# Patient Record
Sex: FEMALE | Race: WHITE | NOT HISPANIC OR LATINO | Employment: FULL TIME | URBAN - METROPOLITAN AREA
[De-identification: names, ages, dates, MRNs, and addresses within clinical notes are randomized per-mention and may not be internally consistent; named-entity substitution may affect disease eponyms.]

---

## 2017-01-04 ENCOUNTER — OFFICE VISIT (OUTPATIENT)
Dept: MEDICAL GROUP | Facility: PHYSICIAN GROUP | Age: 52
End: 2017-01-04
Payer: COMMERCIAL

## 2017-01-04 VITALS
SYSTOLIC BLOOD PRESSURE: 128 MMHG | HEART RATE: 76 BPM | BODY MASS INDEX: 38.27 KG/M2 | RESPIRATION RATE: 18 BRPM | WEIGHT: 216 LBS | TEMPERATURE: 98.1 F | DIASTOLIC BLOOD PRESSURE: 78 MMHG | OXYGEN SATURATION: 96 %

## 2017-01-04 DIAGNOSIS — E66.9 OBESITY (BMI 30-39.9): ICD-10-CM

## 2017-01-04 DIAGNOSIS — Z00.00 PREVENTATIVE HEALTH CARE: ICD-10-CM

## 2017-01-04 DIAGNOSIS — E89.40 PREMATURE SURGICAL MENOPAUSE: ICD-10-CM

## 2017-01-04 DIAGNOSIS — I10 ESSENTIAL HYPERTENSION: ICD-10-CM

## 2017-01-04 DIAGNOSIS — Z12.39 SCREENING FOR BREAST CANCER: ICD-10-CM

## 2017-01-04 PROCEDURE — 99214 OFFICE O/P EST MOD 30 MIN: CPT | Performed by: NURSE PRACTITIONER

## 2017-01-04 RX ORDER — AMLODIPINE BESYLATE 10 MG/1
10 TABLET ORAL DAILY
Qty: 30 TAB | Refills: 1 | Status: SHIPPED | OUTPATIENT
Start: 2017-01-04 | End: 2017-03-29 | Stop reason: SDUPTHER

## 2017-01-04 ASSESSMENT — PATIENT HEALTH QUESTIONNAIRE - PHQ9: CLINICAL INTERPRETATION OF PHQ2 SCORE: 0

## 2017-01-04 NOTE — MR AVS SNAPSHOT
Sharmin Sanchezby   2017 10:15 AM   Office Visit   MRN: [id number]    Department:  [facility]   Dept Phone:  [phone]    Description:  Female : 1965   Provider:  KE Negro           Reason for Visit     Hypertension amlodipine      Allergies as of 2017     Allergen Noted Reactions    Bactrim 2008   Rash    Iodine 10/17/2016   Nausea      You were diagnosed with     Essential hypertension   [6982042]       Premature surgical menopause   [431388]       Obesity (BMI 30-39.9)   [632334]       Screening for breast cancer   [722441]       Preventative health care   [849634]         Vital Signs     Blood Pressure Pulse Temperature Respirations Weight Oxygen Saturation    128/78 mmHg 76 36.7 °C (98.1 °F) 18 97.977 kg (216 lb) 96%    Last Menstrual Period Smoking Status                2008 Current Every Day Smoker          Basic Information     Date Of Birth Sex Race Ethnicity Preferred Language    1965 Female White Non- English      Your appointments     2017  8:30 AM   Adult Draw/Collection with LAB [facility] (--)    [address] [place] [address]. [address]  [place] [address]   [phone]            Mar 01, 2017  1:35 PM   New Patient with KE Negro   [facility]    [address] [place] [address], [address]  [place] [address]   [phone]           Please bring Photo ID, Insurance Cards, All Medication Bottles and copies of any legal documents (such as Living Will, Power of ) If speaking a language besides English please bring an adult . Please arrive 30 minutes prior for check in and registration. You will be receiving a confirmation call a few days before your appointment from our automated call confirmation system.              Problem List              ICD-10-CM Priority Class Noted - Resolved    COPD with exacerbation (HCC) J44.1   2016 - Present    Asthma  exacerbation J45.901 High  12/13/2016 - Present    Nicotine dependence F17.200 Medium  12/13/2016 - Present    Leukocytosis D72.829 Low  12/13/2016 - Present    Essential hypertension I10   1/4/2017 - Present    Premature surgical menopause E89.40   1/4/2017 - Present    Obesity (BMI 30-39.9) E66.9   1/4/2017 - Present      Health Maintenance        Date Due Completion Dates    IMM DTaP/Tdap/Td Vaccine (1 - Tdap) 8/5/1986 ---    IMM PNEUMOCOCCAL 19-64 (ADULT) MEDIUM RISK SERIES (1 of 1 - PPSV23) 8/5/1986 ---    PAP SMEAR 8/5/1988 ---    MAMMOGRAM 8/5/2007 ---    IMM INFLUENZA (1) 9/1/2016 ---            Current Immunizations     No immunizations on file.      Below and/or attached are the medications your provider expects you to take. Review all of your home medications and newly ordered medications with your provider and/or pharmacist. Follow medication instructions as directed by your provider and/or pharmacist. Please keep your medication list with you and share with your provider. Update the information when medications are discontinued, doses are changed, or new medications (including over-the-counter products) are added; and carry medication information at all times in the event of emergency situations     Allergies:  BACTRIM - Rash     IODINE - Nausea               Medications  Valid as of: January 04, 2017 - 11:15 AM    Generic Name Brand Name Tablet Size Instructions for use    Albuterol Sulfate (Aero Soln) albuterol 108 (90 BASE) MCG/ACT Inhale 2 Puffs by mouth every 6 hours as needed for Shortness of Breath.        AmLODIPine Besylate (Tab) NORVASC 10 MG Take 1 Tab by mouth every day.        Budesonide-Formoterol Fumarate (Aerosol) SYMBICORT 160-4.5 MCG/ACT Inhale 2 Puffs by mouth 2 Times a Day.        Ipratropium-Albuterol (Solution) DUONEB 0.5-2.5 (3) MG/3ML 3 mL by Nebulization route every four hours as needed for Shortness of Breath (bronchospasm).        Multiple Vitamin   Take 1 Tab by mouth every  day.        Nicotine (PATCH 24 HR) NICODERM 7 MG/24HR Apply 1 Patch to skin as directed every 24 hours.        .                 Medicines prescribed today were sent to:     [facility] [address] [place] [address] [place] - [address] [place] [address]    Phone: [phone] Fax: [phone]    Open 24 Hours?: No      Medication refill instructions:       If your prescription bottle indicates you have medication refills left, it is not necessary to call your provider’s office. Please contact your pharmacy and they will refill your medication.    If your prescription bottle indicates you do not have any refills left, you may request refills at any time through one of the following ways: The online [employer] system (except Urgent Care), by calling your provider’s office, or by asking your pharmacy to contact your provider’s office with a refill request. Medication refills are processed only during regular business hours and may not be available until the next business day. Your provider may request additional information or to have a follow-up visit with you prior to refilling your medication.   *Please Note: Medication refills are assigned a new Rx number when refilled electronically. Your pharmacy may indicate that no refills were authorized even though a new prescription for the same medication is available at the pharmacy. Please request the medicine by name with the pharmacy before contacting your provider for a refill.        Your To Do List     Future Labs/Procedures Complete By Expires    COMP METABOLIC PANEL  As directed 1/5/2018    Comments:    8 hour fast, plain water only    FREE THYROXINE  As directed 1/5/2018    LIPID PROFILE  As directed 1/5/2018    Comments:    10 hour fast, plain water only    TSH  As directed 1/5/2018    VITAMIN D,25 HYDROXY  As directed 1/5/2018         Xeebel Status: Patient Declined

## 2017-01-04 NOTE — ASSESSMENT & PLAN NOTE
Diagnosed when she was around 22. Recent hospital visit she was discharged on amlodipine 10 mg which she's been taking daily. Home readings done but she does not recall readings.   No adverse effects of medication.

## 2017-01-07 NOTE — PROGRESS NOTES
Subjective:     Chief Complaint   Patient presents with   • Hypertension     amlodipine        Sharmin Cardozo is a 51 y.o. Female New patient here today for evaluation and management of:    Essential hypertension  Diagnosed when she was around 22. Recent hospital visit she was discharged on amlodipine 10 mg which she's been taking daily. Home readings done but she does not recall readings.   No adverse effects of medication.    Premature surgical menopause  2009. No history of HRT use       ROS   Denies HA, chest pain, shortness of breath, abdominal pain, bladder or bowel changes, lower extremity edema.    Current medicines (including changes today)  Current Outpatient Prescriptions   Medication Sig Dispense Refill   • amlodipine (NORVASC) 10 MG Tab Take 1 Tab by mouth every day. 30 Tab 1   • ipratropium-albuterol (DUONEB) 0.5-2.5 (3) MG/3ML nebulizer solution 3 mL by Nebulization route every four hours as needed for Shortness of Breath (bronchospasm). 75 mL 0   • albuterol 108 (90 BASE) MCG/ACT Aero Soln inhalation aerosol Inhale 2 Puffs by mouth every 6 hours as needed for Shortness of Breath. 8.5 g 1   • Multiple Vitamin (DAILY VITAMIN PO) Take 1 Tab by mouth every day.     • budesonide-formoterol (SYMBICORT) 160-4.5 MCG/ACT Aerosol Inhale 2 Puffs by mouth 2 Times a Day. 2 Inhaler 0   • nicotine (NICODERM) 7 MG/24HR PATCH 24 HR Apply 1 Patch to skin as directed every 24 hours. 21 Patch 0     No current facility-administered medications for this visit.       She  has a past medical history of Physiological ovarian cysts; ASTHMA; and Hypertension.    Allergies Bactrim and Iodine    Current medications, problem list, allergies, past medical history, and tobacco use history reviewed in EPIC today.    Health maintenance reviewed and updated.    Objective:   Blood pressure 128/78, pulse 76, temperature 36.7 °C (98.1 °F), resp. rate 18, weight 97.977 kg (216 lb), last menstrual period 04/17/2008, SpO2 96 %. Body  mass index is 38.27 kg/(m^2).     Physical Exam   Constitutional: Alert, no acute distress. Pleasant and cooperative with the examination.  Skin:   Warm, dry, no rashes in visible areas.    Eyes:   Pupils equal, round. Conjunctiva and sclera clear,    Lids normal.  ENT:  Pinna normal.   Neck:   Supple, trachea midline.  Lungs:  Normal effort and respirations. Clear to auscultation bilaterally.  CV:  Regular rate and rhythm.  MS/Ext:  Steady gait, no edema.  Psych:  Eye contact is good, affect calm.    Assessment and Plan:   The following treatment plan was discussed    1. Essential hypertension  Stable. Continue current medicines. Monitor labs regularly.      - TSH; Future  - FREE THYROXINE; Future    2. Premature surgical menopause  Asymptomatic.    3. Obesity (BMI 30-39.9)    - Patient identified as having weight management issue.  Appropriate orders and counseling given.    4. Screening for breast cancer    - MA-SCREEN MAMMO W/CAD-BILAT    5. Preventative health care  I'm's order today and will be reviewed upcoming appointment.  - COMP METABOLIC PANEL; Future  - LIPID PROFILE; Future  - VITAMIN D,25 HYDROXY; Future  - TSH; Future  - FREE THYROXINE; Future    Followup: Return in about 8 weeks (around 3/1/2017), or as previously scheduled.  As scheduled, sooner if symptoms don't resolve or with any new problems.           Reviewed side effects, risks, and benefits of medications prescribed today.  Advised to take all medications as instructed and report any side effects.   The patient voices understanding and agrees.  Report any new or worsening symptoms.  Have labs or other diagnostic studies prior to follow up.  Keep all appointments for any referrals given.      Please note this dictation was created using voice recognition software. Every reasonable attempt has been made to correct obvious errors, however there may be errors of grammar and possibly content that were not discovered before finalizing the note.

## 2017-02-02 ENCOUNTER — RESOLUTE PROFESSIONAL BILLING HOSPITAL PROF FEE (OUTPATIENT)
Dept: HOSPITALIST | Facility: MEDICAL CENTER | Age: 52
End: 2017-02-02
Payer: COMMERCIAL

## 2017-02-02 ENCOUNTER — APPOINTMENT (OUTPATIENT)
Dept: RADIOLOGY | Facility: MEDICAL CENTER | Age: 52
End: 2017-02-02
Attending: EMERGENCY MEDICINE
Payer: COMMERCIAL

## 2017-02-02 ENCOUNTER — HOSPITAL ENCOUNTER (OUTPATIENT)
Facility: MEDICAL CENTER | Age: 52
End: 2017-02-03
Attending: EMERGENCY MEDICINE | Admitting: HOSPITALIST
Payer: COMMERCIAL

## 2017-02-02 DIAGNOSIS — R07.9 CHEST PAIN, UNSPECIFIED TYPE: ICD-10-CM

## 2017-02-02 LAB
ALBUMIN SERPL BCP-MCNC: 4.6 G/DL (ref 3.2–4.9)
ALBUMIN/GLOB SERPL: 1.8 G/DL
ALP SERPL-CCNC: 68 U/L (ref 30–99)
ALT SERPL-CCNC: 18 U/L (ref 2–50)
ANION GAP SERPL CALC-SCNC: 7 MMOL/L (ref 0–11.9)
APTT PPP: 28.3 SEC (ref 24.7–36)
AST SERPL-CCNC: 15 U/L (ref 12–45)
BASOPHILS # BLD AUTO: 0.2 % (ref 0–1.8)
BASOPHILS # BLD: 0.02 K/UL (ref 0–0.12)
BILIRUB SERPL-MCNC: 0.3 MG/DL (ref 0.1–1.5)
BNP SERPL-MCNC: 23 PG/ML (ref 0–100)
BUN SERPL-MCNC: 26 MG/DL (ref 8–22)
CALCIUM SERPL-MCNC: 9.9 MG/DL (ref 8.5–10.5)
CHLORIDE SERPL-SCNC: 105 MMOL/L (ref 96–112)
CO2 SERPL-SCNC: 26 MMOL/L (ref 20–33)
CREAT SERPL-MCNC: 0.59 MG/DL (ref 0.5–1.4)
EKG IMPRESSION: NORMAL
EOSINOPHIL # BLD AUTO: 0.05 K/UL (ref 0–0.51)
EOSINOPHIL NFR BLD: 0.5 % (ref 0–6.9)
ERYTHROCYTE [DISTWIDTH] IN BLOOD BY AUTOMATED COUNT: 42.6 FL (ref 35.9–50)
EST. AVERAGE GLUCOSE BLD GHB EST-MCNC: 117 MG/DL
GFR SERPL CREATININE-BSD FRML MDRD: >60 ML/MIN/1.73 M 2
GLOBULIN SER CALC-MCNC: 2.5 G/DL (ref 1.9–3.5)
GLUCOSE SERPL-MCNC: 110 MG/DL (ref 65–99)
HBA1C MFR BLD: 5.7 % (ref 0–5.6)
HCT VFR BLD AUTO: 43.7 % (ref 37–47)
HGB BLD-MCNC: 14.8 G/DL (ref 12–16)
IMM GRANULOCYTES # BLD AUTO: 0.03 K/UL (ref 0–0.11)
IMM GRANULOCYTES NFR BLD AUTO: 0.3 % (ref 0–0.9)
INR PPP: 0.86 (ref 0.87–1.13)
LIPASE SERPL-CCNC: 9 U/L (ref 11–82)
LYMPHOCYTES # BLD AUTO: 3.34 K/UL (ref 1–4.8)
LYMPHOCYTES NFR BLD: 33.6 % (ref 22–41)
MAGNESIUM SERPL-MCNC: 2.3 MG/DL (ref 1.5–2.5)
MCH RBC QN AUTO: 31.6 PG (ref 27–33)
MCHC RBC AUTO-ENTMCNC: 33.9 G/DL (ref 33.6–35)
MCV RBC AUTO: 93.4 FL (ref 81.4–97.8)
MONOCYTES # BLD AUTO: 0.46 K/UL (ref 0–0.85)
MONOCYTES NFR BLD AUTO: 4.6 % (ref 0–13.4)
NEUTROPHILS # BLD AUTO: 6.05 K/UL (ref 2–7.15)
NEUTROPHILS NFR BLD: 60.8 % (ref 44–72)
NRBC # BLD AUTO: 0 K/UL
NRBC BLD AUTO-RTO: 0 /100 WBC
PLATELET # BLD AUTO: 216 K/UL (ref 164–446)
PMV BLD AUTO: 10 FL (ref 9–12.9)
POTASSIUM SERPL-SCNC: 3.3 MMOL/L (ref 3.6–5.5)
PROT SERPL-MCNC: 7.1 G/DL (ref 6–8.2)
PROTHROMBIN TIME: 12 SEC (ref 12–14.6)
RBC # BLD AUTO: 4.68 M/UL (ref 4.2–5.4)
SODIUM SERPL-SCNC: 138 MMOL/L (ref 135–145)
TROPONIN I SERPL-MCNC: <0.01 NG/ML (ref 0–0.04)
TSH SERPL DL<=0.005 MIU/L-ACNC: 0.57 UIU/ML (ref 0.3–3.7)
WBC # BLD AUTO: 10 K/UL (ref 4.8–10.8)

## 2017-02-02 PROCEDURE — 84484 ASSAY OF TROPONIN QUANT: CPT

## 2017-02-02 PROCEDURE — 84443 ASSAY THYROID STIM HORMONE: CPT

## 2017-02-02 PROCEDURE — 93010 ELECTROCARDIOGRAM REPORT: CPT | Performed by: INTERNAL MEDICINE

## 2017-02-02 PROCEDURE — 99220 PR INITIAL OBSERVATION CARE,LEVL III: CPT | Performed by: HOSPITALIST

## 2017-02-02 PROCEDURE — 99285 EMERGENCY DEPT VISIT HI MDM: CPT

## 2017-02-02 PROCEDURE — 93005 ELECTROCARDIOGRAM TRACING: CPT

## 2017-02-02 PROCEDURE — 93005 ELECTROCARDIOGRAM TRACING: CPT | Performed by: HOSPITALIST

## 2017-02-02 PROCEDURE — G0378 HOSPITAL OBSERVATION PER HR: HCPCS

## 2017-02-02 PROCEDURE — 80053 COMPREHEN METABOLIC PANEL: CPT

## 2017-02-02 PROCEDURE — 83880 ASSAY OF NATRIURETIC PEPTIDE: CPT

## 2017-02-02 PROCEDURE — 83036 HEMOGLOBIN GLYCOSYLATED A1C: CPT

## 2017-02-02 PROCEDURE — 36415 COLL VENOUS BLD VENIPUNCTURE: CPT

## 2017-02-02 PROCEDURE — 96375 TX/PRO/DX INJ NEW DRUG ADDON: CPT

## 2017-02-02 PROCEDURE — 700102 HCHG RX REV CODE 250 W/ 637 OVERRIDE(OP): Performed by: HOSPITALIST

## 2017-02-02 PROCEDURE — 85610 PROTHROMBIN TIME: CPT

## 2017-02-02 PROCEDURE — 71010 DX-CHEST-LIMITED (1 VIEW): CPT

## 2017-02-02 PROCEDURE — 96374 THER/PROPH/DIAG INJ IV PUSH: CPT

## 2017-02-02 PROCEDURE — 85025 COMPLETE CBC W/AUTO DIFF WBC: CPT

## 2017-02-02 PROCEDURE — 93005 ELECTROCARDIOGRAM TRACING: CPT | Performed by: EMERGENCY MEDICINE

## 2017-02-02 PROCEDURE — A9270 NON-COVERED ITEM OR SERVICE: HCPCS | Performed by: HOSPITALIST

## 2017-02-02 PROCEDURE — 85730 THROMBOPLASTIN TIME PARTIAL: CPT

## 2017-02-02 PROCEDURE — 700111 HCHG RX REV CODE 636 W/ 250 OVERRIDE (IP): Performed by: HOSPITALIST

## 2017-02-02 PROCEDURE — 83690 ASSAY OF LIPASE: CPT

## 2017-02-02 PROCEDURE — 700105 HCHG RX REV CODE 258: Performed by: HOSPITALIST

## 2017-02-02 PROCEDURE — 83735 ASSAY OF MAGNESIUM: CPT

## 2017-02-02 RX ORDER — DOCUSATE SODIUM 100 MG/1
100 CAPSULE, LIQUID FILLED ORAL EVERY MORNING
Status: DISCONTINUED | OUTPATIENT
Start: 2017-02-02 | End: 2017-02-03 | Stop reason: HOSPADM

## 2017-02-02 RX ORDER — AMOXICILLIN 250 MG
1 CAPSULE ORAL NIGHTLY
Status: DISCONTINUED | OUTPATIENT
Start: 2017-02-02 | End: 2017-02-03 | Stop reason: HOSPADM

## 2017-02-02 RX ORDER — PROMETHAZINE HYDROCHLORIDE 25 MG/1
12.5-25 SUPPOSITORY RECTAL EVERY 4 HOURS PRN
Status: DISCONTINUED | OUTPATIENT
Start: 2017-02-02 | End: 2017-02-03 | Stop reason: HOSPADM

## 2017-02-02 RX ORDER — ATORVASTATIN CALCIUM 20 MG/1
20 TABLET, FILM COATED ORAL EVERY EVENING
Status: DISCONTINUED | OUTPATIENT
Start: 2017-02-02 | End: 2017-02-03 | Stop reason: HOSPADM

## 2017-02-02 RX ORDER — AMOXICILLIN 250 MG
1 CAPSULE ORAL
Status: DISCONTINUED | OUTPATIENT
Start: 2017-02-02 | End: 2017-02-03 | Stop reason: HOSPADM

## 2017-02-02 RX ORDER — NICOTINE 21 MG/24HR
14 PATCH, TRANSDERMAL 24 HOURS TRANSDERMAL
Status: DISCONTINUED | OUTPATIENT
Start: 2017-02-03 | End: 2017-02-03 | Stop reason: HOSPADM

## 2017-02-02 RX ORDER — NITROGLYCERIN 0.4 MG/1
0.4 TABLET SUBLINGUAL
Status: DISCONTINUED | OUTPATIENT
Start: 2017-02-02 | End: 2017-02-03 | Stop reason: HOSPADM

## 2017-02-02 RX ORDER — ONDANSETRON 2 MG/ML
4 INJECTION INTRAMUSCULAR; INTRAVENOUS EVERY 4 HOURS PRN
Status: DISCONTINUED | OUTPATIENT
Start: 2017-02-02 | End: 2017-02-03 | Stop reason: HOSPADM

## 2017-02-02 RX ORDER — LACTULOSE 20 G/30ML
30 SOLUTION ORAL
Status: DISCONTINUED | OUTPATIENT
Start: 2017-02-02 | End: 2017-02-03 | Stop reason: HOSPADM

## 2017-02-02 RX ORDER — ASPIRIN 81 MG/1
324 TABLET, CHEWABLE ORAL DAILY
Status: DISCONTINUED | OUTPATIENT
Start: 2017-02-02 | End: 2017-02-03 | Stop reason: HOSPADM

## 2017-02-02 RX ORDER — ASPIRIN 300 MG/1
300 SUPPOSITORY RECTAL DAILY
Status: DISCONTINUED | OUTPATIENT
Start: 2017-02-02 | End: 2017-02-03 | Stop reason: HOSPADM

## 2017-02-02 RX ORDER — ENEMA 19; 7 G/133ML; G/133ML
1 ENEMA RECTAL
Status: DISCONTINUED | OUTPATIENT
Start: 2017-02-02 | End: 2017-02-03 | Stop reason: HOSPADM

## 2017-02-02 RX ORDER — MORPHINE SULFATE 4 MG/ML
2-4 INJECTION, SOLUTION INTRAMUSCULAR; INTRAVENOUS
Status: DISCONTINUED | OUTPATIENT
Start: 2017-02-02 | End: 2017-02-03 | Stop reason: HOSPADM

## 2017-02-02 RX ORDER — ALPRAZOLAM 0.25 MG/1
0.25 TABLET ORAL 4 TIMES DAILY PRN
Status: DISCONTINUED | OUTPATIENT
Start: 2017-02-02 | End: 2017-02-03 | Stop reason: HOSPADM

## 2017-02-02 RX ORDER — ASPIRIN 325 MG
325 TABLET ORAL DAILY
Status: DISCONTINUED | OUTPATIENT
Start: 2017-02-02 | End: 2017-02-03 | Stop reason: HOSPADM

## 2017-02-02 RX ORDER — LABETALOL HYDROCHLORIDE 5 MG/ML
10 INJECTION, SOLUTION INTRAVENOUS EVERY 4 HOURS PRN
Status: DISCONTINUED | OUTPATIENT
Start: 2017-02-02 | End: 2017-02-03 | Stop reason: HOSPADM

## 2017-02-02 RX ORDER — BISACODYL 10 MG
10 SUPPOSITORY, RECTAL RECTAL
Status: DISCONTINUED | OUTPATIENT
Start: 2017-02-02 | End: 2017-02-03 | Stop reason: HOSPADM

## 2017-02-02 RX ORDER — ONDANSETRON 4 MG/1
4 TABLET, ORALLY DISINTEGRATING ORAL EVERY 4 HOURS PRN
Status: DISCONTINUED | OUTPATIENT
Start: 2017-02-02 | End: 2017-02-03 | Stop reason: HOSPADM

## 2017-02-02 RX ORDER — AMLODIPINE BESYLATE 10 MG/1
10 TABLET ORAL DAILY
Status: DISCONTINUED | OUTPATIENT
Start: 2017-02-03 | End: 2017-02-03 | Stop reason: HOSPADM

## 2017-02-02 RX ORDER — HEPARIN SODIUM 5000 [USP'U]/ML
5000 INJECTION, SOLUTION INTRAVENOUS; SUBCUTANEOUS EVERY 8 HOURS
Status: DISCONTINUED | OUTPATIENT
Start: 2017-02-02 | End: 2017-02-03 | Stop reason: HOSPADM

## 2017-02-02 RX ORDER — ACETAMINOPHEN 325 MG/1
650 TABLET ORAL EVERY 6 HOURS PRN
Status: DISCONTINUED | OUTPATIENT
Start: 2017-02-02 | End: 2017-02-03 | Stop reason: HOSPADM

## 2017-02-02 RX ORDER — SODIUM CHLORIDE 9 MG/ML
INJECTION, SOLUTION INTRAVENOUS CONTINUOUS
Status: DISPENSED | OUTPATIENT
Start: 2017-02-02 | End: 2017-02-03

## 2017-02-02 RX ORDER — BUDESONIDE AND FORMOTEROL FUMARATE DIHYDRATE 160; 4.5 UG/1; UG/1
2 AEROSOL RESPIRATORY (INHALATION) 2 TIMES DAILY
Status: DISCONTINUED | OUTPATIENT
Start: 2017-02-02 | End: 2017-02-03 | Stop reason: HOSPADM

## 2017-02-02 RX ORDER — PROMETHAZINE HYDROCHLORIDE 25 MG/1
12.5-25 TABLET ORAL EVERY 4 HOURS PRN
Status: DISCONTINUED | OUTPATIENT
Start: 2017-02-02 | End: 2017-02-03 | Stop reason: HOSPADM

## 2017-02-02 RX ADMIN — ASPIRIN 325 MG: 325 TABLET, COATED ORAL at 20:05

## 2017-02-02 RX ADMIN — SODIUM CHLORIDE: 9 INJECTION, SOLUTION INTRAVENOUS at 23:21

## 2017-02-02 RX ADMIN — MORPHINE SULFATE 2 MG: 4 INJECTION INTRAVENOUS at 22:47

## 2017-02-02 RX ADMIN — ATORVASTATIN CALCIUM 20 MG: 20 TABLET, FILM COATED ORAL at 22:47

## 2017-02-02 RX ADMIN — HEPARIN SODIUM 5000 UNITS: 5000 INJECTION, SOLUTION INTRAVENOUS; SUBCUTANEOUS at 22:47

## 2017-02-02 RX ADMIN — ONDANSETRON 4 MG: 2 INJECTION, SOLUTION INTRAMUSCULAR; INTRAVENOUS at 22:47

## 2017-02-02 ASSESSMENT — ENCOUNTER SYMPTOMS
COUGH: 0
VOMITING: 0
DIAPHORESIS: 0
ABDOMINAL PAIN: 0
TINGLING: 1
BACK PAIN: 1
NAUSEA: 1
SHORTNESS OF BREATH: 1

## 2017-02-02 ASSESSMENT — LIFESTYLE VARIABLES
DO YOU DRINK ALCOHOL: NO
ALCOHOL_USE: NO
EVER_SMOKED: YES

## 2017-02-02 ASSESSMENT — PAIN SCALES - GENERAL
PAINLEVEL_OUTOF10: 5
PAINLEVEL_OUTOF10: 0

## 2017-02-02 NOTE — ED NOTES
Pt ambulatory to rm 22 from triage, assessment completed, placed on cardiac monitor, iv established, awaiting md assessment

## 2017-02-02 NOTE — IP AVS SNAPSHOT
2/3/2017          Sharmin Sanchezby  [address]  [place] [address]    Dear Sharmin:    [facility] wants to ensure your discharge home is safe and you or your loved ones have had all your questions answered regarding your care after you leave the hospital.    You may receive a telephone call within two days of your discharge.  This call is to make certain you understand your discharge instructions as well as ensure we provided you with the best care possible during your stay with us.     The call will only last approximately 3-5 minutes and will be done by a nurse.    Once again, we want to ensure your discharge home is safe and that you have a clear understanding of any next steps in your care.  If you have any questions or concerns, please do not hesitate to contact us, we are here for you.  Thank you for choosing [facility] for your healthcare needs.    Sincerely,    Gildardo Pillai    [facility]

## 2017-02-02 NOTE — ED PROVIDER NOTES
ED Provider Note    Scribed for Kurt Pinto M.D. by Bambi Ye. 2/2/2017, 3:43 PM.    Primary care provider: Pcp Pt States None  Means of arrival: Walk-In  History obtained from: Patient  History limited by: None    CHIEF COMPLAINT  Chief Complaint   Patient presents with   • Chest Pain       HPI  Sharmin Cardozo is a 51 y.o. female who presents to the Emergency Department sent by Urgent Care for chest pain with associated shortness of breath and nausea, onset 1:00PM at work after she ate lunch. She states that her left arm and face went numb and were tingling shortly after the chest pain started. She has chronic back pain. She denies any abdominal pain, diaphoresis, vomiting, leg swelling, or coughs. She has a history of asthma and hypertension. She denies any cardiac issues. She admits to smoking on a daily basis but denies any alcohol or drug use. Her father had an MI at the age of 65.     REVIEW OF SYSTEMS  Review of Systems   Constitutional: Negative for diaphoresis.   Respiratory: Positive for shortness of breath. Negative for cough.    Cardiovascular: Positive for chest pain. Negative for leg swelling.   Gastrointestinal: Positive for nausea. Negative for vomiting and abdominal pain.   Musculoskeletal: Positive for back pain (chronic).   Neurological: Positive for tingling.        + numbness   All other systems reviewed and are negative.      PAST MEDICAL HISTORY   has a past medical history of Physiological ovarian cysts; ASTHMA; and Hypertension.    SURGICAL HISTORY   has past surgical history that includes vag deliv only,prev c-sectn; tubal ligation; cystoscopy (3/24/2009); cholecystectomy; vaginal hysterectomy scope total (3/24/2009); and other.    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     Current Outpatient Prescriptions on File Prior to Encounter   Medication Sig Dispense Refill   • amlodipine (NORVASC) 10 MG Tab Take 1 Tab by mouth every day. 30 Tab 1   • ipratropium-albuterol (DUONEB) 0.5-2.5 (3) MG/3ML nebulizer solution 3 mL by Nebulization route every four hours as needed for Shortness of Breath (bronchospasm). 75 mL 0   • budesonide-formoterol (SYMBICORT) 160-4.5 MCG/ACT Aerosol Inhale 2 Puffs by mouth 2 Times a Day. 2 Inhaler 0   • albuterol 108 (90 BASE) MCG/ACT Aero Soln inhalation aerosol Inhale 2 Puffs by mouth every 6 hours as needed for Shortness of Breath. 8.5 g 1   • Multiple Vitamin (DAILY VITAMIN PO) Take 1 Tab by mouth every day.         ALLERGIES  Allergies   Allergen Reactions   • Bactrim Rash   • Iodine Nausea       PHYSICAL EXAM  VITAL SIGNS: /55 mmHg  Pulse 74  Temp(Src) 37.3 °C (99.1 °F)  Resp 16  Ht 1.575 m (5' 2\")  SpO2 98%  LMP 04/17/2008    Constitutional:   No acute distress  HENT:  Moist mucous membranes  Eyes:  No conjunctivitis or icterus  Neck:  trachea is midline, no palpable thyroid  Lymphatic:  No cervical lymphadenopathy  Cardiovascular:  Regular rate and rhythm, no murmurs  Thorax & Lungs:  Normal breath sounds, no rhonchi  Abdomen: bowel sounds present, Soft, Non-tender  Skin:.  no rash  Back:  Non-tender, no CVA tenderness  Extremities:   trace edema  Vascular:  symmetric radial pulse  Neurologic:  Normal gross motor    LABS  Labs Reviewed   COMP METABOLIC PANEL - Abnormal; Notable for the following:     Potassium 3.3 (*)     Glucose 110 (*)     Bun 26 (*)     All other components within normal limits    Narrative:     Indicate which anticoagulants the patient is " on:->UNKNOWN   PROTHROMBIN TIME - Abnormal; Notable for the following:     INR 0.86 (*)     All other components within normal limits    Narrative:     Indicate which anticoagulants the patient is on:->UNKNOWN   LIPASE - Abnormal; Notable for the following:     Lipase 9 (*)     All other components within normal limits    Narrative:     Indicate which anticoagulants the patient is on:->UNKNOWN   TROPONIN    Narrative:     Indicate which anticoagulants the patient is on:->UNKNOWN   BTYPE NATRIURETIC PEPTIDE    Narrative:     Indicate which anticoagulants the patient is on:->UNKNOWN   CBC WITH DIFFERENTIAL    Narrative:     Indicate which anticoagulants the patient is on:->UNKNOWN   APTT    Narrative:     Indicate which anticoagulants the patient is on:->UNKNOWN   ESTIMATED GFR    Narrative:     Indicate which anticoagulants the patient is on:->UNKNOWN   All labs reviewed by me.    EKG Interpretation  Interpreted by me  Normal Sinus Rhythm. Rate 79  Normal corrected QTc  Normal corrected QRS  left Axis  Right atrial abnormality and unchanged from EKG performed yesterday    RADIOLOGY  DX-CHEST-LIMITED (1 VIEW)   Final Result      No acute cardiopulmonary process is identified.      The radiologist's interpretation of all radiological studies have been reviewed by me.    COURSE & MEDICAL DECISION MAKING  Pertinent Labs & Imaging studies reviewed. (See chart for details)    3:43 PM - Patient seen and examined at bedside. Ordered DX chest, troponin, btype natriuretic peptide, CBC with differential, CMP, prothrombin time, APTT, lipase, and EKG to evaluate her symptoms. The differential diagnoses include but are not limited to: Rule out MI. The patient was informed that general lab work and EKG will be ordered.  It was discussed with the patient that she will need to be admitted to the hospital for further evaluation. She understood and verbalized agreement.     5:09 PM I spoke with Dr. Nieto, hospitalist and informed him  about the patient. He will admit.     Medical Decision Making:  The patient's pain presentation and risk factor she'll be admitted for further cardiac chest pain evaluation    DISPOSITION:  Admitted to medicine in guarded condition    FINAL IMPRESSION  1. Chest pain, unspecified type          Bambi DUPREE ([place]), am scribing for, and in the presence of, Kurt Pinto M.D..    Electronically signed by: Bambi Ye ([place]), 2/2/2017    IKurt M.D. personally performed the services described in this documentation, as scribed by Bambi Ye in my presence, and it is both accurate and complete.    The note accurately reflects work and decisions made by me.  Kurt Pinto  2/2/2017  9:58 PM

## 2017-02-02 NOTE — IP AVS SNAPSHOT
" <p align=\"LEFT\"><IMG SRC=\"//EMRWB/blob$/Images/[email or web address]\" alt=\"Image\" WIDTH=\"50%\" HEIGHT=\"200\" BORDER=\"\"></p>                   Name:Sharmin Cardozo  Medical Record Number:[id number]  CSN: [id number]    YOB: 1965   Age: 51 y.o.  Sex: female  HT:1.575 m (5' 2\") WT: 98.6 kg (217 lb 6 oz)          Admit Date: 2/2/2017     Discharge Date:   Today's Date: 2/3/2017  Attending Doctor:  Joel Wright M.D.                  Allergies:  Bactrim and Iodine          Your appointments     Feb 08, 2017  2:45 PM   HOSPITAL FOLLOW UP with Karl Ho M.D.   [facility] (--)    [address]  [place] [id number] [phone]            Feb 22, 2017  8:30 AM   Adult Draw/Collection with LAB [place]   LAB [facility] (--)    [address]  [place] [address]   [phone]            Mar 01, 2017  1:35 PM   New Patient with KE Negro   [facility])    [address], [address]  [place] [address] [phone]           Please bring Photo ID, Insurance Cards, All Medication Bottles and copies of any legal documents (such as Living Will, Power of ) If speaking a language besides English please bring an adult . Please arrive 30 minutes prior for check in and registration. You will be receiving a confirmation call a few days before your appointment from our automated call confirmation system.              Follow-up Information     1. Follow up with KE Negro In 1 week.    Specialty:  Family Medicine    Contact information    [facility]  [place] [address] [phone]           Medication List      Take these Medications        Instructions    albuterol 108 (90 BASE) MCG/ACT Aers inhalation aerosol    Inhale 2 Puffs by mouth every 6 hours as needed for Shortness of Breath.   Dose:  2 Puff       amlodipine 10 MG Tabs   Commonly known " as:  NORVASC    Take 1 Tab by mouth every day.   Dose:  10 mg       budesonide-formoterol 160-4.5 MCG/ACT Aero   Commonly known as:  SYMBICORT    Inhale 2 Puffs by mouth 2 Times a Day.   Dose:  2 Puff       cyclobenzaprine 5 MG tablet   Commonly known as:  FLEXERIL    Take 1-2 Tabs by mouth 3 times a day as needed for Moderate Pain or Muscle Spasms.   Dose:  5-10 mg       DAILY VITAMIN PO    Take 1 Tab by mouth every day.   Dose:  1 Tab       ibuprofen 400 MG Tabs   Commonly known as:  MOTRIN    Take 1 Tab by mouth every 6 hours as needed for Moderate Pain.   Dose:  400 mg       ipratropium-albuterol 0.5-2.5 (3) MG/3ML nebulizer solution   Commonly known as:  DUONEB    3 mL by Nebulization route every four hours as needed for Shortness of Breath (bronchospasm).   Dose:  3 mL

## 2017-02-02 NOTE — IP AVS SNAPSHOT
" After Visit Summary                                                                                                                  Name:Sharmin Cardozo  Medical Record Number:[id number]  CSN: [id number]    YOB: 1965   Age: 51 y.o.  Sex: female  HT:1.575 m (5' 2\") WT: 98.6 kg (217 lb 6 oz)          Admit Date: 2/2/2017     Discharge Date:   Today's Date: 2/3/2017  Attending Doctor:  Joel Wright M.D.                  Allergies:  Bactrim and Iodine            Discharge Instructions       Discharge Instructions    Discharged to home by car with relative. Discharged via wheelchair, hospital escort: Yes.  Special equipment needed: Not Applicable    Be sure to schedule a follow-up appointment with your primary care doctor or any specialists as instructed.     Discharge Plan:   Diet Plan: Discussed  Activity Level: Discussed  Smoking Cessation Offered: Patient Refused  Confirmed Follow up Appointment: Appointment Scheduled  Confirmed Symptoms Management: Discussed  Medication Reconciliation Updated: Yes  Influenza Vaccine Indication: Patient Refuses    I understand that a diet low in cholesterol, fat, and sodium is recommended for good health. Unless I have been given specific instructions below for another diet, I accept this instruction as my diet prescription.   Other diet: Regular    Special Instructions: None    · Is patient discharged on Warfarin / Coumadin?   No     · Is patient Post Blood Transfusion?  No    Depression / Suicide Risk    As you are discharged from this [facility] facility, it is important to learn how to keep safe from harming yourself.    Recognize the warning signs:  · Abrupt changes in personality, positive or negative- including increase in energy   · Giving away possessions  · Change in eating patterns- significant weight changes-  positive or negative  · Change in sleeping patterns- unable to sleep or sleeping all the time   · Unwillingness or inability to " communicate  · Depression  · Unusual sadness, discouragement and loneliness  · Talk of wanting to die  · Neglect of personal appearance   · Rebelliousness- reckless behavior  · Withdrawal from people/activities they love  · Confusion- inability to concentrate     If you or a loved one observes any of these behaviors or has concerns about self-harm, here's what you can do:  · Talk about it- your feelings and reasons for harming yourself  · Remove any means that you might use to hurt yourself (examples: pills, rope, extension cords, firearm)  · Get professional help from the community (Mental Health, Substance Abuse, psychological counseling)  · Do not be alone:Call your Safe Contact- someone whom you trust who will be there for you.  · Call your local CRISIS HOTLINE 727-8928 or 279-553-1167  · Call your local Children's Mobile Crisis Response Team [place] (315) 980-9428 or www.mPortal  · Call the toll free National Suicide Prevention Hotlines   · National Suicide Prevention Lifeline 906-389-SFIZ (4266)  · National Hope Line Network 800-SUICIDE (051-2524)        Your appointments     Feb 08, 2017  2:45 PM   HOSPITAL FOLLOW UP with Karl Ho M.D.   [facility] (--)    [address]  [place] [id number] [phone]            Feb 22, 2017  8:30 AM   Adult Draw/Collection with [facility] [place]   [facility] [place] (--)    [facility]  [place] [address]   [phone]            Mar 01, 2017  1:35 PM   New Patient with KE Negro   [facility] [place]    [address], [address]  [place] [id number] [phone]           Please bring Photo ID, Insurance Cards, All Medication Bottles and copies of any legal documents (such as Living Will, Power of ) If speaking a language besides English please bring an adult . Please arrive 30 minutes prior for check in and  registration. You will be receiving a confirmation call a few days before your appointment from our automated call confirmation system.              Follow-up Information     1. Follow up with KE Negro In 1 week.    Specialty:  Family Medicine    Contact information    [address]  [place] [address] [phone]           Discharge Medication Instructions:    Below are the medications your physician expects you to take upon discharge:    Review all your home medications and newly ordered medications with your doctor and/or pharmacist. Follow medication instructions as directed by your doctor and/or pharmacist.    Please keep your medication list with you and share with your physician.               Medication List      START taking these medications        Instructions    cyclobenzaprine 5 MG tablet   Commonly known as:  FLEXERIL   Next Dose Due:  As needed and prescribed.      Take 1-2 Tabs by mouth 3 times a day as needed for Moderate Pain or Muscle Spasms.   Dose:  5-10 mg       ibuprofen 400 MG Tabs   Commonly known as:  MOTRIN   Next Dose Due:  As needed and prescribed.    Take 1 Tab by mouth every 6 hours as needed for Moderate Pain.   Dose:  400 mg         CONTINUE taking these medications        Instructions    albuterol 108 (90 BASE) MCG/ACT Aers inhalation aerosol   Next Dose Due:  As needed and prescribed.    Inhale 2 Puffs by mouth every 6 hours as needed for Shortness of Breath.   Dose:  2 Puff       amlodipine 10 MG Tabs   Last time this was given:  10 mg on 2/3/2017  8:01 AM   Commonly known as:  NORVASC   Next Dose Due:  Tomorrow    Take 1 Tab by mouth every day.   Dose:  10 mg       budesonide-formoterol 160-4.5 MCG/ACT Aero   Last time this was given:  2 Puffs on 2/3/2017  8:01 AM   Commonly known as:  SYMBICORT   Next Dose Due:  Tomorrow    Inhale 2 Puffs by mouth 2 Times a Day.   Dose:  2 Puff       DAILY VITAMIN PO   Next Dose Due:  Tomorrow    Take 1  Tab by mouth every day.   Dose:  1 Tab       ipratropium-albuterol 0.5-2.5 (3) MG/3ML nebulizer solution   Commonly known as:  DUONEB   Next Dose Due:  As needed and prescribed.    3 mL by Nebulization route every four hours as needed for Shortness of Breath (bronchospasm).   Dose:  3 mL               Instructions           Diet / Nutrition:    Follow any diet instructions given to you by your doctor or the dietician, including how much salt (sodium) you are allowed each day.    If you are overweight, talk to your doctor about a weight reduction plan.    Activity:    Remain physically active following your doctor's instructions about exercise and activity.    Rest often.     Any time you become even a little tired or short of breath, SIT DOWN and rest.    Worsening Symptoms:    Report any of the following signs and symptoms to the doctor's office immediately:    *Pain of jaw, arm, or neck  *Chest pain not relieved by medication                               *Dizziness or loss of consciousness  *Difficulty breathing even when at rest   *More tired than usual                                       *Bleeding drainage or swelling of surgical site  *Swelling of feet, ankles, legs or stomach                 *Fever (>100ºF)  *Pink or blood tinged sputum  *Weight gain (3lbs/day or 5lbs /week)           *Shock from internal defibrillator (if applicable)  *Palpitations or irregular heartbeats                *Cool and/or numb extremities    Stroke Awareness    Common Risk Factors for Stroke include:    Age  Atrial Fibrillation  Carotid Artery Stenosis  Diabetes Mellitus  Excessive alcohol consumption  High blood pressure  Overweight   Physical inactivity  Smoking    Warning signs and symptoms of a stroke include:    *Sudden numbness or weakness of the face, arm or leg (especially on one side of the body).  *Sudden confusion, trouble speaking or understanding.  *Sudden trouble seeing in one or both eyes.  *Sudden trouble  walking, dizziness, loss of balance or coordination.Sudden severe headache with no known cause.    It is very important to get treatment quickly when a stroke occurs. If you experience any of the above warning signs, call 911 immediately.                   Disclaimer         Quit Smoking / Tobacco Use:    I understand the use of any tobacco products increases my chance of suffering from future heart disease or stroke and could cause other illnesses which may shorten my life. Quitting the use of tobacco products is the single most important thing I can do to improve my health. For further information on smoking / tobacco cessation call a Toll Free Quit Line at 1-518.862.3800 (*National Cancer [place]) or 1-841.146.1937 (American Lung Association) or you can access the web based program at www.lungEtaoshi.org.    [place] Tobacco Users Help Line:  (632) 713-9603       Toll Free: 1-502.476.9082  Quit Tobacco Program Critical access hospital Management Services (874)244-5731    Crisis Hotline:    [place] Crisis Hotline:  8-327-NEYXDRJ or 1-375.739.5586    [place] Crisis Hotline:    1-972.928.5387 or 440-110-3954    Discharge Survey:   Thank you for choosing [facility]. We hope we did everything we could to make your hospital stay a pleasant one. You may be receiving a phone survey and we would appreciate your time and participation in answering the questions. Your input is very valuable to us in our efforts to improve our service to our patients and their families.        My signature on this form indicates that:    1. I have reviewed and understand the above information.  2. My questions regarding this information have been answered to my satisfaction.  3. I have formulated a plan with my discharge nurse to obtain my prescribed medications for home.                  Disclaimer         __________________________________                     __________       ________                       Patient Signature                                                  Date                    Time

## 2017-02-02 NOTE — IP AVS SNAPSHOT
Assemblage Access Code: [email or web address]  Expires: 3/4/2017  5:19 PM    Assemblage  A secure, online tool to manage your health information     [employer]’s Assemblage® is a secure, online tool that connects you to your personalized health information from the privacy of your home -- day or night - making it very easy for you to manage your healthcare. Once the activation process is completed, you can even access your medical information using the Assemblage nisa, which is available for free in the Apple Nisa store or Google Play store.     Assemblage provides the following levels of access (as shown below):   My Chart Features   [facility] Primary Care Doctor [facility]  Specialists [facility]  Urgent  Care Non-[facility]  Primary Care  Doctor   Email your healthcare team securely and privately 24/7 X X X X   Manage appointments: schedule your next appointment; view details of past/upcoming appointments X      Request prescription refills. X      View recent personal medical records, including lab and immunizations X X X X   View health record, including health history, allergies, medications X X X X   Read reports about your outpatient visits, procedures, consult and ER notes X X X X   See your discharge summary, which is a recap of your hospital and/or ER visit that includes your diagnosis, lab results, and care plan. X X       How to register for Assemblage:  1. Go to  [email or web address].  2. Click on the Sign Up Now box, which takes you to the New Member Sign Up page. You will need to provide the following information:  a. Enter your Assemblage Access Code exactly as it appears at the top of this page. (You will not need to use this code after you’ve completed the sign-up process. If you do not sign up before the expiration date, you must request a new code.)   b. Enter your date of birth.   c. Enter your home email address.   d. Click Submit, and follow the next screen’s instructions.  3. Create a Assemblage ID. This will be your Project Greent  login ID and cannot be changed, so think of one that is secure and easy to remember.  4. Create a iZoca password. You can change your password at any time.  5. Enter your Password Reset Question and Answer. This can be used at a later time if you forget your password.   6. Enter your e-mail address. This allows you to receive e-mail notifications when new information is available in iZoca.  7. Click Sign Up. You can now view your health information.    For assistance activating your iZoca account, call [phone]

## 2017-02-02 NOTE — ED NOTES
Pt sent by urgent care for eval chest pain radiating down left arm with numbness to arm and face. Pain started at working after picking up a tire. (+) SOB. Pt advised to return to triage nurse for any changes or concerns.

## 2017-02-03 ENCOUNTER — APPOINTMENT (OUTPATIENT)
Dept: RADIOLOGY | Facility: MEDICAL CENTER | Age: 52
End: 2017-02-03
Attending: HOSPITALIST
Payer: COMMERCIAL

## 2017-02-03 VITALS
OXYGEN SATURATION: 96 % | WEIGHT: 217.37 LBS | DIASTOLIC BLOOD PRESSURE: 82 MMHG | HEIGHT: 62 IN | BODY MASS INDEX: 40 KG/M2 | TEMPERATURE: 97 F | RESPIRATION RATE: 68 BRPM | HEART RATE: 88 BPM | SYSTOLIC BLOOD PRESSURE: 130 MMHG

## 2017-02-03 PROBLEM — F17.200 TOBACCO DEPENDENCE: Status: ACTIVE | Noted: 2017-02-03

## 2017-02-03 PROBLEM — J45.909 ASTHMA: Status: ACTIVE | Noted: 2017-02-03

## 2017-02-03 LAB
ANION GAP SERPL CALC-SCNC: 5 MMOL/L (ref 0–11.9)
BUN SERPL-MCNC: 21 MG/DL (ref 8–22)
CALCIUM SERPL-MCNC: 8.9 MG/DL (ref 8.5–10.5)
CHLORIDE SERPL-SCNC: 108 MMOL/L (ref 96–112)
CHOLEST SERPL-MCNC: 148 MG/DL (ref 100–199)
CO2 SERPL-SCNC: 24 MMOL/L (ref 20–33)
CREAT SERPL-MCNC: 0.58 MG/DL (ref 0.5–1.4)
EKG IMPRESSION: NORMAL
EKG IMPRESSION: NORMAL
ERYTHROCYTE [DISTWIDTH] IN BLOOD BY AUTOMATED COUNT: 45.4 FL (ref 35.9–50)
GFR SERPL CREATININE-BSD FRML MDRD: >60 ML/MIN/1.73 M 2
GLUCOSE SERPL-MCNC: 97 MG/DL (ref 65–99)
HCT VFR BLD AUTO: 40.2 % (ref 37–47)
HDLC SERPL-MCNC: 46 MG/DL
HGB BLD-MCNC: 13.6 G/DL (ref 12–16)
LDLC SERPL CALC-MCNC: 80 MG/DL
MCH RBC QN AUTO: 32.2 PG (ref 27–33)
MCHC RBC AUTO-ENTMCNC: 33.8 G/DL (ref 33.6–35)
MCV RBC AUTO: 95.3 FL (ref 81.4–97.8)
PLATELET # BLD AUTO: 194 K/UL (ref 164–446)
PMV BLD AUTO: 9.6 FL (ref 9–12.9)
POTASSIUM SERPL-SCNC: 3.7 MMOL/L (ref 3.6–5.5)
RBC # BLD AUTO: 4.22 M/UL (ref 4.2–5.4)
SODIUM SERPL-SCNC: 137 MMOL/L (ref 135–145)
TRIGL SERPL-MCNC: 108 MG/DL (ref 0–149)
TROPONIN I SERPL-MCNC: <0.01 NG/ML (ref 0–0.04)
TROPONIN I SERPL-MCNC: <0.01 NG/ML (ref 0–0.04)
WBC # BLD AUTO: 8.8 K/UL (ref 4.8–10.8)

## 2017-02-03 PROCEDURE — A9270 NON-COVERED ITEM OR SERVICE: HCPCS | Performed by: HOSPITALIST

## 2017-02-03 PROCEDURE — 80061 LIPID PANEL: CPT

## 2017-02-03 PROCEDURE — 96376 TX/PRO/DX INJ SAME DRUG ADON: CPT

## 2017-02-03 PROCEDURE — A9502 TC99M TETROFOSMIN: HCPCS

## 2017-02-03 PROCEDURE — G0378 HOSPITAL OBSERVATION PER HR: HCPCS

## 2017-02-03 PROCEDURE — 36415 COLL VENOUS BLD VENIPUNCTURE: CPT

## 2017-02-03 PROCEDURE — 80048 BASIC METABOLIC PNL TOTAL CA: CPT

## 2017-02-03 PROCEDURE — 700102 HCHG RX REV CODE 250 W/ 637 OVERRIDE(OP): Performed by: HOSPITALIST

## 2017-02-03 PROCEDURE — 94760 N-INVAS EAR/PLS OXIMETRY 1: CPT

## 2017-02-03 PROCEDURE — 700111 HCHG RX REV CODE 636 W/ 250 OVERRIDE (IP)

## 2017-02-03 PROCEDURE — 93010 ELECTROCARDIOGRAM REPORT: CPT | Performed by: INTERNAL MEDICINE

## 2017-02-03 PROCEDURE — 85027 COMPLETE CBC AUTOMATED: CPT

## 2017-02-03 PROCEDURE — 84484 ASSAY OF TROPONIN QUANT: CPT

## 2017-02-03 PROCEDURE — 99217 PR OBSERVATION CARE DISCHARGE: CPT | Performed by: INTERNAL MEDICINE

## 2017-02-03 PROCEDURE — 93005 ELECTROCARDIOGRAM TRACING: CPT | Performed by: HOSPITALIST

## 2017-02-03 PROCEDURE — 700111 HCHG RX REV CODE 636 W/ 250 OVERRIDE (IP): Performed by: HOSPITALIST

## 2017-02-03 RX ORDER — CYCLOBENZAPRINE HCL 5 MG
5-10 TABLET ORAL 3 TIMES DAILY PRN
Qty: 30 TAB | Refills: 0 | Status: SHIPPED | OUTPATIENT
Start: 2017-02-03 | End: 2017-12-19

## 2017-02-03 RX ORDER — REGADENOSON 0.08 MG/ML
INJECTION, SOLUTION INTRAVENOUS
Status: COMPLETED
Start: 2017-02-03 | End: 2017-02-03

## 2017-02-03 RX ORDER — IBUPROFEN 400 MG/1
400 TABLET ORAL EVERY 6 HOURS PRN
Qty: 30 TAB | Refills: 1 | Status: SHIPPED | OUTPATIENT
Start: 2017-02-03 | End: 2017-03-01

## 2017-02-03 RX ADMIN — AMLODIPINE BESYLATE 10 MG: 10 TABLET ORAL at 08:01

## 2017-02-03 RX ADMIN — MORPHINE SULFATE 2 MG: 4 INJECTION INTRAVENOUS at 07:31

## 2017-02-03 RX ADMIN — HEPARIN SODIUM 5000 UNITS: 5000 INJECTION, SOLUTION INTRAVENOUS; SUBCUTANEOUS at 05:15

## 2017-02-03 RX ADMIN — REGADENOSON 0.4 MG: 0.08 INJECTION, SOLUTION INTRAVENOUS at 15:03

## 2017-02-03 RX ADMIN — BUDESONIDE AND FORMOTEROL FUMARATE DIHYDRATE 2 PUFF: 160; 4.5 AEROSOL RESPIRATORY (INHALATION) at 08:01

## 2017-02-03 RX ADMIN — ASPIRIN 325 MG: 325 TABLET, COATED ORAL at 07:31

## 2017-02-03 ASSESSMENT — LIFESTYLE VARIABLES: EVER_SMOKED: YES

## 2017-02-03 ASSESSMENT — COPD QUESTIONNAIRES
DURING THE PAST 4 WEEKS HOW MUCH DID YOU FEEL SHORT OF BREATH: NONE/LITTLE OF THE TIME
COPD SCREENING SCORE: 4
HAVE YOU SMOKED AT LEAST 100 CIGARETTES IN YOUR ENTIRE LIFE: YES
DO YOU EVER COUGH UP ANY MUCUS OR PHLEGM?: YES, EVERY DAY

## 2017-02-03 ASSESSMENT — PAIN SCALES - GENERAL
PAINLEVEL_OUTOF10: 0
PAINLEVEL_OUTOF10: 5
PAINLEVEL_OUTOF10: 0

## 2017-02-03 NOTE — RESPIRATORY CARE
Respiratory Therapy Update    Attempted to meet with patient several times throughout shift but she was unavailable. Team will attempt again next shift.

## 2017-02-03 NOTE — ED NOTES
Pt given medication as ordered, and a box dinner pt informed that she is to have no caffeine until told otherwise, verbalized understanding, updated on plan of care and on room status

## 2017-02-03 NOTE — PROGRESS NOTES
2 RN skin check complete with Katelynn BAEZ, redness on bilateral ears but blanching from glasses. No other signs of skin breakdown at this time.

## 2017-02-03 NOTE — PROGRESS NOTES
Pt is AOx4, reports pain 5/10, in chest will medicate per MAR, denies SOB. Pt has been updated on POC, all questioned answered. Pt denies further needs at this time. Encouraged pt to call with questions or concerns, call light within reach.

## 2017-02-03 NOTE — H&P
CHIEF COMPLAINT:  Chest pain.    PRIMARY CARE PHYSICIAN:  Dr. Samuels.    ADMITTED TO:  [facility] hospitalist, Dr. Nieto.    DIAGNOSIS:  Chest pain, rule out acute coronary syndrome.    HISTORY OF CURRENT ILLNESS:  The patient is a 51-year-old female started to   have sharp chest pain under her left breast around 1:15 today.  She says her   arm went numb, started having tingling and the pain was 6/10 in intensity,   accompanied by nausea, shortness of breath and she appeared pale in the face.    There was no diaphoresis, no dizziness.  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  Her   initial cardiac enzymes were within normal limits.  Chest x-ray was normal.    Her troponin is less than 0.01.  BNP is 23 and her EKG at this point is normal   with a sinus rhythm of 79 with a TN of 152, QT of 388 with left axis   deviation, without any T-wave abnormalities.  The patient's history; however,   is concerning because of hypertension, as well as tobacco abuse disorder and   thus the patient will be admitted to the clinical decision unit for evaluation   of her chest pain.    PAST MEDICAL HISTORY:  Includes hypertension, tobacco abuse disorder, asthma   and ovarian cyst.    PAST SURGICAL HISTORY:  Includes  x4, tubal ligation, cystoscopy,   cholecystectomy, right knee replacement, vaginal hysterectomy, and left elbow   repair.    ALLERGIES:  SHE IS ALLERGIC TO BACTRIM AND IODINE.    MEDICATIONS:  At the time of admission include albuterol 2 puffs inhaled every   6 hours p.r.n. for shortness of breath, Norvasc 10 mg p.o. daily, Symbicort   160/4.5 two puffs inhaled twice daily, DuoNeb 0.5/2.5 3 mL nebulized every 4   hours p.r.n. for shortness of breath, multivitamin 1 tablet p.o. daily.    SOCIAL  HISTORY:  She is a current everyday smoker.  She has been smoking for   33 years.  She would not delineate exactly how much she has been smoking in at   that time.  Apparently, she is down to 2-3 cigarettes per day.  She does not   drink any alcohol.  She has been completely alcohol free since  when her    went to nursing home for a DUI.  She does not use any recreational drugs.    She has no advanced directive.  She wishes to be full code status.    FAMILY HISTORY:  Mother's history is unknown.  She is not sure if her mother   is alive or .  Father passed away when he was 66.  He had an acute MI   with blood clots in his heart.    REVIEW OF SYSTEMS:  Patient complains of sharp chest pain under her left   breast radiating to the left arm and shoulder, 6/10 intensity, sharp in nature   causing numbness and tingling in the arms, accompanied by nausea, shortness   of breath as well as a pale face.  All other review of systems were reviewed   and are negative.    PHYSICAL EXAMINATION:  VITAL SIGNS:  Temperature 37.3 degrees Celsius, pulse 74, respirations 16,   blood pressure 139/55.  Patient is 95.25 kilograms in weight 157.5 cm tall.  GENERAL:  She is currently alert, awake, oriented x3, pleasant, cooperative   female appears her stated age.  HEENT:  Head is atraumatic.  Eyes follow in normal range of gaze.  Pupils are   equal, round, reactive bilaterally.  Nose midline without discharge.  Ears   bilaterally intact without discharge.  Oral cavity, moist mucous membranes.    No apparent focus infection in the oral cavity.  NECK:  Soft, supple, no JVD, carotid bruit, thyromegaly, or lymphadenopathy   appreciated.  CHEST:  Chest wall moves equal with inspiration and expiration.  No   paradoxical motion.  No reproducible chest wall tenderness.  HEART:  S1, S2.  No murmurs or gallops detected.  LUNGS:  At this point, clear to auscultation.  No rales or rhonchi detected.  ABDOMEN:  Soft.  Bowel sounds present  in all 4 quadrants.  No hepatomegaly, no   splenomegaly, no rebound, no tenderness elicited.  NEUROLOGIC:  Cranial nerves II-XII grossly within normal limits without any   focal deficits appreciated.  SKIN:  Normal turgor.  No rashes or abrasions identified.    LABORATORY EVALUATION:  WBC count is 10, hemoglobin is 14.8, hematocrit 43.7,   platelets 216.  Sodium 138, potassium 3.3, chloride 105, CO2 26, BUN is 26,   creatinine is 0.59, calcium 9.1 with a glucose of 110.  Liver function tests   are within normal limits.  INR 0.8.  Troponin less than 0.01.  Magnesium is   2.3.    Chest x-ray, which I have reviewed myself shows no acute cardiopulmonary   process identified.  A 12-lead EKG, which I have reviewed myself at this point   shows sinus rhythm with a normal SC, QT interval and resolved T-wave changes   which were there in the past and there was a left axis deviation.    IMPRESSION AND PLAN:  At this point:  1.  Most likely atypical chest pain with muscle strain and most likely cause   by musculoskeletal disorder.  The patient, however, does have tobacco abuse   disorder, hypertension and thus, at this point, will be evaluated in the   clinical decision unit.  We will do at this point, serial cardiac markers on   her.  She will have a stress test in the morning.  Her medications will be   optimized.  We will include beta blocker, aspirin, ACE inhibitor and statin   and the patient's stress test results will be evaluated and then a decision   will be made whether to have further intervention on her versus being able to   be discharged home with just musculoskeletal pain.  2.  For her hypertension, continue with strict blood pressure management   including p.r.n. antihypertensives.  3.  For her tobacco abuse disorder, I have counseled her in excess of 10   minutes for smoking cessation.  She will be resumed on her nebulizer   treatments and oxygen and RT protocol.  She will not be allowed to smoke in   the  [facility] and she will get a nicotine patch.  The patient at this point   will be observation admission to the clinical decision unit.       ____________________________________     MD SARAH QUINONEZ / MANOJ    DD:  02/02/2017 21:16:47  DT:  02/03/2017 02:24:21    D#:  [id number]  Job#:  [id number]    cc: Elisabet GALINDO

## 2017-02-04 ENCOUNTER — OFFICE VISIT (OUTPATIENT)
Dept: URGENT CARE | Facility: PHYSICIAN GROUP | Age: 52
End: 2017-02-04
Payer: COMMERCIAL

## 2017-02-04 VITALS
BODY MASS INDEX: 38.27 KG/M2 | TEMPERATURE: 98.8 F | RESPIRATION RATE: 18 BRPM | HEIGHT: 63 IN | HEART RATE: 72 BPM | WEIGHT: 216 LBS | SYSTOLIC BLOOD PRESSURE: 144 MMHG | OXYGEN SATURATION: 96 % | DIASTOLIC BLOOD PRESSURE: 90 MMHG

## 2017-02-04 DIAGNOSIS — R07.89 OTHER CHEST PAIN: ICD-10-CM

## 2017-02-04 PROCEDURE — 99213 OFFICE O/P EST LOW 20 MIN: CPT | Performed by: PHYSICIAN ASSISTANT

## 2017-02-04 NOTE — CARE PLAN
Problem: Discharge Barriers/Planning  Goal: Patient’s continuum of care needs will be met  Intervention: Explain discharge instructions and medication reconcilliation to patient and significant other/support system  Educated patient and family on discharge, discharge instructions, discharge prescription, and discharge follow up appointment. Patient and family members verbalized an understanding of all education.

## 2017-02-04 NOTE — PROGRESS NOTES
[facility]  Address: [address], [place] [address]  Phone: [phone]    Called into pharmacy:  Coreg 6.25mg bid #60  Lipitor 20mg daily #30  Aspirin 325mg daily #30  Motrin 400mg (1tab) p2cvubi prn for moderate pain #30  Flexeril 5mg 1-2 tabs TID prn for moderate pain or muscle spasms #30

## 2017-02-04 NOTE — Clinical Note
February 4, 2017         Patient: Sharmin Cardozo   YOB: 1965   Date of Visit: 2/4/2017           To Whom it May Concern:    Sharmin Cardozo was seen in my clinic on 2/4/2017.  Patient may return to work full duty at this time.     If you have any questions or concerns, please don't hesitate to call.        Sincerely,           Vinita Connors PA-C  Electronically Signed

## 2017-02-04 NOTE — DISCHARGE SUMMARY
HOSPITAL MEDICINE DISCHARGE SUMMARY    Name: Sharmin Cardozo  MRN: [id number]  : 1965  Admit Date: 2017  Discharge Date: 2/3/2017  Attending Provider: Joel Wright M.D.  Primary Care Physician: KE Negro    DISCHARGE DIAGNOSES:     Chest pain, likely musculoskeletal POA: Yes    Asthma POA: Yes    Tobacco dependence POA: Yes    Essential hypertension POA: Yes            SUMMARY OF EVENTS LEADING TO ADMISSION:   51/F with HTN, asthma, tobacco dependence, admitted with CP, after rolling a tire at [facility].     For further details of history of present illness, past medical/social/family histories, allergies and medication, please refer to copy of admission note by Dr. Nieto on 17.     HOSPITAL COURSE:   The patient was admitted to the hospitalist service after being initially evaluated in the ED where troponin was negative. CXR did not show any infiltrates, consolidation, effusion or pneumothorax. EKG did not show any ischemic changes. She was admitted for cardiac rule-out. She was continued on ASA. Serial troponins were done, and it was negative x 3. It was felt that her chest pain was musculoskeletal as she was tender on the left chest and upper arm, but a nuclear myocardial perfusion imaging was done for further cardiac risk stratification, and it showed possible tiny area of reversible ischemia at the inferior apex vs. Artifact. This was discussed with Dr. Ho of cardiology, who recommended that she can be sent home with follow-up with the cardiology clinic, and starting on ASA, beta blocker, and statin. She was started on PRN NSAID and flexeril for her musculoskeletal pain component.    With her clinical improvement, she was deemed ready to be discharged from the hospital as she did not have any further inpatient needs. Patient felt comfortable going home. The discharge plan was discussed with the patient, and she was agreeable to it.     The patient  was subsequently sent home in improved and stable condition.     FOLLOW-UP ISSUES:   1. Musculoskeletal chest pain - continue PRN ibuprofen, and flexeril. Follow-up with PCP in 1-2 weeks.      2. Possible tiny area of reversible ischemia at the inferior apex vs. Artifact she will follow-up with Dr. Ho/cardiology in 1 week, may need a calcium coronary score to clarify the MPI findings. Continue ASA, coreg, and lipitor.     CHANGES IN MANAGEMENT OF CHRONIC CONDITION: As above.     PENDING TESTS: none    FOLLOW-UP TESTS ORDERED POST DISCHARGE: none    DISCHARGE MEDICATIONS:   Medication Sig   ibuprofen (MOTRIN) 400 MG Tab Take 1 Tab by mouth every 6 hours as needed for Moderate Pain.   cyclobenzaprine (FLEXERIL) 5 MG tablet Take 1-2 Tabs by mouth 3 times a day as needed for Moderate Pain or Muscle Spasms.   amlodipine (NORVASC) 10 MG Tab Take 1 Tab by mouth every day.   ipratropium-albuterol (DUONEB) 0.5-2.5 (3) MG/3ML nebulizer solution 3 mL by Nebulization route every four hours as needed for Shortness of Breath (bronchospasm).   budesonide-formoterol (SYMBICORT) 160-4.5 MCG/ACT Aerosol Inhale 2 Puffs by mouth 2 Times a Day.   albuterol 108 (90 BASE) MCG/ACT Aero Soln inhalation aerosol Inhale 2 Puffs by mouth every 6 hours as needed for Shortness of Breath.   Multiple Vitamin (DAILY VITAMIN PO) Take 1 Tab by mouth every day.   Coreg 6.25 mg BID  Lipitor 20mg daily  ASA 325mg daily       FOLLOW-UP APPOINTMENTS:   1. PCP in 1-2 weeks.      For further details on discharge medications, patient education, diet, and activity, please refer to electronic copy of discharge instructions.       TIME SPENT: 45 minutes, with greater than 50% of the time spent on face-to-face encounter, addressing medical issues, coordination of care, counseling, discharge planning, medication reconciliation, and documentation.

## 2017-02-04 NOTE — DISCHARGE INSTRUCTIONS
Discharge Instructions    Discharged to home by car with relative. Discharged via wheelchair, hospital escort: Yes.  Special equipment needed: Not Applicable    Be sure to schedule a follow-up appointment with your primary care doctor or any specialists as instructed.     Discharge Plan:   Diet Plan: Discussed  Activity Level: Discussed  Smoking Cessation Offered: Patient Refused  Confirmed Follow up Appointment: Appointment Scheduled  Confirmed Symptoms Management: Discussed  Medication Reconciliation Updated: Yes  Influenza Vaccine Indication: Patient Refuses    I understand that a diet low in cholesterol, fat, and sodium is recommended for good health. Unless I have been given specific instructions below for another diet, I accept this instruction as my diet prescription.   Other diet: Regular    Special Instructions: None    · Is patient discharged on Warfarin / Coumadin?   No     · Is patient Post Blood Transfusion?  No    Depression / Suicide Risk    As you are discharged from this [facility] facility, it is important to learn how to keep safe from harming yourself.    Recognize the warning signs:  · Abrupt changes in personality, positive or negative- including increase in energy   · Giving away possessions  · Change in eating patterns- significant weight changes-  positive or negative  · Change in sleeping patterns- unable to sleep or sleeping all the time   · Unwillingness or inability to communicate  · Depression  · Unusual sadness, discouragement and loneliness  · Talk of wanting to die  · Neglect of personal appearance   · Rebelliousness- reckless behavior  · Withdrawal from people/activities they love  · Confusion- inability to concentrate     If you or a loved one observes any of these behaviors or has concerns about self-harm, here's what you can do:  · Talk about it- your feelings and reasons for harming yourself  · Remove any means that you might use to hurt yourself (examples: pills, rope,  extension cords, firearm)  · Get professional help from the community (Mental Health, Substance Abuse, psychological counseling)  · Do not be alone:Call your Safe Contact- someone whom you trust who will be there for you.  · Call your local CRISIS HOTLINE 146-7900 or 747-314-7773  · Call your local Children's Mobile Crisis Response Team [place] (791) 892-0139 or www.[employer] English  · Call the toll free National Suicide Prevention Hotlines   · National Suicide Prevention Lifeline 089-215-WVZJ (2861)  · National Hope Line Network 800-SUICIDE (432-8918)

## 2017-02-04 NOTE — PROGRESS NOTES
Chief Complaint   Patient presents with   • Letter for School/Work     was in er on 2/2/2017, needs note that says she is okay to go back to work       HISTORY OF PRESENT ILLNESS: Patient is a 51 y.o. female who presents today for return to work note.  Patient was seen in ED on 02/02/17, she had episode of chest pain, left sided/substernal that lasted about 3-4 mins.  She was at work when it happened. She had hx of GERD but this felt dissimilar.  Reviewing hospital notes she did have a stress test that showed a small abnormality however work up was otherwise negative.  They felt that ultimately the cause of the chest pain episode was musculoskeletal however was instructed to follow up with Cardio which she has not yet done.  No current or further episodes of chest pain.  No SOB.  Her job has never caused or exacerbated her chest pain as far as she knows.     Patient Active Problem List    Diagnosis Date Noted   • Chest pain, likely musculoskeletal 02/02/2017     Priority: High   • Asthma exacerbation 12/13/2016     Priority: High   • Nicotine dependence, cigarettes, uncomplicated 12/13/2016     Priority: Medium   • Asthma 02/03/2017     Priority: Low   • Tobacco dependence 02/03/2017     Priority: Low   • Leukocytosis 12/13/2016     Priority: Low   • Essential hypertension 01/04/2017   • Premature surgical menopause 01/04/2017   • Obesity (BMI 30-39.9) 01/04/2017   • COPD with exacerbation (CMS-[facility]) 12/12/2016       Allergies:Bactrim and Iodine    Current Outpatient Prescriptions Ordered in [facility]   Medication Sig Dispense Refill   • ibuprofen (MOTRIN) 400 MG Tab Take 1 Tab by mouth every 6 hours as needed for Moderate Pain. 30 Tab 1   • cyclobenzaprine (FLEXERIL) 5 MG tablet Take 1-2 Tabs by mouth 3 times a day as needed for Moderate Pain or Muscle Spasms. 30 Tab 0   • amlodipine (NORVASC) 10 MG Tab Take 1 Tab by mouth every day. 30 Tab 1   • ipratropium-albuterol (DUONEB) 0.5-2.5 (3) MG/3ML nebulizer solution 3 mL  "by Nebulization route every four hours as needed for Shortness of Breath (bronchospasm). 75 mL 0   • budesonide-formoterol (SYMBICORT) 160-4.5 MCG/ACT Aerosol Inhale 2 Puffs by mouth 2 Times a Day. 2 Inhaler 0   • albuterol 108 (90 BASE) MCG/ACT Aero Soln inhalation aerosol Inhale 2 Puffs by mouth every 6 hours as needed for Shortness of Breath. 8.5 g 1   • Multiple Vitamin (DAILY VITAMIN PO) Take 1 Tab by mouth every day.       No current [facility]-ordered facility-administered medications on file.       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   HENT: Negative for ear pain, nosebleeds, congestion, sore throat and neck pain.    Eyes: Negative for blurred vision.   Respiratory: Negative for cough, sputum production, shortness of breath and wheezing.    Cardiovascular: SEE HPI  Gastrointestinal: Negative for heartburn, nausea, vomiting and abdominal pain.   All other systems reviewed and are negative.      Exam:  Blood pressure 144/90, pulse 72, temperature 37.1 °C (98.8 °F), resp. rate 18, height 1.6 m (5' 2.99\"), weight 97.977 kg (216 lb), last menstrual period 2008, SpO2 96 %.  General:  Well nourished, " well developed female in NAD  Eyes: PERRLA, EOM within normal limits, no conjunctival injection, no scleral icterus, visual fields and acuity grossly intact.  Ears: Normal shape and symmetry, no tenderness, no discharge. External canals are without any significant edema or erythema. Tympanic membranes are without any inflammation, no effusion. Gross auditory acuity is intact  Nose: Symmetrical, sinuses without tenderness, no discharge.   Mouth: reasonable hygiene, no erythema exudates or tonsillar enlargement.  Neck: no masses, range of motion within normal limits, no tracheal deviation. No lymphadenopathy  Pulmonary: Normal respiratory effort, no wheezes, crackles, or rhonchi.  Cardiovascular: regular rate and rhythm without murmurs, rubs, or gallops.  Abdomen: Soft, nontender, nondistended. Normal bowel sounds. No hepatosplenomegaly or masses, or hernias. No rebound or guarding.  Skin: No visible rashes or lesion. Warm, pink, dry.   Extremities: no clubbing, cyanosis, or edema.  Neuro: A&O x 3. Speech normal/clear.  Normal gait.         Assessment/Plan:  1. Other chest pain         -reviewed hospital notes in detail.    -instructed she must follow up with Cardiology.  I placed her RTW with discussion that she be on lighter duty which she states she can work out with her job, and usually has others do the heavy lifting I would like her to avoid.   -PCP follow up also recommended  -note for work provided.        Supportive care, differential diagnoses, and indications for immediate follow-up discussed with patient.   Pathogenesis of diagnosis discussed including typical length and natural progression.   Instructed to return to clinic or nearest emergency department for any change in condition, further concerns, or worsening of symptoms.  Patient states understanding of the plan of care and discharge instructions.  Instructed to make an appointment, for follow up, with their primary care provider.      Vinita HENDERSON  DAVE Connors

## 2017-02-04 NOTE — MR AVS SNAPSHOT
"        Sharmin Mckay Juliane   2017 11:25 AM   Office Visit   MRN: [id number]    Department:  [facility]   Dept Phone:  [phone]    Description:  Female : 1965   Provider:  Vinita Connors PA-C           Reason for Visit     Letter for School/Work was in er on 2017, needs note that says she is okay to go back to work      Allergies as of 2017     Allergen Noted Reactions    Bactrim 2008   Rash    Iodine 10/17/2016   Nausea      You were diagnosed with     Other chest pain   [786.59.ICD-9-CM]         Vital Signs     Blood Pressure Pulse Temperature Respirations Height Weight    144/90 mmHg 72 37.1 °C (98.8 °F) 18 1.6 m (5' 2.99\") 97.977 kg (216 lb)    Body Mass Index Oxygen Saturation Last Menstrual Period Smoking Status          38.27 kg/m2 96% 2008 Current Every Day Smoker        Basic Information     Date Of Birth Sex Race Ethnicity Preferred Language    1965 Female White Non- English      Your appointments     2017  2:45 PM   HOSPITAL FOLLOW UP with Karl Ho M.D.   [facility] (--)    [address]  [place] [id number] [phone]            2017  8:30 AM   Adult Draw/Collection with [facility]   [facility] [place] (--)    [facility] [facility]  [place] [address]   [phone]            Mar 01, 2017  1:35 PM   New Patient with KE Negro   [facility] [place] [facility]    [address] [place] [address], [address]  [place] [id number] [phone]           Please bring Photo ID, Insurance Cards, All Medication Bottles and copies of any legal documents (such as Living Will, Power of ) If speaking a language besides English please bring an adult . Please arrive 30 minutes prior for check in and registration. You will be receiving a confirmation call a few days before your appointment from our automated call confirmation system.   "           Problem List              ICD-10-CM Priority Class Noted - Resolved    COPD with exacerbation (CMS-[facility]) J44.1   12/12/2016 - Present    Asthma exacerbation J45.901 High  12/13/2016 - Present    Nicotine dependence, cigarettes, uncomplicated F17.210 Medium  12/13/2016 - Present    Leukocytosis D72.829 Low  12/13/2016 - Present    Essential hypertension I10   1/4/2017 - Present    Premature surgical menopause E89.40   1/4/2017 - Present    Obesity (BMI 30-39.9) E66.9   1/4/2017 - Present    Chest pain, likely musculoskeletal R07.9 High  2/2/2017 - Present    Asthma J45.909 Low  2/3/2017 - Present    Tobacco dependence F17.200 Low  2/3/2017 - Present      Health Maintenance        Date Due Completion Dates    IMM DTaP/Tdap/Td Vaccine (1 - Tdap) 8/5/1986 ---    IMM PNEUMOCOCCAL 19-64 (ADULT) MEDIUM RISK SERIES (1 of 1 - PPSV23) 8/5/1986 ---    PAP SMEAR 8/5/1988 ---    MAMMOGRAM 8/5/2007 ---    IMM INFLUENZA (1) 9/1/2016 ---            Current Immunizations     No immunizations on file.      Below and/or attached are the medications your provider expects you to take. Review all of your home medications and newly ordered medications with your provider and/or pharmacist. Follow medication instructions as directed by your provider and/or pharmacist. Please keep your medication list with you and share with your provider. Update the information when medications are discontinued, doses are changed, or new medications (including over-the-counter products) are added; and carry medication information at all times in the event of emergency situations     Allergies:  BACTRIM - Rash     IODINE - Nausea               Medications  Valid as of: February 04, 2017 -  1:34 PM    Generic Name Brand Name Tablet Size Instructions for use    Albuterol Sulfate (Aero Soln) albuterol 108 (90 BASE) MCG/ACT Inhale 2 Puffs by mouth every 6 hours as needed for Shortness of Breath.        AmLODIPine Besylate (Tab) NORVASC 10 MG Take 1 Tab  by mouth every day.        Budesonide-Formoterol Fumarate (Aerosol) SYMBICORT 160-4.5 MCG/ACT Inhale 2 Puffs by mouth 2 Times a Day.        Cyclobenzaprine HCl (Tab) FLEXERIL 5 MG Take 1-2 Tabs by mouth 3 times a day as needed for Moderate Pain or Muscle Spasms.        Ibuprofen (Tab) MOTRIN 400 MG Take 1 Tab by mouth every 6 hours as needed for Moderate Pain.        Ipratropium-Albuterol (Solution) DUONEB 0.5-2.5 (3) MG/3ML 3 mL by Nebulization route every four hours as needed for Shortness of Breath (bronchospasm).        Multiple Vitamin   Take 1 Tab by mouth every day.        .                 Medicines prescribed today were sent to:     [facility] [address] [place] [address] - [address] [place] [address]    Phone: [phone] Fax: [phone]    Open 24 Hours?: No      Medication refill instructions:       If your prescription bottle indicates you have medication refills left, it is not necessary to call your provider’s office. Please contact your pharmacy and they will refill your medication.    If your prescription bottle indicates you do not have any refills left, you may request refills at any time through one of the following ways: The online OneCard system (except Urgent Care), by calling your provider’s office, or by asking your pharmacy to contact your provider’s office with a refill request. Medication refills are processed only during regular business hours and may not be available until the next business day. Your provider may request additional information or to have a follow-up visit with you prior to refilling your medication.   *Please Note: Medication refills are assigned a new Rx number when refilled electronically. Your pharmacy may indicate that no refills were authorized even though a new prescription for the same medication is available at the pharmacy. Please request the medicine by name with the pharmacy before contacting your provider for a refill.             RallyPoint Access Code: [email or web address]  Expires: 3/4/2017  5:19 PM    RallyPoint  A secure, online tool to manage your health information     [employer]’s RallyPoint® is a secure, online tool that connects you to your personalized health information from the privacy of your home -- day or night - making it very easy for you to manage your healthcare. Once the activation process is completed, you can even access your medical information using the RallyPoint nisa, which is available for free in the Apple Nisa store or Google Play store.     RallyPoint provides the following levels of access (as shown below):   My Chart Features   [facility] Primary Care Doctor [facility]  Specialists [facility]  Urgent  Care Non-[facility]  Primary Care  Doctor   Email your healthcare team securely and privately 24/7 X X X    Manage appointments: schedule your next appointment; view details of past/upcoming appointments X      Request prescription refills. X      View recent personal medical records, including lab and immunizations X X X X   View health record, including health history, allergies, medications X X X X   Read reports about your outpatient visits, procedures, consult and ER notes X X X X   See your discharge summary, which is a recap of your hospital and/or ER visit that includes your diagnosis, lab results, and care plan. X X       How to register for RallyPoint:  1. Go to  [email or web address].  2. Click on the Sign Up Now box, which takes you to the New Member Sign Up page. You will need to provide the following information:  a. Enter your RallyPoint Access Code exactly as it appears at the top of this page. (You will not need to use this code after you’ve completed the sign-up process. If you do not sign up before the expiration date, you must request a new code.)   b. Enter your date of birth.   c. Enter your home email address.   d. Click Submit, and follow the next screen’s instructions.  3. Create a RallyPoint ID. This will be your  CloudDock login ID and cannot be changed, so think of one that is secure and easy to remember.  4. Create a CloudDock password. You can change your password at any time.  5. Enter your Password Reset Question and Answer. This can be used at a later time if you forget your password.   6. Enter your e-mail address. This allows you to receive e-mail notifications when new information is available in CloudDock.  7. Click Sign Up. You can now view your health information.    For assistance activating your CloudDock account, call [phone]

## 2017-02-04 NOTE — PROGRESS NOTES
Pt being discharged home, PIV removed, monitor removed, monitor room notified, monitor checked in, vaccines refused, discharge instructions given, no new questions at this time, prescriptions given, all personal belongings are packed and at the bedside, pt awaiting ride home, all safety measures are in place, will continue to monitor pt's status till off the unit.

## 2017-02-08 ENCOUNTER — OFFICE VISIT (OUTPATIENT)
Dept: CARDIOLOGY | Facility: MEDICAL CENTER | Age: 52
End: 2017-02-08
Payer: COMMERCIAL

## 2017-02-08 VITALS
OXYGEN SATURATION: 95 % | HEIGHT: 61 IN | DIASTOLIC BLOOD PRESSURE: 70 MMHG | HEART RATE: 80 BPM | BODY MASS INDEX: 40.4 KG/M2 | WEIGHT: 214 LBS | SYSTOLIC BLOOD PRESSURE: 130 MMHG

## 2017-02-08 DIAGNOSIS — Z82.49 FAMILY HISTORY OF CORONARY ARTERY DISEASE: ICD-10-CM

## 2017-02-08 DIAGNOSIS — E78.5 DYSLIPIDEMIA: ICD-10-CM

## 2017-02-08 DIAGNOSIS — E66.01 OBESITY, MORBID, BMI 40.0-49.9 (HCC): ICD-10-CM

## 2017-02-08 DIAGNOSIS — F17.200 SMOKER: ICD-10-CM

## 2017-02-08 DIAGNOSIS — R07.89 OTHER CHEST PAIN: Primary | ICD-10-CM

## 2017-02-08 PROCEDURE — 99214 OFFICE O/P EST MOD 30 MIN: CPT | Performed by: INTERNAL MEDICINE

## 2017-02-08 RX ORDER — ATORVASTATIN CALCIUM 20 MG/1
20 TABLET, FILM COATED ORAL NIGHTLY
COMMUNITY
End: 2017-02-08

## 2017-02-08 RX ORDER — CARVEDILOL 6.25 MG/1
6.25 TABLET ORAL 2 TIMES DAILY WITH MEALS
COMMUNITY
End: 2018-02-23

## 2017-02-08 RX ORDER — ASPIRIN 325 MG
325 TABLET ORAL DAILY
COMMUNITY
End: 2017-02-08

## 2017-02-08 ASSESSMENT — ENCOUNTER SYMPTOMS
HEARTBURN: 1
SHORTNESS OF BREATH: 1
CARDIOVASCULAR NEGATIVE: 1
WHEEZING: 1

## 2017-02-08 NOTE — PROGRESS NOTES
Name:          Sharmin Cardozo   YOB: 1965  Date:     2/8/2017      Elisabet Samuels, LUCAS.P.R.N.  [address]  [facility] [address]     Karl Ho MD  [address]  [place] [address]  Phone: [phone]  Back Line: [phone]  Fax: [phone]  E-mail: [email or web address]   Dear JOSIE Samuels,    We had the pleasure of seeing your patient, Sharmin Cardozo, in Cardiology Clinic at [facility].    As you know, she is a 51-year-old woman with a history of nicotine dependence, obesity and family history of coronary artery disease who recently presented to the emergency room and was evaluated for an episode of chest pain with an essentially normal nuclear stress test at that time.    I reviewed the images of her recent exercise stress test and discussed her episode of chest discomfort that I think is likely noncardiac in etiology. I discussed with her her blood pressure, lipids, and smoking. I think that she should stay on for now Coreg 6.25 mg by mouth twice a day as she is not having any side effects with that in addition to amlodipine. I did take her off of atorvastatin in light of her lipid panel, which is not that abnormal. I strongly recommended smoking cessation.    We will have the patient follow-up in 6 months.    Thank you for the referral and please do not hesitate to contact me at any time. My contact information is listed above.    This note was dictated using Dragon speech recognition software.     A full note including my physical examination and a full list of rectified medications is available in our medical record, and can be faxed as well.    Karl Ho MD  Cardiologist  [facility]

## 2017-02-08 NOTE — Clinical Note
Name:          Sharmin Cardozo   YOB: 1965  Date:     2/8/2017      Elisabet Samuels, LUCAS.P.R.N.  [address] [facility] [address]  [facility] [address]     Karl Ho MD  [address]  [place] [address]  Phone: [phone]  Back Line: [phone]  Fax: [phone]  E-mail: [email or web address]   Dear JOSIE Samuels,    We had the pleasure of seeing your patient, Sharmin Cardozo, in Cardiology Clinic at [facility] and [facility].    As you know, she is a 51-year-old woman with a history of nicotine dependence, obesity and family history of coronary artery disease who recently presented to the emergency room and was evaluated for an episode of chest pain with an essentially normal nuclear stress test at that time.    I reviewed the images of her recent exercise stress test and discussed her episode of chest discomfort that I think is likely noncardiac in etiology. I discussed with her her blood pressure, lipids, and smoking. I think that she should stay on for now Coreg 6.25 mg by mouth twice a day as she is not having any side effects with that in addition to amlodipine. I did take her off of atorvastatin in light of her lipid panel, which is not that abnormal. I strongly recommended smoking cessation.    We will have the patient follow-up in 6 months.    Thank you for the referral and please do not hesitate to contact me at any time. My contact information is listed above.    This note was dictated using Dragon speech recognition software.     A full note including my physical examination and a full list of rectified medications is available in our medical record, and can be faxed as well.    Karl Ho MD  Cardiologist  [facility]

## 2017-02-08 NOTE — MR AVS SNAPSHOT
"        Sharmin Cardozo   2017 2:45 PM   Office Visit   MRN: [id number]    Department:  [facility]   Dept Phone:  [phone]    Description:  Female : 1965   Provider:  Karl Ho M.D.           Reason for Visit     Hospital Follow-up           Allergies as of 2017     Allergen Noted Reactions    Bactrim 2008   Rash    Iodine 10/17/2016   Nausea      You were diagnosed with     Other chest pain   [786.59.ICD-9-CM]       Smoker   [068513]         Vital Signs     Blood Pressure Pulse Height Weight Body Mass Index Oxygen Saturation    130/70 mmHg 80 1.549 m (5' 1\") 97.07 kg (214 lb) 40.46 kg/m2 95%    Last Menstrual Period Smoking Status                2008 Current Every Day Smoker          Basic Information     Date Of Birth Sex Race Ethnicity Preferred Language    1965 Female White Non- English      Your appointments     2017  8:30 AM   Adult Draw/Collection with LAB [facility] (--)    [address] [place] [address]. [address]  [facility] [address]   [phone]            Mar 01, 2017  1:35 PM   New Patient with KE Negro   [facility])    [address] [place] [address], [address]  [facility] [address] [phone]           Please bring Photo ID, Insurance Cards, All Medication Bottles and copies of any legal documents (such as Living Will, Power of ) If speaking a language besides English please bring an adult . Please arrive 30 minutes prior for check in and registration. You will be receiving a confirmation call a few days before your appointment from our automated call confirmation system.              Problem List              ICD-10-CM Priority Class Noted - Resolved    COPD with exacerbation (CMS-HCC) J44.1   2016 - Present    Asthma exacerbation J45.901 High  2016 - Present    Nicotine dependence, cigarettes, uncomplicated F17.210 Medium  2016 - Present   " Leukocytosis D72.829 Low  12/13/2016 - Present    Essential hypertension I10   1/4/2017 - Present    Premature surgical menopause E89.40   1/4/2017 - Present    Obesity (BMI 30-39.9) E66.9   1/4/2017 - Present    Chest pain, likely musculoskeletal R07.9 High  2/2/2017 - Present    Asthma J45.909 Low  2/3/2017 - Present    Tobacco dependence F17.200 Low  2/3/2017 - Present      Health Maintenance        Date Due Completion Dates    IMM DTaP/Tdap/Td Vaccine (1 - Tdap) 8/5/1986 ---    IMM PNEUMOCOCCAL 19-64 (ADULT) MEDIUM RISK SERIES (1 of 1 - PPSV23) 8/5/1986 ---    PAP SMEAR 8/5/1988 ---    MAMMOGRAM 8/5/2007 ---    IMM INFLUENZA (1) 9/1/2016 ---            Current Immunizations     No immunizations on file.      Below and/or attached are the medications your provider expects you to take. Review all of your home medications and newly ordered medications with your provider and/or pharmacist. Follow medication instructions as directed by your provider and/or pharmacist. Please keep your medication list with you and share with your provider. Update the information when medications are discontinued, doses are changed, or new medications (including over-the-counter products) are added; and carry medication information at all times in the event of emergency situations     Allergies:  BACTRIM - Rash     IODINE - Nausea               Medications  Valid as of: February 08, 2017 -  3:12 PM    Generic Name Brand Name Tablet Size Instructions for use    Albuterol Sulfate (Aero Soln) albuterol 108 (90 BASE) MCG/ACT Inhale 2 Puffs by mouth every 6 hours as needed for Shortness of Breath.        AmLODIPine Besylate (Tab) NORVASC 10 MG Take 1 Tab by mouth every day.        Aspirin (Tablet Delayed Response) ECOTRIN 81 MG Take 81 mg by mouth every day.        Budesonide-Formoterol Fumarate (Aerosol) SYMBICORT 160-4.5 MCG/ACT Inhale 2 Puffs by mouth 2 Times a Day.        Carvedilol (Tab) COREG 6.25 MG Take 6.25 mg by mouth 2 times a  day, with meals.        Cyclobenzaprine HCl (Tab) FLEXERIL 5 MG Take 1-2 Tabs by mouth 3 times a day as needed for Moderate Pain or Muscle Spasms.        Ibuprofen (Tab) MOTRIN 400 MG Take 1 Tab by mouth every 6 hours as needed for Moderate Pain.        Ipratropium-Albuterol (Solution) DUONEB 0.5-2.5 (3) MG/3ML 3 mL by Nebulization route every four hours as needed for Shortness of Breath (bronchospasm).        Multiple Vitamin   Take 1 Tab by mouth every day.        Varenicline Tartrate (Misc) CHANTIX NATHALIE 0.5 MG X 11 & 1 MG X 42 Take as directed        .                 Medicines prescribed today were sent to:     [facility] [address] [place] [address] - [address] [place] [address]    Phone: [phone] Fax: [phone]    Open 24 Hours?: No      Medication refill instructions:       If your prescription bottle indicates you have medication refills left, it is not necessary to call your provider’s office. Please contact your pharmacy and they will refill your medication.    If your prescription bottle indicates you do not have any refills left, you may request refills at any time through one of the following ways: The online Opinion[employer] system (except Urgent Care), by calling your provider’s office, or by asking your pharmacy to contact your provider’s office with a refill request. Medication refills are processed only during regular business hours and may not be available until the next business day. Your provider may request additional information or to have a follow-up visit with you prior to refilling your medication.   *Please Note: Medication refills are assigned a new Rx number when refilled electronically. Your pharmacy may indicate that no refills were authorized even though a new prescription for the same medication is available at the pharmacy. Please request the medicine by name with the pharmacy before contacting your provider for a refill.        Referral     A referral  request has been sent to our patient care coordination department. Please allow 3-5 business days for us to process this request and contact you either by phone or mail. If you do not hear from us by the 5th business day, please call us at [phone].           Jobdoh Access Code: [email or web address]  Expires: 3/4/2017  5:19 PM    Jobdoh  A secure, online tool to manage your health information     [employer]’s Jobdoh® is a secure, online tool that connects you to your personalized health information from the privacy of your home -- day or night - making it very easy for you to manage your healthcare. Once the activation process is completed, you can even access your medical information using the Jobdoh nisa, which is available for free in the Apple Nisa store or Google Play store.     Jobdoh provides the following levels of access (as shown below):   My Chart Features   [facility] Primary Care Doctor [facility]  Specialists [facility]  Urgent  Care Non-[facility]  Primary Care  Doctor   Email your healthcare team securely and privately 24/7 X X X    Manage appointments: schedule your next appointment; view details of past/upcoming appointments X      Request prescription refills. X      View recent personal medical records, including lab and immunizations X X X X   View health record, including health history, allergies, medications X X X X   Read reports about your outpatient visits, procedures, consult and ER notes X X X X   See your discharge summary, which is a recap of your hospital and/or ER visit that includes your diagnosis, lab results, and care plan. X X       How to register for Jobdoh:  1. Go to  [email or web address].  2. Click on the Sign Up Now box, which takes you to the New Member Sign Up page. You will need to provide the following information:  a. Enter your Jobdoh Access Code exactly as it appears at the top of this page. (You will not need to use this code after you’ve completed the sign-up  process. If you do not sign up before the expiration date, you must request a new code.)   b. Enter your date of birth.   c. Enter your home email address.   d. Click Submit, and follow the next screen’s instructions.  3. Create a [email or web address] ID. This will be your [email or web address] login ID and cannot be changed, so think of one that is secure and easy to remember.  4. Create a [email or web address] password. You can change your password at any time.  5. Enter your Password Reset Question and Answer. This can be used at a later time if you forget your password.   6. Enter your e-mail address. This allows you to receive e-mail notifications when new information is available in [employer].  7. Click Sign Up. You can now view your health information.    For assistance activating your [employer] account, call [phone]

## 2017-02-08 NOTE — PROGRESS NOTES
Subjective:   Sharmin Cardozo is a 51  -year-old woman with a history of nicotine dependence, obesity and family history of coronary artery disease who recently presented to the emergency room and was evaluated for an episode of chest pain with an essentially normal nuclear stress test at that time.    She tells me today about the episode of chest pain that brought her into the emergency room recently. It was sharp, left sided, under her left breast and radiating to her left arm. It lasted for 3-4 minutes and occurred while she was at rest. She was told that she appeared pale when it happened. It is different in character than former episodes of reflux which are more midsternal and burning in sensation. She has not had a similar episode before or since, and apart from that cardiovascular review of systems is negative.    She comes in with her , they joke with each other quite a lot throughout the interview.     Past Medical History   Diagnosis Date   • Physiological ovarian cysts    • ASTHMA    • Hypertension      Past Surgical History   Procedure Laterality Date   • Pr vag deliv only,prev c-sectn       x 4   • Tubal ligation     • Cystoscopy  3/24/2009     Performed by ZORAN BRIGGS at SURGERY SAME DAY [facility] ORS   • Cholecystectomy     • Vaginal hysterectomy scope total  3/24/2009     Performed by ZORAN BRIGGS at SURGERY SAME DAY [facility] ORS   • Other       left elbow     Family History   Problem Relation Age of Onset   • Heart Disease Father      History   Smoking status   • Current Every Day Smoker   • Last Attempt to Quit: 12/29/2016   Smokeless tobacco   • Never Used     Comment: Vape with no nicotine      Allergies   Allergen Reactions   • Bactrim Rash   • Iodine Nausea     Outpatient Encounter Prescriptions as of 2/8/2017   Medication Sig Dispense Refill   • carvedilol (COREG) 6.25 MG Tab Take 6.25 mg by mouth 2 times a day, with meals.     • varenicline (CHANTIX STARTING MONTH PAK) 0.5  "MG X 11 & 1 MG X 42 tablet Take as directed 56 Tab 3   • ibuprofen (MOTRIN) 400 MG Tab Take 1 Tab by mouth every 6 hours as needed for Moderate Pain. 30 Tab 1   • cyclobenzaprine (FLEXERIL) 5 MG tablet Take 1-2 Tabs by mouth 3 times a day as needed for Moderate Pain or Muscle Spasms. 30 Tab 0   • amlodipine (NORVASC) 10 MG Tab Take 1 Tab by mouth every day. 30 Tab 1   • ipratropium-albuterol (DUONEB) 0.5-2.5 (3) MG/3ML nebulizer solution 3 mL by Nebulization route every four hours as needed for Shortness of Breath (bronchospasm). 75 mL 0   • budesonide-formoterol (SYMBICORT) 160-4.5 MCG/ACT Aerosol Inhale 2 Puffs by mouth 2 Times a Day. 2 Inhaler 0   • albuterol 108 (90 BASE) MCG/ACT Aero Soln inhalation aerosol Inhale 2 Puffs by mouth every 6 hours as needed for Shortness of Breath. 8.5 g 1   • Multiple Vitamin (DAILY VITAMIN PO) Take 1 Tab by mouth every day.     • aspirin EC (ECOTRIN) 81 MG Tablet Delayed Response Take 81 mg by mouth every day.     • [DISCONTINUED] aspirin (ASA) 325 MG Tab Take 325 mg by mouth every day.     • [DISCONTINUED] atorvastatin (LIPITOR) 20 MG Tab Take 20 mg by mouth every evening.       No facility-administered encounter medications on file as of 2/8/2017.     Review of Systems   Respiratory: Positive for shortness of breath and wheezing.    Cardiovascular: Negative.    Gastrointestinal: Positive for heartburn.   All other systems reviewed and are negative.       Objective:   /70 mmHg  Pulse 80  Ht 1.549 m (5' 1\")  Wt 97.07 kg (214 lb)  BMI 40.46 kg/m2  SpO2 95%  LMP 04/17/2008    Physical Exam   Constitutional: She is oriented to person, place, and time. She appears well-developed and well-nourished. No distress.   Pleasant, obese, middle-aged woman accompanied by her  in no distress   HENT:   Head: Normocephalic and atraumatic.   Eyes: Conjunctivae and EOM are normal. Pupils are equal, round, and reactive to light. No scleral icterus.   Neck: Neck supple. No JVD " "present. No tracheal deviation present.   Cardiovascular: Normal rate, regular rhythm, normal heart sounds and intact distal pulses.  Exam reveals no gallop and no friction rub.    No murmur heard.  Pulses:       Dorsalis pedis pulses are 2+ on the right side, and 2+ on the left side.   No carotid bruits   Pulmonary/Chest: Effort normal. No stridor. No respiratory distress. She has wheezes. She has no rales.   Mild, and expiratory wheezes, no accessory muscle use or respiratory distress. Smells of smoke.   Abdominal: Soft. Bowel sounds are normal. She exhibits no distension.   Musculoskeletal: She exhibits no edema.   Neurological: She is alert and oriented to person, place, and time.   Skin: Skin is warm and dry. No rash noted. She is not diaphoretic. No erythema. No pallor.   Psychiatric: She has a normal mood and affect. Judgment and thought content normal.   Vitals reviewed.       2/3/2017    Cholesterol,Tot 148   Triglycerides 108   HDL 46   LDL 80       Myocardial perfusion imaging, 2/3/2017, images reviewed:  \" Myocardial Perfusion   Report   NUCLEAR IMAGING INTERPRETATION   Normal left ventricular size, ejection fraction, and wall motion.   Possible tiny area of reversible ischemia at the inferior apex vs. artifact\"    Assessment:     1. Other chest pain     2. Smoker  REFERRAL TO TOBACCO CESSATION PROGRAM    varenicline (CHANTIX STARTING MONTH PAK) 0.5 MG X 11 & 1 MG X 42 tablet   3. Dyslipidemia     4. Obesity, morbid, BMI 40.0-49.9 (CMS-HCC)     5. Family history of coronary artery disease         Medical Decision Making:  Today's Assessment / Status / Plan:     Medical Decision Making:    I reviewed the images of her recent exercise stress test and discussed her episode of chest discomfort that I think is likely noncardiac in etiology. I discussed with her her blood pressure, lipids, and smoking. I think that she should stay on for now Coreg 6.25 mg by mouth twice a day as she is not having any side " effects with that in addition to amlodipine. I did take her off of atorvastatin in light of her lipid panel, which is not that abnormal. I strongly recommended smoking cessation.    Karl Ho MD  Cardiologist, [facility] [place]

## 2017-02-10 ENCOUNTER — TELEPHONE (OUTPATIENT)
Dept: CARDIOLOGY | Facility: MEDICAL CENTER | Age: 52
End: 2017-02-10

## 2017-02-10 NOTE — TELEPHONE ENCOUNTER
REFERRAL TO TOBACCO CESSATION PROGRAM         Brenda Sahu       Sent: Thu February 09, 2017  9:01 AM       Message        Patient has been sent to Central Scheduling for Tobacco Cessation Program        If patient is not contacted in a timely manner, please contact [phone]              Thank You      Called patient. She would like the tobacco cessation program phone number mailed to her.    Referral information mailed to patient.    SABA BAEZ

## 2017-02-10 NOTE — Clinical Note
February 10, 2017        Sharmin M[place]ay [place]  [address] [facility] [address] [facility]  [place] [address]        Dear Sharmin:    Your referral for tobacco cessation has been sent to the [facility] Tobacco Cessation Program. The phone number is [phone].     If you are not contacted in a timely manner, please call [phone] to be scheduled. Thank you and have a good day.          Sincerely,              [facility]

## 2017-03-01 ENCOUNTER — OFFICE VISIT (OUTPATIENT)
Dept: MEDICAL GROUP | Facility: PHYSICIAN GROUP | Age: 52
End: 2017-03-01
Payer: COMMERCIAL

## 2017-03-01 VITALS
SYSTOLIC BLOOD PRESSURE: 132 MMHG | BODY MASS INDEX: 38.09 KG/M2 | OXYGEN SATURATION: 95 % | DIASTOLIC BLOOD PRESSURE: 76 MMHG | HEIGHT: 63 IN | TEMPERATURE: 98.8 F | WEIGHT: 215 LBS | HEART RATE: 70 BPM | RESPIRATION RATE: 16 BRPM

## 2017-03-01 DIAGNOSIS — R73.09 ELEVATED HEMOGLOBIN A1C: ICD-10-CM

## 2017-03-01 DIAGNOSIS — G89.29 BILATERAL CHRONIC KNEE PAIN: ICD-10-CM

## 2017-03-01 DIAGNOSIS — F17.210 NICOTINE DEPENDENCE, CIGARETTES, UNCOMPLICATED: ICD-10-CM

## 2017-03-01 DIAGNOSIS — D72.829 LEUKOCYTOSIS, UNSPECIFIED TYPE: ICD-10-CM

## 2017-03-01 DIAGNOSIS — J44.89 COPD WITH ASTHMA: ICD-10-CM

## 2017-03-01 DIAGNOSIS — R07.89 OTHER CHEST PAIN: ICD-10-CM

## 2017-03-01 DIAGNOSIS — M25.561 BILATERAL CHRONIC KNEE PAIN: ICD-10-CM

## 2017-03-01 DIAGNOSIS — I10 ESSENTIAL HYPERTENSION: ICD-10-CM

## 2017-03-01 DIAGNOSIS — Z09 HOSPITAL DISCHARGE FOLLOW-UP: ICD-10-CM

## 2017-03-01 DIAGNOSIS — E89.40 PREMATURE SURGICAL MENOPAUSE: ICD-10-CM

## 2017-03-01 DIAGNOSIS — E78.5 DYSLIPIDEMIA: ICD-10-CM

## 2017-03-01 DIAGNOSIS — M25.562 BILATERAL CHRONIC KNEE PAIN: ICD-10-CM

## 2017-03-01 DIAGNOSIS — Z76.89 ENCOUNTER TO ESTABLISH CARE: ICD-10-CM

## 2017-03-01 PROCEDURE — 99215 OFFICE O/P EST HI 40 MIN: CPT | Performed by: NURSE PRACTITIONER

## 2017-03-01 RX ORDER — ATORVASTATIN CALCIUM 20 MG/1
TABLET, FILM COATED ORAL
COMMUNITY
Start: 2017-02-03 | End: 2018-02-23

## 2017-03-01 NOTE — ASSESSMENT & PLAN NOTE
She started atorvastatin on hospital discharge but this was discontinued at cardiology follow up visit per review of note from visit 2.8.17.

## 2017-03-01 NOTE — ASSESSMENT & PLAN NOTE
Symbicort 1 puff bid, will check label at home. Duoneb twice last month. No recent albuterol use.

## 2017-03-01 NOTE — ASSESSMENT & PLAN NOTE
Intermittent pain and swelling, no redness. One year. Generalized over entire knee. No popping or clicking. Sometimes left gives out. Works on her feet all day 8 hours x 5 days per week at [employer]

## 2017-03-01 NOTE — ASSESSMENT & PLAN NOTE
Diagnosed when she was around 22. Previous hospital visit she was discharged on amlodipine 10 mg which she's been taking daily, recent hospital visit she was started on carvedilol 6.25 mg bid and daily aspirin.  Home readings done but she does not recall specific readings. There has been fluctuation in the readings.

## 2017-03-01 NOTE — MR AVS SNAPSHOT
"Sharmin Cardozo   3/1/2017 1:35 PM   Office Visit   MRN: [id number]    Department:  [facility]   Dept Phone:  [phone]    Description:  Female : 1965   Provider:  KE Negro           Reason for Visit     Establish Care multi chronic problems     Hospital Follow-up           Allergies as of 3/1/2017     Allergen Noted Reactions    Bactrim 2008   Rash    Iodine 10/17/2016   Nausea      You were diagnosed with     Essential hypertension   [9279167]       Dyslipidemia   [483716]       Other chest pain   [786.59.ICD-9-CM]       COPD with asthma (CMS-HCC)   [959022]       Nicotine dependence, cigarettes, uncomplicated   [7016930]       Elevated hemoglobin A1c   [439084]       Bilateral chronic knee pain   [711906]       Leukocytosis, unspecified type   [3087064]   Resolved.    Premature surgical menopause   [486879]       Hospital discharge follow-up   [723669]       Encounter to establish care   [725958]         Vital Signs     Blood Pressure Pulse Temperature Respirations Height Weight    132/76 mmHg 70 37.1 °C (98.8 °F) 16 1.6 m (5' 3\") 97.523 kg (215 lb)    Body Mass Index Oxygen Saturation Last Menstrual Period Smoking Status          38.09 kg/m2 95% 2008 Former Smoker        Basic Information     Date Of Birth Sex Race Ethnicity Preferred Language    1965 Female White Non- English      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 Low  2/3/2017 - Present   " Obesity, morbid, BMI 40.0-49.9 (CMS-[facility]) E66.01   2/8/2017 - Present    Family history of coronary artery disease Z82.49   2/8/2017 - Present    Dyslipidemia E78.5   3/1/2017 - Present    Elevated hemoglobin A1c R73.09   3/1/2017 - Present    Bilateral chronic knee pain M25.561, M25.562, G89.29   3/1/2017 - Present      Health Maintenance        Date Due Completion Dates    IMM DTaP/Tdap/Td Vaccine (1 - Tdap) 8/5/1986 ---    IMM PNEUMOCOCCAL 19-64 (ADULT) MEDIUM RISK SERIES (1 of 1 - PPSV23) 8/5/1986 ---    MAMMOGRAM 8/5/2007 ---    IMM INFLUENZA (1) 9/1/2016 ---            Current Immunizations     No immunizations on file.      Below and/or attached are the medications your provider expects you to take. Review all of your home medications and newly ordered medications with your provider and/or pharmacist. Follow medication instructions as directed by your provider and/or pharmacist. Please keep your medication list with you and share with your provider. Update the information when medications are discontinued, doses are changed, or new medications (including over-the-counter products) are added; and carry medication information at all times in the event of emergency situations     Allergies:  BACTRIM - Rash     IODINE - Nausea               Medications  Valid as of: March 01, 2017 -  2:17 PM    Generic Name Brand Name Tablet Size Instructions for use    Albuterol Sulfate (Aero Soln) albuterol 108 (90 BASE) MCG/ACT Inhale 2 Puffs by mouth every 6 hours as needed for Shortness of Breath.        AmLODIPine Besylate (Tab) NORVASC 10 MG Take 1 Tab by mouth every day.        Aspirin (Tablet Delayed Response) ECOTRIN 81 MG Take 81 mg by mouth every day.        Atorvastatin Calcium (Tab) LIPITOR 20 MG         Budesonide-Formoterol Fumarate (Aerosol) SYMBICORT 160-4.5 MCG/ACT Inhale 2 Puffs by mouth 2 Times a Day.        Carvedilol (Tab) COREG 6.25 MG Take 6.25 mg by mouth 2 times a day, with meals.        Cyclobenzaprine  HCl (Tab) FLEXERIL 5 MG Take 1-2 Tabs by mouth 3 times a day as needed for Moderate Pain or Muscle Spasms.        Ipratropium-Albuterol (Solution) DUONEB 0.5-2.5 (3) MG/3ML 3 mL by Nebulization route every four hours as needed for Shortness of Breath (bronchospasm).        Multiple Vitamin   Take 1 Tab by mouth every day.        Varenicline Tartrate (Misc) CHANTIX ANTHALIE 0.5 MG X 11 & 1 MG X 42 Take as directed        .                 Medicines prescribed today were sent to:     [facility] [address] [place] [address], [place] - [address] [place] [address]    Phone: [phone] Fax: [phone]    Open 24 Hours?: No      Medication refill instructions:       If your prescription bottle indicates you have medication refills left, it is not necessary to call your provider’s office. Please contact your pharmacy and they will refill your medication.    If your prescription bottle indicates you do not have any refills left, you may request refills at any time through one of the following ways: The online [employer] system (except Urgent Care), by calling your provider’s office, or by asking your pharmacy to contact your provider’s office with a refill request. Medication refills are processed only during regular business hours and may not be available until the next business day. Your provider may request additional information or to have a follow-up visit with you prior to refilling your medication.   *Please Note: Medication refills are assigned a new Rx number when refilled electronically. Your pharmacy may indicate that no refills were authorized even though a new prescription for the same medication is available at the pharmacy. Please request the medicine by name with the pharmacy before contacting your provider for a refill.           [employer] Access Code: [email or web address]  Expires: 3/27/2017 10:12 AM    [employer]  A secure, online tool to manage your health information     [employer]’s  [employer]® is a secure, online tool that connects you to your personalized health information from the privacy of your home -- day or night - making it very easy for you to manage your healthcare. Once the activation process is completed, you can even access your medical information using the [employer] nisa, which is available for free in the Apple Nisa store or Google Play store.     [employer] provides the following levels of access (as shown below):   My Chart Features   [facility] Doctor [facility]  Specialists [facility]  Urgent  Care Non-[facility]  Primary Care  Doctor   Email your healthcare team securely and privately 24/7 X X X    Manage appointments: schedule your next appointment; view details of past/upcoming appointments X      Request prescription refills. X      View recent personal medical records, including lab and immunizations X X X X   View health record, including health history, allergies, medications X X X X   Read reports about your outpatient visits, procedures, consult and ER notes X X X X   See your discharge summary, which is a recap of your hospital and/or ER visit that includes your diagnosis, lab results, and care plan. X X       How to register for [employer]:  1. Go to  [email or web address].  2. Click on the Sign Up Now box, which takes you to the New Member Sign Up page. You will need to provide the following information:  a. Enter your [employer] Access Code exactly as it appears at the top of this page. (You will not need to use this code after you’ve completed the sign-up process. If you do not sign up before the expiration date, you must request a new code.)   b. Enter your date of birth.   c. Enter your home email address.   d. Click Submit, and follow the next screen’s instructions.  3. Create a [employer] First ID. This will be your [employer] First login ID and cannot be changed, so think of one that is secure and easy to remember.  4. Create a [employer] password. You can change your password at any  time.  5. Enter your Password Reset Question and Answer. This can be used at a later time if you forget your password.   6. Enter your e-mail address. This allows you to receive e-mail notifications when new information is available in [employer].  7. Click Sign Up. You can now view your health information.    For assistance activating your [employer] account, call [phone]

## 2017-03-01 NOTE — PROGRESS NOTES
Chief Complaint   Patient presents with   • Establish Care     multi chronic problems    • Hospital Follow-up     Sharmin Cardozo is a 51 y.o. female here to establish care and for evaluation and management of the following:  HPI:    Previous PCP Dr. Blair   Essential hypertension  Diagnosed when she was around 22. Previous hospital visit she was discharged on amlodipine 10 mg which she's been taking daily, recent hospital visit she was started on carvedilol 6.25 mg bid and daily aspirin.  Home readings done but she does not recall specific readings. There has been fluctuation in the readings.       Dyslipidemia  She started atorvastatin on hospital discharge but this was discontinued at cardiology follow up visit per review of note from visit 2.8.17.      Chest pain, likely musculoskeletal  Resolved approximately one week after hospital discharge.     COPD with exacerbation (CMS-[facility])  Symbicort 1 puff bid, will check label at home. Duoneb twice last month. No recent albuterol use.      COPD with asthma (CMS-[facility])  Symbicort 1 puff bid, will check label at home. Duoneb twice last month. No recent albuterol use.      Nicotine dependence, cigarettes, uncomplicated  Stopped smoking recently, occasionally uses e-cigarette without nicotine,  still smoking at home.    Premature surgical menopause  2009. No history of HRT use  Ovarian cysts    Bilateral chronic knee pain  Intermittent pain and swelling, no redness. One year. Generalized over entire knee. No popping or clicking. Sometimes left gives out. Works on her feet all day 8 hours x 5 days per week at [employer] department        Current medicines (including changes today)  Current Outpatient Prescriptions   Medication Sig Dispense Refill   • atorvastatin (LIPITOR) 20 MG Tab      • aspirin EC (ECOTRIN) 81 MG Tablet Delayed Response Take 81 mg by mouth every day.     • carvedilol (COREG) 6.25 MG Tab Take 6.25 mg by mouth 2 times a day, with  meals.     • cyclobenzaprine (FLEXERIL) 5 MG tablet Take 1-2 Tabs by mouth 3 times a day as needed for Moderate Pain or Muscle Spasms. 30 Tab 0   • amlodipine (NORVASC) 10 MG Tab Take 1 Tab by mouth every day. 30 Tab 1   • ipratropium-albuterol (DUONEB) 0.5-2.5 (3) MG/3ML nebulizer solution 3 mL by Nebulization route every four hours as needed for Shortness of Breath (bronchospasm). 75 mL 0   • budesonide-formoterol (SYMBICORT) 160-4.5 MCG/ACT Aerosol Inhale 2 Puffs by mouth 2 Times a Day. 2 Inhaler 0   • albuterol 108 (90 BASE) MCG/ACT Aero Soln inhalation aerosol Inhale 2 Puffs by mouth every 6 hours as needed for Shortness of Breath. 8.5 g 1   • varenicline (CHANTIX STARTING MONTH ) 0.5 MG X 11 & 1 MG X 42 tablet Take as directed 56 Tab 3   • Multiple Vitamin (DAILY VITAMIN PO) Take 1 Tab by mouth every day.       No current facility-administered medications for this visit.     She  has a past medical history of Physiological ovarian cysts; ASTHMA; and Hypertension.  She  has past surgical history that includes vag deliv only,prev c-sectn; tubal ligation; cystoscopy (3/24/2009); cholecystectomy; vaginal hysterectomy scope total (3/24/2009); and other.  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 Educated on the importance of health maintenance including cancer screenings and immunizations. Patient declines all immunizations at this time.    ROS  Constitutional: Negative for fever, chills, and unexplained weight loss.   HENT: Negative for ear pain, nosebleeds, and sore throat.  Eyes: Negative for blurred vision.   Respiratory: Negative for cough, sputum production, and wheezing.   Cardiovascular: Negative for chest pain, palpitations.   Gastrointestinal: Negative for nausea, vomiting, abdominal pain, constipation, diarrhea.   Genitourinary: Negative for dysuria, urgency, and frequency.   Musculoskeletal: positive low back and knee pain   Skin: Negative for rash and itching.   Neurological: Negative for dizziness, tremors, focal weakness.   Endo/Heme/Allergies: Does not bruise/bleed easily.   All other systems reviewed and are negative except as in HPI.     Objective:   Blood pressure 132/76, pulse 70, temperature 37.1 °C (98.8 °F), resp. rate 16, height 1.6 m (5' 3\"), weight 97.523 kg (215 lb), last menstrual period 04/17/2008, SpO2 95 %. Body mass index is 38.09 kg/(m^2).  Physical Exam:  Constitutional: Alert, oriented, in no acute distress.  Pleasant and cooperative with the examination.  Psych: Eye contact is good, speech goal directed, affect calm, normal judgement and insight.  Skin: Warm, dry, no rashes in visible areas.  HEENT: PERRL, conjunctiva and sclera clear.  Nares patent with no significant congestion or drainage.  Normal pinnae and external auditory canals. TM intact.  Oral mucous membranes pink and moist. Oropharynx clear.  Neck: Supple, trachea midline.   Lungs: Normal effort and respirations. Clear to auscultation bilaterally.  CV: Regular rate and rhythm.  Abdomen: Soft, non-tender, " no palpable masses. No CVAT  Lower extremities: no erythema, appreciable warmth or swelling on exam.no edema.    Assessment and Plan:   The following treatment plan was discussed    1. Essential hypertension  Stable. Continue current medicines. Monitor labs regularly. Follow-up with specialist as directed.    2. Dyslipidemia  Stop lipitor per cardiology and continue follow up with caridology.     3. Other chest pain  Resolved.    4. COPD with asthma (CMS-HCC)  Stable. She'll reviewed dosing instructions and use inhalers as advised. Recommended she avoid exposure to secondhand smoke.    5. Nicotine dependence, cigarettes, uncomplicated  Counseled regarding ongoing smoking cessation and recommended she avoid e-cigarettes. Also recommended avoidance of secondhand smoke    6. Elevated hemoglobin A1c  Counseled regarding lifestyle modifications including dietary changes and increased regular exercise.    7. Bilateral chronic knee pain  Reviewed imaging today which indicated some mild osteoarthritis of the left knee. Recommended weight loss and additional imaging as indicated. We'll continue to monitor.    8. Leukocytosis, unspecified type  Resolved.    9. Premature surgical menopause  Asymptomatic.    10. Hospital discharge follow-up  Reviewed discharge recommendations and recent cardiology note. She is advised to make significant lifestyle modifications and continue follow-up with cardiology.    11. Encounter to establish care          Records requested. Will be reviewed upon receipt.   Followup: Return in about 3 months (around 6/1/2017) for htn, asthma.  Sooner if needed or with any new problems.       Patient was seen for 40 minutes, more than 50% of time spent in face to face review, consultation, counseling, and arranging future evaluation and follow up of medical conditions and care.     Take all medications as directed.  Report any side effects of medications.   Report any new symptoms.  Follow up as  advised.  Have labs or other studies as ordered done as directed prior to follow up.  Please keep all appointments for any referrals given.    Please note this dictation was created using voice recognition software. Every reasonable attempt has been made to correct obvious errors, however there may be errors of grammar and possibly content that were not discovered before finalizing the note.

## 2017-03-29 RX ORDER — AMLODIPINE BESYLATE 10 MG/1
TABLET ORAL
Qty: 90 TAB | Refills: 1 | Status: SHIPPED | OUTPATIENT
Start: 2017-03-29 | End: 2017-11-28 | Stop reason: SDUPTHER

## 2017-03-29 NOTE — TELEPHONE ENCOUNTER
Was the patient seen in the last year in this department? Yes     Does patient have an active prescription for medications requested? No     Received Request Via: Pharmacy      Pt met protocol?: Yes    LAST OV 03/01/17

## 2017-03-29 NOTE — TELEPHONE ENCOUNTER
Refill X 6 months, sent to pharmacy.Pt. Seen in the last 6 months per protocol.   Lab Results   Component Value Date/Time    SODIUM 137 02/03/2017 03:47 AM    POTASSIUM 3.7 02/03/2017 03:47 AM    CHLORIDE 108 02/03/2017 03:47 AM    CO2 24 02/03/2017 03:47 AM    GLUCOSE 97 02/03/2017 03:47 AM    BUN 21 02/03/2017 03:47 AM    CREATININE 0.58 02/03/2017 03:47 AM

## 2017-04-17 ENCOUNTER — OFFICE VISIT (OUTPATIENT)
Dept: MEDICAL GROUP | Facility: PHYSICIAN GROUP | Age: 52
End: 2017-04-17
Payer: COMMERCIAL

## 2017-04-17 VITALS
HEART RATE: 83 BPM | TEMPERATURE: 98.8 F | WEIGHT: 215 LBS | SYSTOLIC BLOOD PRESSURE: 130 MMHG | BODY MASS INDEX: 40.59 KG/M2 | OXYGEN SATURATION: 95 % | HEIGHT: 61 IN | RESPIRATION RATE: 16 BRPM | DIASTOLIC BLOOD PRESSURE: 82 MMHG

## 2017-04-17 DIAGNOSIS — J02.9 SORE THROAT: ICD-10-CM

## 2017-04-17 DIAGNOSIS — J02.9 PHARYNGITIS, UNSPECIFIED ETIOLOGY: ICD-10-CM

## 2017-04-17 LAB
INT CON NEG: NORMAL
INT CON POS: NORMAL
S PYO AG THROAT QL: NORMAL

## 2017-04-17 PROCEDURE — 87880 STREP A ASSAY W/OPTIC: CPT | Performed by: INTERNAL MEDICINE

## 2017-04-17 PROCEDURE — 99213 OFFICE O/P EST LOW 20 MIN: CPT | Performed by: INTERNAL MEDICINE

## 2017-04-17 RX ORDER — AMOXICILLIN 500 MG/1
875 TABLET, FILM COATED ORAL 2 TIMES DAILY
Qty: 10 TAB | Refills: 0 | Status: CANCELLED | OUTPATIENT
Start: 2017-04-17

## 2017-04-17 NOTE — PATIENT INSTRUCTIONS
pPharyngitis  Pharyngitis is redness, pain, and swelling (inflammation) of your pharynx.   CAUSES   Pharyngitis is usually caused by infection. Most of the time, these infections are from viruses (viral) and are part of a cold. However, sometimes pharyngitis is caused by bacteria (bacterial). Pharyngitis can also be caused by allergies. Viral pharyngitis may be spread from person to person by coughing, sneezing, and personal items or utensils (cups, forks, spoons, toothbrushes). Bacterial pharyngitis may be spread from person to person by more intimate contact, such as kissing.   SIGNS AND SYMPTOMS   Symptoms of pharyngitis include:    · Sore throat.    · Tiredness (fatigue).    · Low-grade fever.    · Headache.  · Joint pain and muscle aches.  · Skin rashes.  · Swollen lymph nodes.  · Plaque-like film on throat or tonsils (often seen with bacterial pharyngitis).  DIAGNOSIS   Your health care provider will ask you questions about your illness and your symptoms. Your medical history, along with a physical exam, is often all that is needed to diagnose pharyngitis. Sometimes, a rapid strep test is done. Other lab tests may also be done, depending on the suspected cause.   TREATMENT   Viral pharyngitis will usually get better in 3-4 days without the use of medicine. Bacterial pharyngitis is treated with medicines that kill germs (antibiotics).   HOME CARE INSTRUCTIONS   · Drink enough water and fluids to keep your urine clear or pale yellow.    · Only take over-the-counter or prescription medicines as directed by your health care provider:    ¨ If you are prescribed antibiotics, make sure you finish them even if you start to feel better.    ¨ Do not take aspirin.    · Get lots of rest.    · Gargle with 8 oz of salt water (½ tsp of salt per 1 qt of water) as often as every 1-2 hours to soothe your throat.    · Throat lozenges (if you are not at risk for choking) or sprays may be used to soothe your throat.  SEEK MEDICAL  CARE IF:   · You have large, tender lumps in your neck.  · You have a rash.  · You cough up green, yellow-brown, or bloody spit.  SEEK IMMEDIATE MEDICAL CARE IF:   · Your neck becomes stiff.  · You drool or are unable to swallow liquids.  · You vomit or are unable to keep medicines or liquids down.  · You have severe pain that does not go away with the use of recommended medicines.  · You have trouble breathing (not caused by a stuffy nose).  MAKE SURE YOU:   · Understand these instructions.  · Will watch your condition.  · Will get help right away if you are not doing well or get worse.     This information is not intended to replace advice given to you by your health care provider. Make sure you discuss any questions you have with your health care provider.     Document Released: 12/18/2006 Document Revised: 10/08/2014 Document Reviewed: 08/25/2014  [employer] Interactive Patient Education ©2016 [employer]

## 2017-04-17 NOTE — Clinical Note
April 17, 2017       Patient: Sharmin Cardozo   YOB: 1965   Date of Visit: 4/17/2017         To Whom It May Concern:    Sharmin Cardozo was seen in our office today for medical illness. She may return to work tomorrow 4/18/2017.    If you have any questions or concerns, please don't hesitate to call [phone]          Sincerely,          Jen Cueva D.O.  Electronically Signed

## 2017-04-17 NOTE — Clinical Note
April 17, 2017       Patient: Sharmin Cardozo   YOB: 1965   Date of Visit: 4/17/2017         To Whom It May Concern:    Sharmin Cardozo was seen in our office today for medical illness.    If you have any questions or concerns, please don't hesitate to call [phone]          Sincerely,          Jen Cueva D.O.  Electronically Signed

## 2017-04-17 NOTE — MR AVS SNAPSHOT
"        Sharmin Mckay Juliane   2017 11:05 AM   Office Visit   MRN: [id number]    Department:  [facility]   Dept Phone:  [phone]    Description:  Female : 1965   Provider:  Jen Cueva D.O.           Reason for Visit     Pharyngitis sore throat & swollen tonsils x1 week      Allergies as of 2017     Allergen Noted Reactions    Bactrim 2008   Rash    Iodine 10/17/2016   Nausea      You were diagnosed with     Sore throat   [367741]       Pharyngitis, unspecified etiology   [2398007]         Vital Signs     Blood Pressure Pulse Temperature Respirations Height Weight    130/82 mmHg 83 37.1 °C (98.8 °F) 16 1.549 m (5' 1\") 97.523 kg (215 lb)    Body Mass Index Oxygen Saturation Last Menstrual Period Breastfeeding? Smoking Status       40.64 kg/m2 95% 2008 No Former Smoker       Basic Information     Date Of Birth Sex Race Ethnicity Preferred Language    1965 Female White Non- English      Your appointments     2017  8:35 AM   Established Patient with KE Negro   [facility]    [address], [address]  [facility] [address] [phone]           You will be receiving a confirmation call a few days before your appointment from our automated call confirmation system.              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 Low  2/3/2017 - Present    Obesity, morbid, BMI 40.0-49.9 (CMS-HCC) E66.01   2017 - " Present    Family history of coronary artery disease Z82.49   2/8/2017 - Present    Dyslipidemia E78.5   3/1/2017 - Present    Elevated hemoglobin A1c R73.09   3/1/2017 - Present    Bilateral chronic knee pain M25.561, M25.562, G89.29   3/1/2017 - Present      Health Maintenance        Date Due Completion Dates    IMM DTaP/Tdap/Td Vaccine (1 - Tdap) 8/5/1986 ---    IMM PNEUMOCOCCAL 19-64 (ADULT) MEDIUM RISK SERIES (1 of 1 - PPSV23) 8/5/1986 ---    MAMMOGRAM 8/5/2007 ---            Current Immunizations     No immunizations on file.      Below and/or attached are the medications your provider expects you to take. Review all of your home medications and newly ordered medications with your provider and/or pharmacist. Follow medication instructions as directed by your provider and/or pharmacist. Please keep your medication list with you and share with your provider. Update the information when medications are discontinued, doses are changed, or new medications (including over-the-counter products) are added; and carry medication information at all times in the event of emergency situations     Allergies:  BACTRIM - Rash     IODINE - Nausea               Medications  Valid as of: April 17, 2017 - 12:09 PM    Generic Name Brand Name Tablet Size Instructions for use    Albuterol Sulfate (Aero Soln) albuterol 108 (90 BASE) MCG/ACT Inhale 2 Puffs by mouth every 6 hours as needed for Shortness of Breath.        AmLODIPine Besylate (Tab) NORVASC 10 MG TAKE ONE TABLET BY MOUTH ONCE DAILY        Aspirin (Tablet Delayed Response) ECOTRIN 81 MG Take 81 mg by mouth every day.        Atorvastatin Calcium (Tab) LIPITOR 20 MG         Budesonide-Formoterol Fumarate (Aerosol) SYMBICORT 160-4.5 MCG/ACT Inhale 2 Puffs by mouth 2 Times a Day.        Carvedilol (Tab) COREG 6.25 MG Take 6.25 mg by mouth 2 times a day, with meals.        Cyclobenzaprine HCl (Tab) FLEXERIL 5 MG Take 1-2 Tabs by mouth 3 times a day as needed for Moderate Pain  or Muscle Spasms.        Ipratropium-Albuterol (Solution) DUONEB 0.5-2.5 (3) MG/3ML 3 mL by Nebulization route every four hours as needed for Shortness of Breath (bronchospasm).        Multiple Vitamin   Take 1 Tab by mouth every day.        Varenicline Tartrate (Misc) CHANTIX NATHALIE 0.5 MG X 11 & 1 MG X 42 Take as directed        .                 Medicines prescribed today were sent to:     [facility] [address] [place] [address] - [address] [place] [address]    Phone: [phone] Fax: [phone]    Open 24 Hours?: No      Medication refill instructions:       If your prescription bottle indicates you have medication refills left, it is not necessary to call your provider’s office. Please contact your pharmacy and they will refill your medication.    If your prescription bottle indicates you do not have any refills left, you may request refills at any time through one of the following ways: The online [employer] system (except Urgent Care), by calling your provider’s office, or by asking your pharmacy to contact your provider’s office with a refill request. Medication refills are processed only during regular business hours and may not be available until the next business day. Your provider may request additional information or to have a follow-up visit with you prior to refilling your medication.   *Please Note: Medication refills are assigned a new Rx number when refilled electronically. Your pharmacy may indicate that no refills were authorized even though a new prescription for the same medication is available at the pharmacy. Please request the medicine by name with the pharmacy before contacting your provider for a refill.        Instructions    pPharyngitis  Pharyngitis is redness, pain, and swelling (inflammation) of your pharynx.   CAUSES   Pharyngitis is usually caused by infection. Most of the time, these infections are from viruses (viral) and are part of a cold. However,  sometimes pharyngitis is caused by bacteria (bacterial). Pharyngitis can also be caused by allergies. Viral pharyngitis may be spread from person to person by coughing, sneezing, and personal items or utensils (cups, forks, spoons, toothbrushes). Bacterial pharyngitis may be spread from person to person by more intimate contact, such as kissing.   SIGNS AND SYMPTOMS   Symptoms of pharyngitis include:    · Sore throat.    · Tiredness (fatigue).    · Low-grade fever.    · Headache.  · Joint pain and muscle aches.  · Skin rashes.  · Swollen lymph nodes.  · Plaque-like film on throat or tonsils (often seen with bacterial pharyngitis).  DIAGNOSIS   Your health care provider will ask you questions about your illness and your symptoms. Your medical history, along with a physical exam, is often all that is needed to diagnose pharyngitis. Sometimes, a rapid strep test is done. Other lab tests may also be done, depending on the suspected cause.   TREATMENT   Viral pharyngitis will usually get better in 3-4 days without the use of medicine. Bacterial pharyngitis is treated with medicines that kill germs (antibiotics).   HOME CARE INSTRUCTIONS   · Drink enough water and fluids to keep your urine clear or pale yellow.    · Only take over-the-counter or prescription medicines as directed by your health care provider:    ¨ If you are prescribed antibiotics, make sure you finish them even if you start to feel better.    ¨ Do not take aspirin.    · Get lots of rest.    · Gargle with 8 oz of salt water (½ tsp of salt per 1 qt of water) as often as every 1-2 hours to soothe your throat.    · Throat lozenges (if you are not at risk for choking) or sprays may be used to soothe your throat.  SEEK MEDICAL CARE IF:   · You have large, tender lumps in your neck.  · You have a rash.  · You cough up green, yellow-brown, or bloody spit.  SEEK IMMEDIATE MEDICAL CARE IF:   · Your neck becomes stiff.  · You drool or are unable to swallow  liquids.  · You vomit or are unable to keep medicines or liquids down.  · You have severe pain that does not go away with the use of recommended medicines.  · You have trouble breathing (not caused by a stuffy nose).  MAKE SURE YOU:   · Understand these instructions.  · Will watch your condition.  · Will get help right away if you are not doing well or get worse.     This information is not intended to replace advice given to you by your health care provider. Make sure you discuss any questions you have with your health care provider.     Document Released: 12/18/2006 Document Revised: 10/08/2014 Document Reviewed: 08/25/2014  [employer] Interactive Patient Education ©2016 [employer]            [employer] Access Code: [email or web address]  Expires: 4/25/2017 11:04 AM    [employer]  A secure, online tool to manage your health information     [employer]® is a secure, online tool that connects you to your personalized health information from the privacy of your home -- day or night - making it very easy for you to manage your healthcare. Once the activation process is completed, you can even access your medical information using the [employer] nisa, which is available for free in the Apple Nisa store or Google Play store.     [employer] provides the following levels of access (as shown below):   My Chart Features   [facility] Doctor [facility]  Specialists [facility]  Urgent  Care Non-[facility]  Primary Care  Doctor   Email your healthcare team securely and privately 24/7 X X X    Manage appointments: schedule your next appointment; view details of past/upcoming appointments X      Request prescription refills. X      View recent personal medical records, including lab and immunizations X X X X   View health record, including health history, allergies, medications X X X X   Read reports about your outpatient visits, procedures, consult and ER notes X X X X   See your discharge summary, which is a recap of your hospital  and/or ER visit that includes your diagnosis, lab results, and care plan. X X       How to register for Potential:  1. Go to  [email or web address].  2. Click on the Sign Up Now box, which takes you to the New Member Sign Up page. You will need to provide the following information:  a. Enter your Potential Access Code exactly as it appears at the top of this page. (You will not need to use this code after you’ve completed the sign-up process. If you do not sign up before the expiration date, you must request a new code.)   b. Enter your date of birth.   c. Enter your home email address.   d. Click Submit, and follow the next screen’s instructions.  3. Create a SP[employer] Navigationt ID. This will be your Potential login ID and cannot be changed, so think of one that is secure and easy to remember.  4. Create a [employer] password. You can change your password at any time.  5. Enter your Password Reset Question and Answer. This can be used at a later time if you forget your password.   6. Enter your e-mail address. This allows you to receive e-mail notifications when new information is available in Potential.  7. Click Sign Up. You can now view your health information.    For assistance activating your Potential account, call [phone]

## 2017-04-17 NOTE — PROGRESS NOTES
Subjective:   Sharmin Cardozo is a 51 y.o. female here today for multiple problems as listed below.    Patient's usual primary care provider is KE Negro and is new to me.     HPI: 7 days of illness including: chills, sore throat, swollen glands, minimal cough productive for thick mucus, ear pain/plugged feeling. Mucus is: thick. Generalized headache.  Symptoms negative for fever, night sweats, facial pain, chest pain, hemoptysis, dyspnea, wheezing, vomiting, diarrhea, ear discharge   Similarly ill exposures: yes, works at [employer]. Has not used OTC medications.  Medical history negative for recurrent OM, tonsillitis, sinusitis, asthma, bronchitis, pneumonia.   She  reports that she quit smoking about 2 months ago. She has never used smokeless tobacco.    Current medicines (including changes today)  Current Outpatient Prescriptions   Medication Sig Dispense Refill   • amlodipine (NORVASC) 10 MG Tab TAKE ONE TABLET BY MOUTH ONCE DAILY 90 Tab 1   • atorvastatin (LIPITOR) 20 MG Tab      • aspirin EC (ECOTRIN) 81 MG Tablet Delayed Response Take 81 mg by mouth every day.     • carvedilol (COREG) 6.25 MG Tab Take 6.25 mg by mouth 2 times a day, with meals.     • cyclobenzaprine (FLEXERIL) 5 MG tablet Take 1-2 Tabs by mouth 3 times a day as needed for Moderate Pain or Muscle Spasms. 30 Tab 0   • ipratropium-albuterol (DUONEB) 0.5-2.5 (3) MG/3ML nebulizer solution 3 mL by Nebulization route every four hours as needed for Shortness of Breath (bronchospasm). 75 mL 0   • budesonide-formoterol (SYMBICORT) 160-4.5 MCG/ACT Aerosol Inhale 2 Puffs by mouth 2 Times a Day. 2 Inhaler 0   • albuterol 108 (90 BASE) MCG/ACT Aero Soln inhalation aerosol Inhale 2 Puffs by mouth every 6 hours as needed for Shortness of Breath. 8.5 g 1   • Multiple Vitamin (DAILY VITAMIN PO) Take 1 Tab by mouth every day.     • varenicline (CHANTIX STARTING MONTH PAK) 0.5 MG X 11 & 1 MG X 42 tablet Take as directed 56 Tab 3     No current  "facility-administered medications for this visit.     She  has a past medical history of Physiological ovarian cysts; ASTHMA; and Hypertension.     ROS  No fever. No productive cough. No skin rashes.  No N/V/D     Objective:   Blood pressure 130/82, pulse 83, temperature 37.1 °C (98.8 °F), resp. rate 16, height 1.549 m (5' 1\"), weight 97.523 kg (215 lb), last menstrual period 04/17/2008, SpO2 95 %, not currently breastfeeding. Body mass index is 40.64 kg/(m^2).   Physical Exam:  Gen: alert, No conversational dyspnea. No audible wheeze, nontoxic.  Eyes: PERRL, conjunctivae slightly injected. No photophobia. No eye discharge.  Ears: normal pinnae. TM: normal  Nose: Nares patent with thick mucus. Sinuses non tender over maxillary / frontal sinuses  Throat: Posterior pharynx with erythematous injection. Minimal tonsillar exudate.   Neck: supple, with tender non-palpable anterior cervical LN.   Lungs:  clear to auscultation, no wheezing, no retraction, no stridor, good air exchange.     Assessment and Plan:   The following treatment plan was discussed   1. Sore throat     2. Pharyngitis, unspecified etiology       Rapid strep negative. Likely viral infection - discussed conservative management.  Treatments advised today in addition to orders above  include: Nasal decongestant, salt water gargle, lozenges, OTC cough/cold product of patient's choice PRN, OTC acetaminophen PRN and fluids and rest.  Followup for worsening symptoms, difficulty breathing, lack of expected recovery, or should new symptoms or problems arise.  Letter for time off work today.     Followup: Return if symptoms worsen or fail to improve, for follow-up with PCP.         Please note that dictation has been dictated using voice recognition soft ware. I have made every reasonable attempt to correct obvious errors, but I expect that there are errors of grammar and possibly content that I did not discover before finalizing the note.  "

## 2017-05-16 ENCOUNTER — HOSPITAL ENCOUNTER (EMERGENCY)
Facility: MEDICAL CENTER | Age: 52
End: 2017-05-16
Attending: EMERGENCY MEDICINE
Payer: COMMERCIAL

## 2017-05-16 ENCOUNTER — OFFICE VISIT (OUTPATIENT)
Dept: URGENT CARE | Facility: PHYSICIAN GROUP | Age: 52
End: 2017-05-16
Payer: COMMERCIAL

## 2017-05-16 ENCOUNTER — APPOINTMENT (OUTPATIENT)
Dept: RADIOLOGY | Facility: MEDICAL CENTER | Age: 52
End: 2017-05-16
Attending: EMERGENCY MEDICINE
Payer: COMMERCIAL

## 2017-05-16 VITALS
RESPIRATION RATE: 20 BRPM | SYSTOLIC BLOOD PRESSURE: 153 MMHG | WEIGHT: 212.08 LBS | HEART RATE: 59 BPM | TEMPERATURE: 97.4 F | HEIGHT: 62 IN | OXYGEN SATURATION: 96 % | DIASTOLIC BLOOD PRESSURE: 119 MMHG | BODY MASS INDEX: 39.03 KG/M2

## 2017-05-16 VITALS
DIASTOLIC BLOOD PRESSURE: 80 MMHG | HEIGHT: 61 IN | OXYGEN SATURATION: 97 % | RESPIRATION RATE: 20 BRPM | WEIGHT: 214 LBS | TEMPERATURE: 98.4 F | SYSTOLIC BLOOD PRESSURE: 124 MMHG | HEART RATE: 80 BPM | BODY MASS INDEX: 40.4 KG/M2

## 2017-05-16 DIAGNOSIS — R10.31 RIGHT LOWER QUADRANT ABDOMINAL PAIN: ICD-10-CM

## 2017-05-16 DIAGNOSIS — R10.11 RIGHT UPPER QUADRANT ABDOMINAL PAIN: ICD-10-CM

## 2017-05-16 DIAGNOSIS — K59.00 CONSTIPATION, UNSPECIFIED CONSTIPATION TYPE: ICD-10-CM

## 2017-05-16 DIAGNOSIS — R10.84 GENERALIZED ABDOMINAL PAIN: ICD-10-CM

## 2017-05-16 DIAGNOSIS — K42.9 UMBILICAL HERNIA WITHOUT OBSTRUCTION AND WITHOUT GANGRENE: ICD-10-CM

## 2017-05-16 LAB
ALBUMIN SERPL BCP-MCNC: 4.2 G/DL (ref 3.2–4.9)
ALBUMIN/GLOB SERPL: 1.4 G/DL
ALP SERPL-CCNC: 69 U/L (ref 30–99)
ALT SERPL-CCNC: 14 U/L (ref 2–50)
ANION GAP SERPL CALC-SCNC: 8 MMOL/L (ref 0–11.9)
APPEARANCE UR: CLEAR
AST SERPL-CCNC: 27 U/L (ref 12–45)
BASOPHILS # BLD AUTO: 0.2 % (ref 0–1.8)
BASOPHILS # BLD: 0.02 K/UL (ref 0–0.12)
BILIRUB SERPL-MCNC: 0.5 MG/DL (ref 0.1–1.5)
BUN SERPL-MCNC: 17 MG/DL (ref 8–22)
CALCIUM SERPL-MCNC: 9.3 MG/DL (ref 8.5–10.5)
CHLORIDE SERPL-SCNC: 106 MMOL/L (ref 96–112)
CO2 SERPL-SCNC: 22 MMOL/L (ref 20–33)
COLOR UR AUTO: YELLOW
CREAT SERPL-MCNC: 0.61 MG/DL (ref 0.5–1.4)
EOSINOPHIL # BLD AUTO: 0.1 K/UL (ref 0–0.51)
EOSINOPHIL NFR BLD: 1 % (ref 0–6.9)
ERYTHROCYTE [DISTWIDTH] IN BLOOD BY AUTOMATED COUNT: 43.8 FL (ref 35.9–50)
GFR SERPL CREATININE-BSD FRML MDRD: >60 ML/MIN/1.73 M 2
GLOBULIN SER CALC-MCNC: 2.9 G/DL (ref 1.9–3.5)
GLUCOSE SERPL-MCNC: 86 MG/DL (ref 65–99)
GLUCOSE UR QL STRIP.AUTO: NEGATIVE MG/DL
HCG UR QL: NEGATIVE
HCT VFR BLD AUTO: 42.8 % (ref 37–47)
HGB BLD-MCNC: 14.3 G/DL (ref 12–16)
IMM GRANULOCYTES # BLD AUTO: 0.03 K/UL (ref 0–0.11)
IMM GRANULOCYTES NFR BLD AUTO: 0.3 % (ref 0–0.9)
KETONES UR QL STRIP.AUTO: ABNORMAL MG/DL
LEUKOCYTE ESTERASE UR QL STRIP.AUTO: NEGATIVE
LIPASE SERPL-CCNC: 6 U/L (ref 11–82)
LYMPHOCYTES # BLD AUTO: 3.99 K/UL (ref 1–4.8)
LYMPHOCYTES NFR BLD: 40.8 % (ref 22–41)
MCH RBC QN AUTO: 31.4 PG (ref 27–33)
MCHC RBC AUTO-ENTMCNC: 33.4 G/DL (ref 33.6–35)
MCV RBC AUTO: 94.1 FL (ref 81.4–97.8)
MONOCYTES # BLD AUTO: 0.48 K/UL (ref 0–0.85)
MONOCYTES NFR BLD AUTO: 4.9 % (ref 0–13.4)
NEUTROPHILS # BLD AUTO: 5.15 K/UL (ref 2–7.15)
NEUTROPHILS NFR BLD: 52.8 % (ref 44–72)
NITRITE UR QL STRIP.AUTO: NEGATIVE
NRBC # BLD AUTO: 0 K/UL
NRBC BLD AUTO-RTO: 0 /100 WBC
PH UR STRIP.AUTO: 6.5 [PH]
PLATELET # BLD AUTO: 217 K/UL (ref 164–446)
PMV BLD AUTO: 10.2 FL (ref 9–12.9)
POTASSIUM SERPL-SCNC: 4.3 MMOL/L (ref 3.6–5.5)
PROT SERPL-MCNC: 7.1 G/DL (ref 6–8.2)
PROT UR QL STRIP: NEGATIVE MG/DL
RBC # BLD AUTO: 4.55 M/UL (ref 4.2–5.4)
RBC UR QL AUTO: ABNORMAL
SODIUM SERPL-SCNC: 136 MMOL/L (ref 135–145)
SP GR UR: 1.02
WBC # BLD AUTO: 9.8 K/UL (ref 4.8–10.8)

## 2017-05-16 PROCEDURE — 81002 URINALYSIS NONAUTO W/O SCOPE: CPT

## 2017-05-16 PROCEDURE — 74177 CT ABD & PELVIS W/CONTRAST: CPT

## 2017-05-16 PROCEDURE — 83690 ASSAY OF LIPASE: CPT

## 2017-05-16 PROCEDURE — 80053 COMPREHEN METABOLIC PANEL: CPT

## 2017-05-16 PROCEDURE — 96374 THER/PROPH/DIAG INJ IV PUSH: CPT

## 2017-05-16 PROCEDURE — 96375 TX/PRO/DX INJ NEW DRUG ADDON: CPT

## 2017-05-16 PROCEDURE — 81025 URINE PREGNANCY TEST: CPT

## 2017-05-16 PROCEDURE — 96376 TX/PRO/DX INJ SAME DRUG ADON: CPT

## 2017-05-16 PROCEDURE — 85025 COMPLETE CBC W/AUTO DIFF WBC: CPT

## 2017-05-16 PROCEDURE — 99285 EMERGENCY DEPT VISIT HI MDM: CPT

## 2017-05-16 PROCEDURE — 99215 OFFICE O/P EST HI 40 MIN: CPT | Performed by: NURSE PRACTITIONER

## 2017-05-16 PROCEDURE — 700111 HCHG RX REV CODE 636 W/ 250 OVERRIDE (IP): Performed by: EMERGENCY MEDICINE

## 2017-05-16 PROCEDURE — 700117 HCHG RX CONTRAST REV CODE 255: Performed by: EMERGENCY MEDICINE

## 2017-05-16 RX ORDER — MORPHINE SULFATE 4 MG/ML
4 INJECTION, SOLUTION INTRAMUSCULAR; INTRAVENOUS ONCE
Status: COMPLETED | OUTPATIENT
Start: 2017-05-16 | End: 2017-05-16

## 2017-05-16 RX ORDER — ONDANSETRON 2 MG/ML
4 INJECTION INTRAMUSCULAR; INTRAVENOUS ONCE
Status: COMPLETED | OUTPATIENT
Start: 2017-05-16 | End: 2017-05-16

## 2017-05-16 RX ORDER — POLYETHYLENE GLYCOL 3350 17 G/17G
17 POWDER, FOR SOLUTION ORAL
Qty: 1 BOTTLE | Refills: 0 | Status: SHIPPED | OUTPATIENT
Start: 2017-05-16 | End: 2017-12-19

## 2017-05-16 RX ORDER — BISACODYL 5 MG/1
10 TABLET, DELAYED RELEASE ORAL DAILY
Qty: 30 TAB | Refills: 0 | Status: SHIPPED | OUTPATIENT
Start: 2017-05-16 | End: 2018-08-14

## 2017-05-16 RX ADMIN — IOHEXOL 100 ML: 350 INJECTION, SOLUTION INTRAVENOUS at 17:43

## 2017-05-16 RX ADMIN — ONDANSETRON 4 MG: 2 INJECTION INTRAMUSCULAR; INTRAVENOUS at 16:34

## 2017-05-16 RX ADMIN — MORPHINE SULFATE 4 MG: 4 INJECTION INTRAVENOUS at 16:34

## 2017-05-16 RX ADMIN — MORPHINE SULFATE 4 MG: 4 INJECTION INTRAVENOUS at 18:04

## 2017-05-16 ASSESSMENT — ENCOUNTER SYMPTOMS
WEAKNESS: 0
NAUSEA: 1
CONSTIPATION: 0
VOMITING: 0
ORTHOPNEA: 0
PALPITATIONS: 0
BACK PAIN: 1
FLANK PAIN: 0
SHORTNESS OF BREATH: 0
DIZZINESS: 0
ABDOMINAL PAIN: 1
SENSORY CHANGE: 0
FALLS: 0
MYALGIAS: 1
CHILLS: 0
TINGLING: 0
DIARRHEA: 0
HEARTBURN: 0
HEADACHES: 0
FEVER: 0

## 2017-05-16 ASSESSMENT — PAIN SCALES - GENERAL: PAINLEVEL_OUTOF10: 6

## 2017-05-16 NOTE — ED PROVIDER NOTES
ED Provider Note    Scribed for Larry Bhatia D.O. by Peng Vega. 5/16/2017  4:06 PM    Primary care provider: KE Negro  Means of arrival: walk in  History obtained from: patient  History limited by: none    CHIEF COMPLAINT  Chief Complaint   Patient presents with   • Abdominal Pain     right sided.        YESSENIA Cardozo is a 51 y.o. female who presents to the Emergency Department as a referral from Urgent Care with right lower quadrant abdominal pain starting 2 days ago. Patient describes her pain as sharp. She localizes it to her right suprapubic region and states that it does not radiate. Movement exacerbates her pain. She has associated nausea. Patient reports a history of hysterectomy. She denies fever, vomiting, chest pain.     REVIEW OF SYSTEMS  Pertinent positives include abdominal pain, nausea. Pertinent negatives include no fever, vomiting, chest pain.  All other systems reviewed and negative.  C.    PAST MEDICAL HISTORY  Past Medical History   Diagnosis Date   • Physiological ovarian cysts    • ASTHMA    • Hypertension        SURGICAL HISTORY  Past Surgical History   Procedure Laterality Date   • Pr vag deliv only,prev c-sectn       x 4   • Tubal ligation     • Cystoscopy  3/24/2009     [facility]   • Cholecystectomy     • Vaginal hysterectomy scope total  3/24/2009     [facility]   • Other       left elbow        SOCIAL HISTORY  Social History   Substance Use Topics   • Smoking status: Former Smoker     Quit date: 02/02/2017   • Smokeless tobacco: Never Used      Comment: Vape with no nicotine    • Alcohol Use: No      Comment: 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    Current outpatient prescriptions:   •  polyethylene glycol 3350 (MIRALAX) Powder, Take 17 g by mouth at bedtime as needed (constipation)., Disp: 1 Bottle, Rfl: 0  •  bisacodyl (DUCODYL) 5 MG EC tablet, Take 2 Tabs by mouth every day., Disp: 30 Tab, Rfl: 0  •  amlodipine (NORVASC) 10 MG Tab, TAKE ONE TABLET BY MOUTH ONCE DAILY, Disp: 90 Tab, Rfl: 1  •  atorvastatin (LIPITOR) 20 MG Tab, , Disp: , Rfl:   •  aspirin EC (ECOTRIN) 81 MG Tablet Delayed Response, Take 81 mg by mouth every day., Disp: , Rfl:   •  carvedilol (COREG) 6.25 MG Tab, Take 6.25 mg by mouth 2 times a day, with meals., Disp: , Rfl:   •  varenicline (CHANTIX STARTING MONTH PAK) 0.5 MG X 11 & 1 MG X 42 tablet, Take as directed, Disp: 56 Tab, Rfl: 3  •  cyclobenzaprine (FLEXERIL) 5 MG tablet, Take 1-2 Tabs by mouth 3 times a day as needed for Moderate Pain or Muscle Spasms., Disp: 30 Tab, Rfl: 0  •  ipratropium-albuterol (DUONEB) 0.5-2.5 (3) MG/3ML nebulizer solution, 3 mL by Nebulization route every four hours as needed for Shortness of Breath (bronchospasm)., Disp: 75 mL, Rfl: 0  •  budesonide-formoterol (SYMBICORT) 160-4.5 MCG/ACT Aerosol, Inhale 2 Puffs by mouth 2 Times a Day., Disp: 2 Inhaler, Rfl: 0  •  albuterol 108 (90 BASE) MCG/ACT Aero Soln inhalation aerosol, Inhale 2 Puffs by mouth every 6 hours as needed for Shortness of Breath., Disp: 8.5 g, Rfl: 1  •  Multiple Vitamin (DAILY VITAMIN PO), Take 1 Tab by mouth every day., Disp: , Rfl:     ALLERGIES  Allergies   Allergen Reactions   • Bactrim Rash   • Iodine Nausea       PHYSICAL EXAM  VITAL SIGNS: /119 mmHg  Pulse 95  Temp(Src) 36.3 °C (97.4 °F) (Temporal)  Resp 20  Ht 1.575 m (5' 2.01\")  Wt 96.2 kg (212 lb 1.3 oz)  BMI 38.78 kg/m2  SpO2 98%  LMP 04/17/2008    Nursing notes and vitals reviewed.  Constitutional: Well developed, Well nourished, No acute distress, Non-toxic appearance.   Eyes: PERRLA, EOMI, Conjunctiva normal, No discharge. "   Cardiovascular: Normal heart rate, Normal rhythm, No murmurs, No rubs, No gallops.   Thorax & Lungs: No respiratory distress, No rales, No rhonchi, No wheezing, No chest tenderness.   Abdomen: Bowel sounds normal, Soft, Obese. Right suprapubic tenderness. Positive Psoas and Obturator's signs. No masses, No pulsatile masses. Positive guarding.   Skin: Warm, Dry, No erythema, No rash.   Musculoskeletal: Intact distal pulses, No edema, No cyanosis, No clubbing. Good range of motion in all major joints. No tenderness to palpation or major deformities noted, no CVA tenderness, no midline back tenderness.   Neurologic: Alert & oriented x 3, Normal motor function, Normal sensory function, No focal deficits noted.  Psychiatric: Affect normal for clinical presentation.    DIAGNOSTIC STUDIES/PROCEDURES    LABS  Results for orders placed or performed during the hospital encounter of 05/16/17   CBC WITH DIFFERENTIAL   Result Value Ref Range    WBC 9.8 4.8 - 10.8 K/uL    RBC 4.55 4.20 - 5.40 M/uL    Hemoglobin 14.3 12.0 - 16.0 g/dL    Hematocrit 42.8 37.0 - 47.0 %    MCV 94.1 81.4 - 97.8 fL    MCH 31.4 27.0 - 33.0 pg    MCHC 33.4 (L) 33.6 - 35.0 g/dL    RDW 43.8 35.9 - 50.0 fL    Platelet Count 217 164 - 446 K/uL    MPV 10.2 9.0 - 12.9 fL    Neutrophils-Polys 52.80 44.00 - 72.00 %    Lymphocytes 40.80 22.00 - 41.00 %    Monocytes 4.90 0.00 - 13.40 %    Eosinophils 1.00 0.00 - 6.90 %    Basophils 0.20 0.00 - 1.80 %    Immature Granulocytes 0.30 0.00 - 0.90 %    Nucleated RBC 0.00 /100 WBC    Neutrophils (Absolute) 5.15 2.00 - 7.15 K/uL    Lymphs (Absolute) 3.99 1.00 - 4.80 K/uL    Monos (Absolute) 0.48 0.00 - 0.85 K/uL    Eos (Absolute) 0.10 0.00 - 0.51 K/uL    Baso (Absolute) 0.02 0.00 - 0.12 K/uL    Immature Granulocytes (abs) 0.03 0.00 - 0.11 K/uL    NRBC (Absolute) 0.00 K/uL   COMP METABOLIC PANEL   Result Value Ref Range    Sodium 136 135 - 145 mmol/L    Potassium 4.3 3.6 - 5.5 mmol/L    Chloride 106 96 - 112 mmol/L    Co2  22 20 - 33 mmol/L    Anion Gap 8.0 0.0 - 11.9    Glucose 86 65 - 99 mg/dL    Bun 17 8 - 22 mg/dL    Creatinine 0.61 0.50 - 1.40 mg/dL    Calcium 9.3 8.5 - 10.5 mg/dL    AST(SGOT) 27 12 - 45 U/L    ALT(SGPT) 14 2 - 50 U/L    Alkaline Phosphatase 69 30 - 99 U/L    Total Bilirubin 0.5 0.1 - 1.5 mg/dL    Albumin 4.2 3.2 - 4.9 g/dL    Total Protein 7.1 6.0 - 8.2 g/dL    Globulin 2.9 1.9 - 3.5 g/dL    A-G Ratio 1.4 g/dL   LIPASE   Result Value Ref Range    Lipase 6 (L) 11 - 82 U/L   ESTIMATED GFR   Result Value Ref Range    GFR If African American >60 >60 mL/min/1.73 m 2    GFR If Non African American >60 >60 mL/min/1.73 m 2   POC UA   Result Value Ref Range    POC Color Yellow     POC Appearance Clear     POC Glucose Negative Negative mg/dL    POC Ketones Trace (A) Negative mg/dL    POC Specific Gravity 1.020 1.005-1.030    POC Blood Trace-intact (A) Negative    POC Urine PH 6.5 5.0-8.0    POC Protein Negative Negative mg/dL    POC Nitrites Negative Negative    POC Leukocyte Esterase Negative Negative   POC URINE PREGNANCY   Result Value Ref Range    POC Urine Pregnancy Test Negative Negative   All labs reviewed by me.    RADIOLOGY  CT-ABDOMEN-PELVIS WITH   Final Result      No acute intra-abdominal abnormality is seen.      Hypodense hepatic lesion likely represents a cyst.      Status post cholecystectomy.      Moderate amount of stool in the ascending colon.      Fat-containing periumbilical hernia.      5 mm right lower lobe pulmonary nodule. Follow-up CT chest in 12 months is recommended.         The radiologist's interpretation of all radiological studies have been reviewed by me.    COURSE & MEDICAL DECISION MAKING  Pertinent Labs & Imaging studies reviewed. (See chart for details)    4:06 PM - Patient seen and examined at bedside. Patient will be treated with Morphine 4 mg, Zofran 4 mg. Ordered CT abdomen, Estimated GFR, POC urine pregnancy, POC UA, CMP, CBC with differential, Lipase, POC urinalysis to evaluate  her symptoms. The differential diagnoses include but are not limited to: diverticulitis, kidney stone, ovarian cyst    5:46 PM - Patient complaining of continued pain. I will order Morphine 4 mg.     6:44 PM Recheck: Patient re-evaluated at [facility]. Discussed patient's condition and treatment plan. Patient's lab and radiology results discussed. Patient will be given discharge instructions. Patient will be sent home with a prescription for Miralax, Ducodyl 5 mg. Advised to follow up with Dr. Oshea and her primary. Instructed to return to Emergency Department immediately if any worsening symptoms.    This is a charming 51 y.o. female that presents with right-sided abdominal pain. As concern for possible appendicitis therefore CT scan IV contrast was completed. Fortunately, CT was negative for acute appendicitis, diverticulitis, cholecystitis, small bowel obstruction, ischemic bowel. The patient does have evidence of significant stool in the ascending colon as well as an umbilical hernia sac containing only. On reevaluation, she had no periumbilical tenderness and has slight right upper lower quadrant tenderness. She is a nonsurgical abdomen, she is not a leukocytosis, she is afebrile. Given the patient a prescription for Cristina lax as well as Dulcolax and asked that she return to the emergency department she has increasing pain as she may have acute infection and evolution that may require further evaluation and possible surgical intervention. The patient will return for new or worsening symptoms and is stable at the time of discharge.    DISPOSITION:  Patient will be discharged home in stable condition.    FOLLOW UP:  [facility], Emergency Dept  [address] [id number] [phone]    If symptoms worsen    Michael Oshea M.D.  [address]  [place] [address]  [phone]    Schedule an appointment as soon as possible for a visit  As needed    Elisabet Samuels  A.P.R.N.  [address]  [place] [address]  [phone]    Schedule an appointment as soon as possible for a visit  As needed      OUTPATIENT MEDICATIONS:  New Prescriptions    BISACODYL (DUCODYL) 5 MG EC TABLET    Take 2 Tabs by mouth every day.    POLYETHYLENE GLYCOL 3350 (MIRALAX) POWDER    Take 17 g by mouth at bedtime as needed (constipation).           FINAL IMPRESSION  1. Right lower quadrant abdominal pain    2. Right upper quadrant abdominal pain    3. Constipation, unspecified constipation type    4. Umbilical hernia without obstruction and without gangrene          IPeng (Scribe), am scribing for, and in the presence of, Larry Bhatia D.O    Electronically signed by: Peng Vega (Scribe), 5/16/2017    ILarry D.O. personally performed the services described in this documentation, as scribed by Peng Vega in my presence, and it is both accurate and complete.    The note accurately reflects work and decisions made by me.  Larry Bhatia  5/16/2017  9:52 PM

## 2017-05-16 NOTE — MR AVS SNAPSHOT
"        Sharmin Mckay Juliane   2017 1:35 PM   Office Visit   MRN: [id number]    Department:  [facility]   Dept Phone:  [phone]    Description:  Female : 1965   Provider:  BILL Tejada           Reason for Visit     RUQ Pain 2 days      Allergies as of 2017     Allergen Noted Reactions    Bactrim 2008   Rash    Iodine 10/17/2016   Nausea      You were diagnosed with     Generalized abdominal pain   [492243]         Vital Signs     Blood Pressure Pulse Temperature Respirations Height Weight    124/80 mmHg 80 36.9 °C (98.4 °F) 20 1.549 m (5' 1\") 97.07 kg (214 lb)    Body Mass Index Oxygen Saturation Last Menstrual Period Smoking Status          40.46 kg/m2 97% 2008 Former Smoker        Basic Information     Date Of Birth Sex Race Ethnicity Preferred Language    1965 Female White Non- English      Your appointments     2017  8:35 AM   Established Patient with KE Negro   [facility])    [address]  [place] [id number] [phone]           You will be receiving a confirmation call a few days before your appointment from our automated call confirmation system.              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 Low  2/3/2017 - Present    Obesity, morbid, BMI 40.0-49.9 (CMS-HCC) E66.01   2017 - Present    Family history of coronary artery disease Z82.49   2017 - Present   " Dyslipidemia E78.5   3/1/2017 - Present    Elevated hemoglobin A1c R73.09   3/1/2017 - Present    Bilateral chronic knee pain M25.561, M25.562, G89.29   3/1/2017 - Present      Health Maintenance        Date Due Completion Dates    IMM DTaP/Tdap/Td Vaccine (1 - Tdap) 8/5/1986 ---    IMM PNEUMOCOCCAL 19-64 (ADULT) MEDIUM RISK SERIES (1 of 1 - PPSV23) 8/5/1986 ---    MAMMOGRAM 8/5/2007 ---            Current Immunizations     No immunizations on file.      Below and/or attached are the medications your provider expects you to take. Review all of your home medications and newly ordered medications with your provider and/or pharmacist. Follow medication instructions as directed by your provider and/or pharmacist. Please keep your medication list with you and share with your provider. Update the information when medications are discontinued, doses are changed, or new medications (including over-the-counter products) are added; and carry medication information at all times in the event of emergency situations     Allergies:  BACTRIM - Rash     IODINE - Nausea               Medications  Valid as of: May 16, 2017 -  2:36 PM    Generic Name Brand Name Tablet Size Instructions for use    Albuterol Sulfate (Aero Soln) albuterol 108 (90 BASE) MCG/ACT Inhale 2 Puffs by mouth every 6 hours as needed for Shortness of Breath.        AmLODIPine Besylate (Tab) NORVASC 10 MG TAKE ONE TABLET BY MOUTH ONCE DAILY        Aspirin (Tablet Delayed Response) ECOTRIN 81 MG Take 81 mg by mouth every day.        Atorvastatin Calcium (Tab) LIPITOR 20 MG         Budesonide-Formoterol Fumarate (Aerosol) SYMBICORT 160-4.5 MCG/ACT Inhale 2 Puffs by mouth 2 Times a Day.        Carvedilol (Tab) COREG 6.25 MG Take 6.25 mg by mouth 2 times a day, with meals.        Cyclobenzaprine HCl (Tab) FLEXERIL 5 MG Take 1-2 Tabs by mouth 3 times a day as needed for Moderate Pain or Muscle Spasms.        Ipratropium-Albuterol (Solution) DUONEB 0.5-2.5 (3) MG/3ML 3  mL by Nebulization route every four hours as needed for Shortness of Breath (bronchospasm).        Multiple Vitamin   Take 1 Tab by mouth every day.        Varenicline Tartrate (Misc) CHANTIX NATHALIE 0.5 MG X 11 & 1 MG X 42 Take as directed        .                 Medicines prescribed today were sent to:     [facility] [address] [place] [address], [place] - [address] [place] [address]    Phone: [phone] Fax: [phone]    Open 24 Hours?: No      Medication refill instructions:       If your prescription bottle indicates you have medication refills left, it is not necessary to call your provider’s office. Please contact your pharmacy and they will refill your medication.    If your prescription bottle indicates you do not have any refills left, you may request refills at any time through one of the following ways: The online [employer] system (except Urgent Care), by calling your provider’s office, or by asking your pharmacy to contact your provider’s office with a refill request. Medication refills are processed only during regular business hours and may not be available until the next business day. Your provider may request additional information or to have a follow-up visit with you prior to refilling your medication.   *Please Note: Medication refills are assigned a new Rx number when refilled electronically. Your pharmacy may indicate that no refills were authorized even though a new prescription for the same medication is available at the pharmacy. Please request the medicine by name with the pharmacy before contacting your provider for a refill.           [employer] Access Code: [email or web address]  Expires: 5/24/2017  1:29 PM    [employer]  A secure, online tool to manage your health information     [employer]’s [employer]® is a secure, online tool that connects you to your personalized health information from the privacy of your home -- day or night - making it very easy for you to manage your  healthcare. Once the activation process is completed, you can even access your medical information using the [employer] nisa, which is available for free in the Apple Nisa store or Google Play store.     [employer] provides the following levels of access (as shown below):   My Chart Features   [facility] Doctor [facility]  Specialists [facility]  Urgent  Care Non-[facility]  Primary Care  Doctor   Email your healthcare team securely and privately 24/7 X X X    Manage appointments: schedule your next appointment; view details of past/upcoming appointments X      Request prescription refills. X      View recent personal medical records, including lab and immunizations X X X X   View health record, including health history, allergies, medications X X X X   Read reports about your outpatient visits, procedures, consult and ER notes X X X X   See your discharge summary, which is a recap of your hospital and/or ER visit that includes your diagnosis, lab results, and care plan. X X       How to register for [employer]:  1. Go to  [email or web address].  2. Click on the Sign Up Now box, which takes you to the New Member Sign Up page. You will need to provide the following information:  a. Enter your [employer] Access Code exactly as it appears at the top of this page. (You will not need to use this code after you’ve completed the sign-up process. If you do not sign up before the expiration date, you must request a new code.)   b. Enter your date of birth.   c. Enter your home email address.   d. Click Submit, and follow the next screen’s instructions.  3. Create a [employer] ID. This will be your [employer] login ID and cannot be changed, so think of one that is secure and easy to remember.  4. Create a [employer] password. You can change your password at any time.  5. Enter your Password Reset Question and Answer. This can be used at a later time if you forget your password.   6. Enter your e-mail address. This allows you to receive e-mail  notifications when new information is available in [employer].  7. Click Sign Up. You can now view your health information.    For assistance activating your [email or web address] account, call [phone]

## 2017-05-16 NOTE — PROGRESS NOTES
"Subjective:      Sharmin Cardozo is a 51 y.o. female who presents with RUQ Pain            RUQ Pain  Associated symptoms include myalgias and nausea. Pertinent negatives include no constipation, diarrhea, dysuria, fever, frequency, headaches, hematuria or vomiting.   Sharmin is a 51 year old female who is here for RUQ pain x 2 days.  has had a dull pain in upper right side of abdomen which has gotten worse today at work.  does not have gall bladder but has appendix. Has nausea without vomiting at this time. States pain level 5-6/10. Increased pain with movement. Pain radiates to back. Denies problems with urination or bowel movements. Holding right side with hand. Denies heartburn or burning feeling in epigastric area. States \"feels hot\" on right side of abdomen.    PMH:  has a past medical history of Physiological ovarian cysts; ASTHMA; and Hypertension.  MEDS:   Current outpatient prescriptions:   •  amlodipine (NORVASC) 10 MG Tab, TAKE ONE TABLET BY MOUTH ONCE DAILY, Disp: 90 Tab, Rfl: 1  •  atorvastatin (LIPITOR) 20 MG Tab, , Disp: , Rfl:   •  aspirin EC (ECOTRIN) 81 MG Tablet Delayed Response, Take 81 mg by mouth every day., Disp: , Rfl:   •  carvedilol (COREG) 6.25 MG Tab, Take 6.25 mg by mouth 2 times a day, with meals., Disp: , Rfl:   •  varenicline (CHANTIX STARTING MONTH PAK) 0.5 MG X 11 & 1 MG X 42 tablet, Take as directed, Disp: 56 Tab, Rfl: 3  •  cyclobenzaprine (FLEXERIL) 5 MG tablet, Take 1-2 Tabs by mouth 3 times a day as needed for Moderate Pain or Muscle Spasms., Disp: 30 Tab, Rfl: 0  •  ipratropium-albuterol (DUONEB) 0.5-2.5 (3) MG/3ML nebulizer solution, 3 mL by Nebulization route every four hours as needed for Shortness of Breath (bronchospasm)., Disp: 75 mL, Rfl: 0  •  budesonide-formoterol (SYMBICORT) 160-4.5 MCG/ACT Aerosol, Inhale 2 Puffs by mouth 2 Times a Day., Disp: 2 Inhaler, Rfl: 0  •  albuterol 108 (90 BASE) MCG/ACT Aero Soln inhalation aerosol, Inhale 2 Puffs by mouth " "every 6 hours as needed for Shortness of Breath., Disp: 8.5 g, Rfl: 1  •  Multiple Vitamin (DAILY VITAMIN PO), Take 1 Tab by mouth every day., Disp: , Rfl:   ALLERGIES:   Allergies   Allergen Reactions   • Bactrim Rash   • Iodine Nausea     SURGHX:   Past Surgical History   Procedure Laterality Date   • Pr vag deliv only,prev c-sectn       x 4   • Tubal ligation     • Cystoscopy  3/24/2009     Performed by ZORAN BRIGGS at [facility]   • Cholecystectomy     • Vaginal hysterectomy scope total  3/24/2009     [facility]   • Other       left elbow     SOCHX:  reports that she quit smoking about 3 months ago. She has never used smokeless tobacco. She reports that she does not drink alcohol or use illicit drugs.  FH: Family history was reviewed, no pertinent findings to report       Review of Systems   Constitutional: Negative for fever, chills and malaise/fatigue.   Respiratory: Negative for shortness of breath.    Cardiovascular: Negative for chest pain, palpitations and orthopnea.   Gastrointestinal: Positive for nausea and abdominal pain. Negative for heartburn, vomiting, diarrhea and constipation.   Genitourinary: Negative for dysuria, urgency, frequency, hematuria and flank pain.   Musculoskeletal: Positive for myalgias and back pain. Negative for falls.   Neurological: Negative for dizziness, tingling, sensory change, weakness and headaches.   All other systems reviewed and are negative.         Objective:     /80 mmHg  Pulse 80  Temp(Src) 36.9 °C (98.4 °F)  Resp 20  Ht 1.549 m (5' 1\")  Wt 97.07 kg (214 lb)  BMI 40.46 kg/m2  SpO2 97%  LMP 04/17/2008     Physical Exam   Constitutional: She is oriented to person, place, and time. Vital signs are normal. She appears well-developed and well-nourished.  Non-toxic appearance. She does not have a sickly appearance. She appears ill. No distress.   HENT:   Head: Normocephalic.   Eyes: " Conjunctivae and EOM are normal. Pupils are equal, round, and reactive to light.   Neck: Normal range of motion. Neck supple.   Cardiovascular: Normal rate and regular rhythm.    Pulmonary/Chest: Effort normal and breath sounds normal.   Abdominal: Soft. She exhibits distension. There is tenderness in the right lower quadrant. There is rebound and guarding. There is no rigidity and no CVA tenderness.   Wide abdominal girth, difficulty with palpation but has increased TTP at right mid to right lower abdomen as well as mid abdomen with rebound pain, holds side with ambulation   Musculoskeletal: Normal range of motion.   Neurological: She is alert and oriented to person, place, and time.   Skin: Skin is warm and dry. She is not diaphoretic.   Vitals reviewed.     Patient refuses ambulance at this time, states mother to take her to ER  Assessment/Plan:     1. Generalized abdominal pain    - UC AMA/Refusal of Treatment    Refer to ER to r/o appendicitis  Patient to get ride from mother to ER  Patient states is taking her car home first then going to ER, recommend ambulance, notified patient if she does get home and pain has increased, please call 911, patient agrees to this

## 2017-05-16 NOTE — ED AVS SNAPSHOT
5/16/2017    Sharmin [place] Jul[place]  [address]  [place] [address]    Dear Sharmin:    [facility] wants to ensure your discharge home is safe and you or your loved ones have had all of your questions answered regarding your care after you leave the hospital.    Below is a list of resources and contact information should you have any questions regarding your hospital stay, follow-up instructions, or active medical symptoms.    Questions or Concerns Regarding… Contact   Medical Questions Related to Your Discharge  (7 days a week, 8am-5pm) Contact a Nurse Care Coordinator   [phone]   Medical Questions Not Related to Your Discharge  (24 hours a day / 7 days a week)  Contact the Nurse Health Line   [phone]    Medications or Discharge Instructions Refer to your discharge packet   or contact your [facility] Primary Care Provider   [phone]   Follow-up Appointment(s) Schedule your appointment via [employer]   or contact Scheduling [phone]   Billing Review your statement via [employer]  or contact Billing [phone]   Medical Records Review your records via [employer]   or contact Medical Records [phone]     You may receive a telephone call within two days of discharge. This call is to make certain you understand your discharge instructions and have the opportunity to have any questions answered. You can also easily access your medical information, test results and upcoming appointments via the [employer] free online health management tool. You can learn more and sign up at [employer]. For assistance setting up your [employer] account, please call [phone].    Once again, we want to ensure your discharge home is safe and that you have a clear understanding of any next steps in your care. If you have any questions or concerns, please do not hesitate to contact us, we are here for you. Thank you for choosing [facility] for your healthcare needs.    Sincerely,    Your [facility] Healthcare Team

## 2017-05-16 NOTE — ED AVS SNAPSHOT
Home Care Instructions                                                                                                                Sharmin Cardozo   MRN: [id number]    Department:  [facility], Emergency Dept   Date of Visit:  5/16/2017            [facility], Emergency Dept    [address] [place] [id number]    Phone:  [phone]      You were seen by     Larry Bhatia D.O.      Your Diagnosis Was     Right lower quadrant abdominal pain     R10.31       These are the medications you received during your hospitalization from 05/16/2017 1534 to 05/16/2017 1846     Date/Time Order Dose Route Action    05/16/2017 1634 morphine (pf) 4 mg/ml injection 4 mg 4 mg Intravenous Given    05/16/2017 1634 ondansetron (ZOFRAN) syringe/vial injection 4 mg 4 mg Intravenous Given    05/16/2017 1743 iohexol (OMNIPAQUE) 350 mg/mL 100 mL Intravenous Given    05/16/2017 1805 morphine (pf) 4 mg/ml injection 4 mg 4 mg Intravenous Given      Follow-up Information     1. Follow up with [facility], Emergency Dept.    Specialty:  Emergency Medicine    Why:  If symptoms worsen    Contact information    [address] [place] [address] [phone]        2. Schedule an appointment as soon as possible for a visit with Michael Oshea M.D..    Specialty:  Surgery    Why:  As needed    Contact information    [address]  [facility] [id number] [phone]          3. Schedule an appointment as soon as possible for a visit with KE Negro.    Specialty:  Family Medicine    Why:  As needed    Contact information    [address]  [facility] [address] [phone]        Medication Information     Review all of your home medications and newly ordered medications with your primary doctor and/or pharmacist as soon as possible. Follow medication instructions as directed by your doctor and/or pharmacist.     Please  keep your complete medication list with you and share with your physician. Update the information when medications are discontinued, doses are changed, or new medications (including over-the-counter products) are added; and carry medication information at all times in the event of emergency situations.               Medication List      START taking these medications        Instructions    Morning Afternoon Evening Bedtime    bisacodyl 5 MG EC tablet   Commonly known as:  DUCODYL        Take 2 Tabs by mouth every day.   Dose:  10 mg                        polyethylene glycol 3350 Powd   Commonly known as:  MIRALAX        Take 17 g by mouth at bedtime as needed (constipation).   Dose:  17 g                          ASK your doctor about these medications        Instructions    Morning Afternoon Evening Bedtime    albuterol 108 (90 BASE) MCG/ACT Aers inhalation aerosol        Inhale 2 Puffs by mouth every 6 hours as needed for Shortness of Breath.   Dose:  2 Puff                        amlodipine 10 MG Tabs   Commonly known as:  NORVASC        Doctor's comments:  This is an authorization for a requested refill   TAKE ONE TABLET BY MOUTH ONCE DAILY                        aspirin EC 81 MG Tbec   Commonly known as:  ECOTRIN        Take 81 mg by mouth every day.   Dose:  81 mg                        atorvastatin 20 MG Tabs   Commonly known as:  LIPITOR                             budesonide-formoterol 160-4.5 MCG/ACT Aero   Commonly known as:  SYMBICORT        Inhale 2 Puffs by mouth 2 Times a Day.   Dose:  2 Puff                        carvedilol 6.25 MG Tabs   Commonly known as:  COREG        Take 6.25 mg by mouth 2 times a day, with meals.   Dose:  6.25 mg                        cyclobenzaprine 5 MG tablet   Commonly known as:  FLEXERIL        Take 1-2 Tabs by mouth 3 times a day as needed for Moderate Pain or Muscle Spasms.   Dose:  5-10 mg                        DAILY VITAMIN PO        Take 1 Tab by mouth every day.     Dose:  1 Tab                        ipratropium-albuterol 0.5-2.5 (3) MG/3ML nebulizer solution   Commonly known as:  DUONEB        3 mL by Nebulization route every four hours as needed for Shortness of Breath (bronchospasm).   Dose:  3 mL                        varenicline 0.5 MG X 11 & 1 MG X 42 tablet   Commonly known as:  CHANTIX STARTING MONTH NATHALIE        Take as directed                             Where to Get Your Medications      You can get these medications from any pharmacy     Bring a paper prescription for each of these medications    - bisacodyl 5 MG EC tablet  - polyethylene glycol 3350 Powd            Procedures and tests performed during your visit     CBC WITH DIFFERENTIAL    COMP METABOLIC PANEL    CONSENT FOR CONTRAST INJECTION    CT-ABDOMEN-PELVIS WITH    ESTIMATED GFR    IV Saline Lock    LIPASE    POC UA    POC URINE PREGNANCY        Discharge Instructions       Constipation, Adult  Constipation is when a person has fewer than three bowel movements a week, has difficulty having a bowel movement, or has stools that are dry, hard, or larger than normal. As people grow older, constipation is more common. A low-fiber diet, not taking in enough fluids, and taking certain medicines may make constipation worse.   CAUSES   · Certain medicines, such as antidepressants, pain medicine, iron supplements, antacids, and water pills.    · Certain diseases, such as diabetes, irritable bowel syndrome (IBS), thyroid disease, or depression.    · Not drinking enough water.    · Not eating enough fiber-rich foods.    · Stress or travel.    · Lack of physical activity or exercise.    · Ignoring the urge to have a bowel movement.    · Using laxatives too much.    SIGNS AND SYMPTOMS   · Having fewer than three bowel movements a week.    · Straining to have a bowel movement.    · Having stools that are hard, dry, or larger than normal.    · Feeling full or bloated.    · Pain in the lower abdomen.    · Not feeling  relief after having a bowel movement.    DIAGNOSIS   Your health care provider will take a medical history and perform a physical exam. Further testing may be done for severe constipation. Some tests may include:  · A barium enema X-ray to examine your rectum, colon, and, sometimes, your small intestine.    · A sigmoidoscopy to examine your lower colon.    · A colonoscopy to examine your entire colon.  TREATMENT   Treatment will depend on the severity of your constipation and what is causing it. Some dietary treatments include drinking more fluids and eating more fiber-rich foods. Lifestyle treatments may include regular exercise. If these diet and lifestyle recommendations do not help, your health care provider may recommend taking over-the-counter laxative medicines to help you have bowel movements. Prescription medicines may be prescribed if over-the-counter medicines do not work.   HOME CARE INSTRUCTIONS   · Eat foods that have a lot of fiber, such as fruits, vegetables, whole grains, and beans.  · Limit foods high in fat and processed sugars, such as french fries, hamburgers, cookies, candies, and soda.    · A fiber supplement may be added to your diet if you cannot get enough fiber from foods.    · Drink enough fluids to keep your urine clear or pale yellow.    · Exercise regularly or as directed by your health care provider.    · Go to the restroom when you have the urge to go. Do not hold it.    · Only take over-the-counter or prescription medicines as directed by your health care provider. Do not take other medicines for constipation without talking to your health care provider first.    SEEK IMMEDIATE MEDICAL CARE IF:   · You have bright red blood in your stool.    · Your constipation lasts for more than 4 days or gets worse.    · You have abdominal or rectal pain.    · You have thin, pencil-like stools.    · You have unexplained weight loss.  MAKE SURE YOU:   · Understand these instructions.  · Will watch  your condition.  · Will get help right away if you are not doing well or get worse.     This information is not intended to replace advice given to you by your health care provider. Make sure you discuss any questions you have with your health care provider.     Document Released: 09/15/2005 Document Revised: 01/08/2016 Document Reviewed: 09/29/2014  [employer] Interactive Patient Education ©2016 Elsevier Inc.      Abdominal Pain (Nonspecific)  Your exam might not show the exact reason you have abdominal pain. Since there are many different causes of abdominal pain, another checkup and more tests may be needed. It is very important to follow up for lasting (persistent) or worsening symptoms. A possible cause of abdominal pain in any person who still has his or her appendix is acute appendicitis. Appendicitis is often hard to diagnose. Normal blood tests, urine tests, ultrasound, and CT scans do not completely rule out early appendicitis or other causes of abdominal pain. Sometimes, only the changes that happen over time will allow appendicitis and other causes of abdominal pain to be determined. Other potential problems that may require surgery may also take time to become more apparent. Because of this, it is important that you follow all of the instructions below.  HOME CARE INSTRUCTIONS   · Rest as much as possible.   · Do not eat solid food until your pain is gone.   · While adults or children have pain: A diet of water, weak decaffeinated tea, broth or bouillon, gelatin, oral rehydration solutions (ORS), frozen ice pops, or ice chips may be helpful.   · When pain is gone in adults or children: Start a light diet (dry toast, crackers, applesauce, or white rice). Increase the diet slowly as long as it does not bother you. Eat no dairy products (including cheese and eggs) and no spicy, fatty, fried, or high-fiber foods.   · Use no alcohol, caffeine, or cigarettes.   · Take your regular medicines unless your  caregiver told you not to.   · Take any prescribed medicine as directed.   · Only take over-the-counter or prescription medicines for pain, discomfort, or fever as directed by your caregiver. Do not give aspirin to children.   If your caregiver has given you a follow-up appointment, it is very important to keep that appointment. Not keeping the appointment could result in a permanent injury and/or lasting (chronic) pain and/or disability. If there is any problem keeping the appointment, you must call to reschedule.   SEEK IMMEDIATE MEDICAL CARE IF:   · Your pain is not gone in 24 hours.   · Your pain becomes worse, changes location, or feels different.   · You or your child has an oral temperature above 102° F (38.9° C), not controlled by medicine.   · Your baby is older than 3 months with a rectal temperature of 102° F (38.9° C) or higher.   · Your baby is 3 months old or younger with a rectal temperature of 100.4° F (38° C) or higher.   · You have shaking chills.   · You keep throwing up (vomiting) or cannot drink liquids.   · There is blood in your vomit or you see blood in your bowel movements.   · Your bowel movements become dark or black.   · You have frequent bowel movements.   · Your bowel movements stop (become blocked) or you cannot pass gas.   · You have bloody, frequent, or painful urination.   · You have yellow discoloration in the skin or whites of the eyes.   · Your stomach becomes bloated or bigger.   · You have dizziness or fainting.   · You have chest or back pain.   MAKE SURE YOU:   · Understand these instructions.   · Will watch your condition.   · Will get help right away if you are not doing well or get worse.   Document Released: 12/18/2006 Document Revised: 03/11/2013 Document Reviewed: 11/15/2010  [employer]® Patient Information ©2013 [employer].    Hernia, Adult  A hernia is the bulging of an organ or tissue through a weak spot in the muscles of the abdomen (abdominal wall). Hernias  develop most often near the navel or groin.  There are many kinds of hernias. Common kinds include:  · Femoral hernia. This kind of hernia develops under the groin in the upper thigh area.  · Inguinal hernia. This kind of hernia develops in the groin or scrotum.  · Umbilical hernia. This kind of hernia develops near the navel.  · Hiatal hernia. This kind of hernia causes part of the stomach to be pushed up into the chest.  · Incisional hernia. This kind of hernia bulges through a scar from an abdominal surgery.  CAUSES  This condition may be caused by:  · Heavy lifting.  · Coughing over a long period of time.  · Straining to have a bowel movement.  · An incision made during an abdominal surgery.  · A birth defect (congenital defect).  · Excess weight or obesity.  · Smoking.  · Poor nutrition.  · Cystic fibrosis.  · Excess fluid in the abdomen.  · Undescended testicles.  SYMPTOMS  Symptoms of a hernia include:  · A lump on the abdomen. This is the first sign of a hernia. The lump may become more obvious with standing, straining, or coughing. It may get bigger over time if it is not treated or if the condition causing it is not treated.  · Pain. A hernia is usually painless, but it may become painful over time if treatment is delayed. The pain is usually dull and may get worse with standing or lifting heavy objects.  Sometimes a hernia gets tightly squeezed in the weak spot (strangulated) or stuck there (incarcerated) and causes additional symptoms. These symptoms may include:  · Vomiting.  · Nausea.  · Constipation.  · Irritability.  DIAGNOSIS  A hernia may be diagnosed with:  · A physical exam. During the exam your health care provider may ask you to cough or to make a specific movement, because a hernia is usually more visible when you move.  · Imaging tests. These can include:  ¨ X-rays.  ¨ Ultrasound.  ¨ CT scan.  TREATMENT  A hernia that is small and painless may not need to be treated. A hernia that is large or  painful may be treated with surgery. Inguinal hernias may be treated with surgery to prevent incarceration or strangulation. Strangulated hernias are always treated with surgery, because lack of blood to the trapped organ or tissue can cause it to die.  Surgery to treat a hernia involves pushing the bulge back into place and repairing the weak part of the abdomen.  HOME CARE INSTRUCTIONS  · Avoid straining.  · Do not lift anything heavier than 10 lb (4.5 kg).  · Lift with your leg muscles, not your back muscles. This helps avoid strain.  · When coughing, try to cough gently.  · Prevent constipation. Constipation leads to straining with bowel movements, which can make a hernia worse or cause a hernia repair to break down. You can prevent constipation by:  ¨ Eating a high-fiber diet that includes plenty of fruits and vegetables.  ¨ Drinking enough fluids to keep your urine clear or pale yellow. Aim to drink 6-8 glasses of water per day.  ¨ Using a stool softener as directed by your health care provider.  · Lose weight, if you are overweight.  · Do not use any tobacco products, including cigarettes, chewing tobacco, or electronic cigarettes. If you need help quitting, ask your health care provider.  · Keep all follow-up visits as directed by your health care provider. This is important. Your health care provider may need to monitor your condition.  SEEK MEDICAL CARE IF:  · You have swelling, redness, and pain in the affected area.  · Your bowel habits change.  SEEK IMMEDIATE MEDICAL CARE IF:  · You have a fever.  · You have abdominal pain that is getting worse.  · You feel nauseous or you vomit.  · You cannot push the hernia back in place by gently pressing on it while you are lying down.  · The hernia:  ¨ Changes in shape or size.  ¨ Is stuck outside the abdomen.  ¨ Becomes discolored.  ¨ Feels hard or tender.     This information is not intended to replace advice given to you by your health care provider. Make sure  you discuss any questions you have with your health care provider.     Document Released: 12/18/2006 Document Revised: 01/08/2016 Document Reviewed: 10/28/2015  Elsevier Interactive Patient Education ©2016 Elsevier Inc.            Patient Information     Patient Information    Following emergency treatment: all patient requiring follow-up care must return either to a private physician or a clinic if your condition worsens before you are able to obtain further medical attention, please return to the emergency room.     Billing Information    At [facility], we work to make the billing process streamlined for our patients.  Our Representatives are here to answer any questions you may have regarding your hospital bill.  If you have insurance coverage and have supplied your insurance information to us, we will submit a claim to your insurer on your behalf.  Should you have any questions regarding your bill, we can be reached online or by phone as follows:  Online: You are able pay your bills online or live chat with our representatives about any billing questions you may have. We are here to help Monday - Friday from 8:00am to 7:30pm and 9:00am - 12:00pm on Saturdays.  Please visit [email or web address]  for more information.   Phone:  [phone] or [phone]    Please note that your emergency physician, surgeon, pathologist, radiologist, anesthesiologist, and other specialists are not employed by [facility] and will therefore bill separately for their services.  Please contact them directly for any questions concerning their bills at the numbers below:     Emergency Physician Services:  [phone]  [facility]:  [phone]  [facility]:  [phone]  [facility]:  [phone]    1. Your final bill may vary from the amount quoted upon discharge if all procedures are not complete at that time, or if your doctor has additional  procedures of which we are not aware. You will receive an additional bill if you return to the Emergency Department at [facility] for suture removal regardless of the facility of which the sutures were placed.     2. Please arrange for settlement of this account at the emergency registration.    3. All self-pay accounts are due in full at the time of treatment.  If you are unable to meet this obligation then payment is expected within 4-5 days.     4. If you have had radiology studies (CT, X-ray, Ultrasound, MRI), you have received a preliminary result during your emergency department visit. Please contact the radiology department [phone] to receive a copy of your final result. Please discuss the Final result with your primary physician or with the follow up physician provided.     Crisis Hotline:  [place] Crisis Hotline:  6-016-SUWETIR or 1-833.198.9065  [place] Crisis Hotline:    1-403.241.8214 or 466-365-4405         ED Discharge Follow Up Questions    1. In order to provide you with very good care, we would like to follow up with a phone call in the next few days.  May we have your permission to contact you?     YES /  NO    2. What is the best phone number to call you? (       )_____-__________    3. What is the best time to call you?      Morning  /  Afternoon  /  Evening                   Patient Signature:  ____________________________________________________________    Date:  ____________________________________________________________      Your appointments     Jun 07, 2017  8:35 AM   Established Patient with KE Negro   [facility])    [address], [address]  [facility] [address] [phone]           You will be receiving a confirmation call a few days before your appointment from our automated call confirmation system.

## 2017-05-16 NOTE — ED NOTES
Sharmin Mckay [place]  51 y.o.  Chief Complaint   Patient presents with   • Abdominal Pain     right sided.      Sent by urgent care for concerns for appendicitis. Pt reports nausea and dry heaves. Denies vomiting. Pain worsening x 2 days.

## 2017-05-16 NOTE — ED AVS SNAPSHOT
[employer] Access Code: [email or web address]  Expires: 5/24/2017  1:29 PM    [employer]  A secure, online tool to manage your health information     [employer]’s [employer]® is a secure, online tool that connects you to your personalized health information from the privacy of your home -- day or night - making it very easy for you to manage your healthcare. Once the activation process is completed, you can even access your medical information using the [employer] nisa, which is available for free in the [employer] Nisa store or Google Play store.     [employer] provides the following levels of access (as shown below):   My Chart Features   [facility] Primary Care Doctor [facility]  Specialists [facility]  Urgent  Care Non-[facility]  Primary Care  Doctor   Email your healthcare team securely and privately 24/7 X X X X   Manage appointments: schedule your next appointment; view details of past/upcoming appointments X      Request prescription refills. X      View recent personal medical records, including lab and immunizations X X X X   View health record, including health history, allergies, medications X X X X   Read reports about your outpatient visits, procedures, consult and ER notes X X X X   See your discharge summary, which is a recap of your hospital and/or ER visit that includes your diagnosis, lab results, and care plan. X X       How to register for [employer]:  1. Go to  [email or web address].  2. Click on the Sign Up Now box, which takes you to the New Member Sign Up page. You will need to provide the following information:  a. Enter your [employer] Access Code exactly as it appears at the top of this page. (You will not need to use this code after you’ve completed the sign-up process. If you do not sign up before the expiration date, you must request a new code.)   b. Enter your date of birth.   c. Enter your home email address.   d. Click Submit, and follow the next screen’s instructions.  3. Create a [employer] ID. This will be your [employer]  login ID and cannot be changed, so think of one that is secure and easy to remember.  4. Create a [employer] password. You can change your password at any time.  5. Enter your Password Reset Question and Answer. This can be used at a later time if you forget your password.   6. Enter your e-mail address. This allows you to receive e-mail notifications when new information is available in [employer].  7. Click Sign Up. You can now view your health information.    For assistance activating your [employer] account, call [phone]

## 2017-05-17 NOTE — ED NOTES
Discharge instructions, meds, & f/u appts rv'wd with Pt, she verbalizes understanding. Prescriptions given x2. Stable on discharge. Ambulated out of ER with  without difficulty.

## 2017-05-17 NOTE — DISCHARGE INSTRUCTIONS
Constipation, Adult  Constipation is when a person has fewer than three bowel movements a week, has difficulty having a bowel movement, or has stools that are dry, hard, or larger than normal. As people grow older, constipation is more common. A low-fiber diet, not taking in enough fluids, and taking certain medicines may make constipation worse.   CAUSES   · Certain medicines, such as antidepressants, pain medicine, iron supplements, antacids, and water pills.    · Certain diseases, such as diabetes, irritable bowel syndrome (IBS), thyroid disease, or depression.    · Not drinking enough water.    · Not eating enough fiber-rich foods.    · Stress or travel.    · Lack of physical activity or exercise.    · Ignoring the urge to have a bowel movement.    · Using laxatives too much.    SIGNS AND SYMPTOMS   · Having fewer than three bowel movements a week.    · Straining to have a bowel movement.    · Having stools that are hard, dry, or larger than normal.    · Feeling full or bloated.    · Pain in the lower abdomen.    · Not feeling relief after having a bowel movement.    DIAGNOSIS   Your health care provider will take a medical history and perform a physical exam. Further testing may be done for severe constipation. Some tests may include:  · A barium enema X-ray to examine your rectum, colon, and, sometimes, your small intestine.    · A sigmoidoscopy to examine your lower colon.    · A colonoscopy to examine your entire colon.  TREATMENT   Treatment will depend on the severity of your constipation and what is causing it. Some dietary treatments include drinking more fluids and eating more fiber-rich foods. Lifestyle treatments may include regular exercise. If these diet and lifestyle recommendations do not help, your health care provider may recommend taking over-the-counter laxative medicines to help you have bowel movements. Prescription medicines may be prescribed if over-the-counter medicines do not work.    HOME CARE INSTRUCTIONS   · Eat foods that have a lot of fiber, such as fruits, vegetables, whole grains, and beans.  · Limit foods high in fat and processed sugars, such as french fries, hamburgers, cookies, candies, and soda.    · A fiber supplement may be added to your diet if you cannot get enough fiber from foods.    · Drink enough fluids to keep your urine clear or pale yellow.    · Exercise regularly or as directed by your health care provider.    · Go to the restroom when you have the urge to go. Do not hold it.    · Only take over-the-counter or prescription medicines as directed by your health care provider. Do not take other medicines for constipation without talking to your health care provider first.    SEEK IMMEDIATE MEDICAL CARE IF:   · You have bright red blood in your stool.    · Your constipation lasts for more than 4 days or gets worse.    · You have abdominal or rectal pain.    · You have thin, pencil-like stools.    · You have unexplained weight loss.  MAKE SURE YOU:   · Understand these instructions.  · Will watch your condition.  · Will get help right away if you are not doing well or get worse.     This information is not intended to replace advice given to you by your health care provider. Make sure you discuss any questions you have with your health care provider.     Document Released: 09/15/2005 Document Revised: 01/08/2016 Document Reviewed: 09/29/2014  [employer] Interactive Patient Education ©2016 [employer]      Abdominal Pain (Nonspecific)  Your exam might not show the exact reason you have abdominal pain. Since there are many different causes of abdominal pain, another checkup and more tests may be needed. It is very important to follow up for lasting (persistent) or worsening symptoms. A possible cause of abdominal pain in any person who still has his or her appendix is acute appendicitis. Appendicitis is often hard to diagnose. Normal blood tests, urine tests, ultrasound, and CT  scans do not completely rule out early appendicitis or other causes of abdominal pain. Sometimes, only the changes that happen over time will allow appendicitis and other causes of abdominal pain to be determined. Other potential problems that may require surgery may also take time to become more apparent. Because of this, it is important that you follow all of the instructions below.  HOME CARE INSTRUCTIONS   · Rest as much as possible.   · Do not eat solid food until your pain is gone.   · While adults or children have pain: A diet of water, weak decaffeinated tea, broth or bouillon, gelatin, oral rehydration solutions (ORS), frozen ice pops, or ice chips may be helpful.   · When pain is gone in adults or children: Start a light diet (dry toast, crackers, applesauce, or white rice). Increase the diet slowly as long as it does not bother you. Eat no dairy products (including cheese and eggs) and no spicy, fatty, fried, or high-fiber foods.   · Use no alcohol, caffeine, or cigarettes.   · Take your regular medicines unless your caregiver told you not to.   · Take any prescribed medicine as directed.   · Only take over-the-counter or prescription medicines for pain, discomfort, or fever as directed by your caregiver. Do not give aspirin to children.   If your caregiver has given you a follow-up appointment, it is very important to keep that appointment. Not keeping the appointment could result in a permanent injury and/or lasting (chronic) pain and/or disability. If there is any problem keeping the appointment, you must call to reschedule.   SEEK IMMEDIATE MEDICAL CARE IF:   · Your pain is not gone in 24 hours.   · Your pain becomes worse, changes location, or feels different.   · You or your child has an oral temperature above 102° F (38.9° C), not controlled by medicine.   · Your baby is older than 3 months with a rectal temperature of 102° F (38.9° C) or higher.   · Your baby is 3 months old or younger with a  rectal temperature of 100.4° F (38° C) or higher.   · You have shaking chills.   · You keep throwing up (vomiting) or cannot drink liquids.   · There is blood in your vomit or you see blood in your bowel movements.   · Your bowel movements become dark or black.   · You have frequent bowel movements.   · Your bowel movements stop (become blocked) or you cannot pass gas.   · You have bloody, frequent, or painful urination.   · You have yellow discoloration in the skin or whites of the eyes.   · Your stomach becomes bloated or bigger.   · You have dizziness or fainting.   · You have chest or back pain.   MAKE SURE YOU:   · Understand these instructions.   · Will watch your condition.   · Will get help right away if you are not doing well or get worse.   Document Released: 12/18/2006 Document Revised: 03/11/2013 Document Reviewed: 11/15/2010  [employer]® Patient Information ©2013 [employer].    Hernia, Adult  A hernia is the bulging of an organ or tissue through a weak spot in the muscles of the abdomen (abdominal wall). Hernias develop most often near the navel or groin.  There are many kinds of hernias. Common kinds include:  · Femoral hernia. This kind of hernia develops under the groin in the upper thigh area.  · Inguinal hernia. This kind of hernia develops in the groin or scrotum.  · Umbilical hernia. This kind of hernia develops near the navel.  · Hiatal hernia. This kind of hernia causes part of the stomach to be pushed up into the chest.  · Incisional hernia. This kind of hernia bulges through a scar from an abdominal surgery.  CAUSES  This condition may be caused by:  · Heavy lifting.  · Coughing over a long period of time.  · Straining to have a bowel movement.  · An incision made during an abdominal surgery.  · A birth defect (congenital defect).  · Excess weight or obesity.  · Smoking.  · Poor nutrition.  · Cystic fibrosis.  · Excess fluid in the abdomen.  · Undescended testicles.  SYMPTOMS  Symptoms of a  hernia include:  · A lump on the abdomen. This is the first sign of a hernia. The lump may become more obvious with standing, straining, or coughing. It may get bigger over time if it is not treated or if the condition causing it is not treated.  · Pain. A hernia is usually painless, but it may become painful over time if treatment is delayed. The pain is usually dull and may get worse with standing or lifting heavy objects.  Sometimes a hernia gets tightly squeezed in the weak spot (strangulated) or stuck there (incarcerated) and causes additional symptoms. These symptoms may include:  · Vomiting.  · Nausea.  · Constipation.  · Irritability.  DIAGNOSIS  A hernia may be diagnosed with:  · A physical exam. During the exam your health care provider may ask you to cough or to make a specific movement, because a hernia is usually more visible when you move.  · Imaging tests. These can include:  ¨ X-rays.  ¨ Ultrasound.  ¨ CT scan.  TREATMENT  A hernia that is small and painless may not need to be treated. A hernia that is large or painful may be treated with surgery. Inguinal hernias may be treated with surgery to prevent incarceration or strangulation. Strangulated hernias are always treated with surgery, because lack of blood to the trapped organ or tissue can cause it to die.  Surgery to treat a hernia involves pushing the bulge back into place and repairing the weak part of the abdomen.  HOME CARE INSTRUCTIONS  · Avoid straining.  · Do not lift anything heavier than 10 lb (4.5 kg).  · Lift with your leg muscles, not your back muscles. This helps avoid strain.  · When coughing, try to cough gently.  · Prevent constipation. Constipation leads to straining with bowel movements, which can make a hernia worse or cause a hernia repair to break down. You can prevent constipation by:  ¨ Eating a high-fiber diet that includes plenty of fruits and vegetables.  ¨ Drinking enough fluids to keep your urine clear or pale yellow.  Aim to drink 6-8 glasses of water per day.  ¨ Using a stool softener as directed by your health care provider.  · Lose weight, if you are overweight.  · Do not use any tobacco products, including cigarettes, chewing tobacco, or electronic cigarettes. If you need help quitting, ask your health care provider.  · Keep all follow-up visits as directed by your health care provider. This is important. Your health care provider may need to monitor your condition.  SEEK MEDICAL CARE IF:  · You have swelling, redness, and pain in the affected area.  · Your bowel habits change.  SEEK IMMEDIATE MEDICAL CARE IF:  · You have a fever.  · You have abdominal pain that is getting worse.  · You feel nauseous or you vomit.  · You cannot push the hernia back in place by gently pressing on it while you are lying down.  · The hernia:  ¨ Changes in shape or size.  ¨ Is stuck outside the abdomen.  ¨ Becomes discolored.  ¨ Feels hard or tender.     This information is not intended to replace advice given to you by your health care provider. Make sure you discuss any questions you have with your health care provider.     Document Released: 12/18/2006 Document Revised: 01/08/2016 Document Reviewed: 10/28/2015  [employer] Interactive Patient Education ©2016 [employer]

## 2017-07-11 ENCOUNTER — OFFICE VISIT (OUTPATIENT)
Dept: MEDICAL GROUP | Facility: PHYSICIAN GROUP | Age: 52
End: 2017-07-11
Payer: COMMERCIAL

## 2017-07-11 ENCOUNTER — APPOINTMENT (OUTPATIENT)
Dept: RADIOLOGY | Facility: IMAGING CENTER | Age: 52
End: 2017-07-11
Attending: NURSE PRACTITIONER
Payer: COMMERCIAL

## 2017-07-11 VITALS
BODY MASS INDEX: 40.59 KG/M2 | SYSTOLIC BLOOD PRESSURE: 126 MMHG | HEART RATE: 82 BPM | RESPIRATION RATE: 16 BRPM | OXYGEN SATURATION: 98 % | TEMPERATURE: 98.2 F | HEIGHT: 61 IN | DIASTOLIC BLOOD PRESSURE: 80 MMHG | WEIGHT: 215 LBS

## 2017-07-11 DIAGNOSIS — I10 ESSENTIAL HYPERTENSION: ICD-10-CM
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
## 2017-07-11 DIAGNOSIS — S89.91XA RIGHT KNEE INJURY, INITIAL ENCOUNTER: ICD-10-CM

## 2017-07-11 DIAGNOSIS — R73.09 ELEVATED HEMOGLOBIN A1C: ICD-10-CM

## 2017-07-11 DIAGNOSIS — Z00.00 PREVENTATIVE HEALTH CARE: ICD-10-CM

## 2017-07-11 DIAGNOSIS — G89.29 BILATERAL CHRONIC KNEE PAIN: ICD-10-CM

## 2017-07-11 DIAGNOSIS — J44.89 COPD WITH ASTHMA: ICD-10-CM

## 2017-07-11 DIAGNOSIS — M25.562 BILATERAL CHRONIC KNEE PAIN: ICD-10-CM

## 2017-07-11 DIAGNOSIS — F17.210 NICOTINE DEPENDENCE, CIGARETTES, UNCOMPLICATED: ICD-10-CM

## 2017-07-11 DIAGNOSIS — M25.561 BILATERAL CHRONIC KNEE PAIN: ICD-10-CM

## 2017-07-11 PROCEDURE — 99215 OFFICE O/P EST HI 40 MIN: CPT | Performed by: NURSE PRACTITIONER

## 2017-07-11 PROCEDURE — 73564 X-RAY EXAM KNEE 4 OR MORE: CPT | Mod: TC,RT | Performed by: NURSE PRACTITIONER

## 2017-07-11 RX ORDER — MELOXICAM 15 MG/1
15 TABLET ORAL DAILY
Qty: 30 TAB | Refills: 1 | Status: SHIPPED | OUTPATIENT
Start: 2017-07-11 | End: 2017-12-19

## 2017-07-11 NOTE — ASSESSMENT & PLAN NOTE
Stopped smoking a few months ago, occasionally used e-cigarette without nicotine,  still smoking at home.  Resumed smoking a few cigarettes daily.  still smoking in the house. Trouble with stress and after meals.

## 2017-07-11 NOTE — ASSESSMENT & PLAN NOTE
Diagnosed when she was around 22. Previous hospital visit she was discharged on amlodipine 10 mg which she's been taking daily, recent hospital visit she was started on carvedilol 6.25 mg bid and daily aspirin.  Home readings done but she does not recall specific readings. There has been fluctuation in the readings.   Managed with amlodipine 10 mg daily, hasn't been taking carvedilol since shortly after discharge. No recent monitoring.

## 2017-07-11 NOTE — MR AVS SNAPSHOT
"        Sharmin Cardozo   2017 8:15 AM   Office Visit   MRN: [id number]    Department:  [facility]   Dept Phone:  [phone]    Description:  Female : 1965   Provider:  KE Negro           Reason for Visit     Knee Pain right knee swelling       Allergies as of 2017     Allergen Noted Reactions    Bactrim 2008   Rash    Iodine 10/17/2016   Nausea      You were diagnosed with     Essential hypertension   [6374204]       Nicotine dependence, cigarettes, uncomplicated   [5254085]       COPD with asthma (CMS-HCC)   [822468]       Right knee injury, initial encounter   [410665]       Fall from ground level   [1761425]       Elevated hemoglobin A1c   [521780]       Bilateral chronic knee pain   [590006]       Preventative health care   [977598]         Vital Signs     Blood Pressure Pulse Temperature Respirations Height Weight    126/80 mmHg 82 36.8 °C (98.2 °F) 16 1.549 m (5' 1\") 97.523 kg (215 lb)    Body Mass Index Oxygen Saturation Last Menstrual Period Smoking Status          40.64 kg/m2 98% 2008 Former Smoker        Basic Information     Date Of Birth Sex Race Ethnicity Preferred Language    1965 Female White Non- English      Your appointments     Aug 23, 2017  8:35 AM   Established Patient with KE Negro   [facility])    [address] [place] [address], [address]  [facility] [id number] [phone]           You will be receiving a confirmation call a few days before your appointment from our automated call confirmation system.              Problem List              ICD-10-CM Priority Class Noted - Resolved    COPD with exacerbation (CMS-HCC) J44.1   2016 - Present    Nicotine dependence, cigarettes, uncomplicated F17.210 Medium  2016 - Present    Leukocytosis D72.829 Low  2016 - Present    Essential hypertension I10   2017 - Present    Premature surgical menopause E89.40   2017 - " Present    Obesity (BMI 30-39.9) E66.9   1/4/2017 - Present    Chest pain, likely musculoskeletal R07.9 High  2/2/2017 - Present    COPD with asthma (CMS-HCC) J44.9, J45.909 Low  2/3/2017 - Present    Obesity, morbid, BMI 40.0-49.9 (CMS-HCC) E66.01   2/8/2017 - Present    Family history of coronary artery disease Z82.49   2/8/2017 - Present    Dyslipidemia E78.5   3/1/2017 - Present    Elevated hemoglobin A1c R73.09   3/1/2017 - Present    Bilateral chronic knee pain M25.561, M25.562, G89.29   3/1/2017 - Present      Health Maintenance        Date Due Completion Dates    IMM DTaP/Tdap/Td Vaccine (1 - Tdap) 8/5/1986 ---    IMM PNEUMOCOCCAL 19-64 (ADULT) MEDIUM RISK SERIES (1 of 1 - PPSV23) 8/5/1986 ---    MAMMOGRAM 8/5/2007 ---    IMM INFLUENZA (1) 9/1/2017 ---            Current Immunizations     No immunizations on file.      Below and/or attached are the medications your provider expects you to take. Review all of your home medications and newly ordered medications with your provider and/or pharmacist. Follow medication instructions as directed by your provider and/or pharmacist. Please keep your medication list with you and share with your provider. Update the information when medications are discontinued, doses are changed, or new medications (including over-the-counter products) are added; and carry medication information at all times in the event of emergency situations     Allergies:  BACTRIM - Rash     IODINE - Nausea               Medications  Valid as of: July 11, 2017 -  9:26 AM    Generic Name Brand Name Tablet Size Instructions for use    Acetaminophen-Codeine (Tab) TYLENOL #3 300-30 MG Take 1-2 Tabs by mouth every 6 hours as needed for Severe Pain. Do not drink alcohol, drive, or operate machinery while taking.        Albuterol Sulfate (Aero Soln) albuterol 108 (90 BASE) MCG/ACT Inhale 2 Puffs by mouth every 6 hours as needed for Shortness of Breath.        AmLODIPine Besylate (Tab) NORVASC 10 MG TAKE  ONE TABLET BY MOUTH ONCE DAILY        Aspirin (Tablet Delayed Response) ECOTRIN 81 MG Take 81 mg by mouth every day.        Atorvastatin Calcium (Tab) LIPITOR 20 MG         Bisacodyl (Tablet Delayed Response) DULCOLAX 5 MG Take 2 Tabs by mouth every day.        Budesonide-Formoterol Fumarate (Aerosol) SYMBICORT 160-4.5 MCG/ACT Inhale 2 Puffs by mouth 2 Times a Day.        Carvedilol (Tab) COREG 6.25 MG Take 6.25 mg by mouth 2 times a day, with meals.        Cyclobenzaprine HCl (Tab) FLEXERIL 5 MG Take 1-2 Tabs by mouth 3 times a day as needed for Moderate Pain or Muscle Spasms.        Ipratropium-Albuterol (Solution) DUONEB 0.5-2.5 (3) MG/3ML 3 mL by Nebulization route every four hours as needed for Shortness of Breath (bronchospasm).        Meloxicam (Tab) MOBIC 15 MG Take 1 Tab by mouth every day. Take with meal        Multiple Vitamin   Take 1 Tab by mouth every day.        Polyethylene Glycol 3350 (Powder) MIRALAX  Take 17 g by mouth at bedtime as needed (constipation).        Varenicline Tartrate (Misc) CHANTIX NATHALIE 0.5 MG X 11 & 1 MG X 42 Take as directed        .                 Medicines prescribed today were sent to:     [facility] [address] [place] [id number]    Phone: [phone] Fax: [phone]    Open 24 Hours?: No      Medication refill instructions:       If your prescription bottle indicates you have medication refills left, it is not necessary to call your provider’s office. Please contact your pharmacy and they will refill your medication.    If your prescription bottle indicates you do not have any refills left, you may request refills at any time through one of the following ways: The online [employer] system (except Urgent Care), by calling your provider’s office, or by asking your pharmacy to contact your provider’s office with a refill request. Medication refills are processed only during regular business hours and may not be  available until the next business day. Your provider may request additional information or to have a follow-up visit with you prior to refilling your medication.   *Please Note: Medication refills are assigned a new Rx number when refilled electronically. Your pharmacy may indicate that no refills were authorized even though a new prescription for the same medication is available at the pharmacy. Please request the medicine by name with the pharmacy before contacting your provider for a refill.        Your To Do List     Future Labs/Procedures Complete By Expires    DX-KNEE COMPLETE 4+ RIGHT  As directed 1/11/2018    HEMOGLOBIN A1C  As directed 7/12/2018    VITAMIN D,25 HYDROXY  As directed 7/12/2018         [employer] Access Code: Activation code not generated  Current S5 Tech Status: Active

## 2017-07-11 NOTE — PROGRESS NOTES
"Subjective:     Chief Complaint   Patient presents with   • Knee Pain     right knee swelling    • Chronic Care Management     Sharmin Cardozo is a 51 y.o. female here today for evaluation and management of issues listed below.    Right anterior knee pain, swelling around patella, some bruising healing. GLF 1 week ago. Has been wearing a brace for wlking, taking ibuprofen without much benefit. Ice and rest at home.  History of surgery in childhood. Also was told she had \"bone on bone\" no imaging of the right knee in epic.     Would like diabetes screening, thirsty, tired and feeling weak. History of \"borderline\" Diabetes   Essential hypertension  Diagnosed when she was around 22. Previous hospital visit she was discharged on amlodipine 10 mg which she's been taking daily, recent hospital visit she was started on carvedilol 6.25 mg bid and daily aspirin.  Home readings done but she does not recall specific readings. There has been fluctuation in the readings.   Managed with amlodipine 10 mg daily, hasn't been taking carvedilol since shortly after discharge. No recent monitoring.     Nicotine dependence, cigarettes, uncomplicated  Stopped smoking a few months ago, occasionally used e-cigarette without nicotine,  still smoking at home.  Resumed smoking a few cigarettes daily.  still smoking in the house. Trouble with stress and after meals.      COPD with asthma (CMS-HCC)  Not using inhalers .       1. Essential hypertension    2. Nicotine dependence, cigarettes, uncomplicated    3. COPD with asthma (CMS-[facility])    4. Right knee injury, initial encounter    5. Fall from ground level    6. Elevated hemoglobin A1c    7. Bilateral chronic knee pain    8. Preventative health care        Allergies: Bactrim and Iodine  Current medicines (including changes today)  Current Outpatient Prescriptions   Medication Sig Dispense Refill   • meloxicam (MOBIC) 15 MG tablet Take 1 Tab by mouth every day. Take with meal 30 " "Tab 1   • acetaminophen-codeine #3 (TYLENOL #3) 300-30 MG Tab Take 1-2 Tabs by mouth every 6 hours as needed for Severe Pain. Do not drink alcohol, drive, or operate machinery while taking. 20 Tab 1   • amlodipine (NORVASC) 10 MG Tab TAKE ONE TABLET BY MOUTH ONCE DAILY 90 Tab 1   • aspirin EC (ECOTRIN) 81 MG Tablet Delayed Response Take 81 mg by mouth every day.     • ipratropium-albuterol (DUONEB) 0.5-2.5 (3) MG/3ML nebulizer solution 3 mL by Nebulization route every four hours as needed for Shortness of Breath (bronchospasm). 75 mL 0   • budesonide-formoterol (SYMBICORT) 160-4.5 MCG/ACT Aerosol Inhale 2 Puffs by mouth 2 Times a Day. 2 Inhaler 0   • albuterol 108 (90 BASE) MCG/ACT Aero Soln inhalation aerosol Inhale 2 Puffs by mouth every 6 hours as needed for Shortness of Breath. 8.5 g 1   • Multiple Vitamin (DAILY VITAMIN PO) Take 1 Tab by mouth every day.     • polyethylene glycol 3350 (MIRALAX) Powder Take 17 g by mouth at bedtime as needed (constipation). 1 Bottle 0   • bisacodyl (DUCODYL) 5 MG EC tablet Take 2 Tabs by mouth every day. 30 Tab 0   • atorvastatin (LIPITOR) 20 MG Tab      • carvedilol (COREG) 6.25 MG Tab Take 6.25 mg by mouth 2 times a day, with meals.     • varenicline (CHANTIX STARTING MONTH PAK) 0.5 MG X 11 & 1 MG X 42 tablet Take as directed 56 Tab 3   • cyclobenzaprine (FLEXERIL) 5 MG tablet Take 1-2 Tabs by mouth 3 times a day as needed for Moderate Pain or Muscle Spasms. 30 Tab 0     No current facility-administered medications for this visit.       She  has a past medical history of Physiological ovarian cysts; ASTHMA; and Hypertension.    ROS   Denies HA, chest pain, shortness of breath, abdominal pain, bladder or bowel changes, lower extremity edema.     Objective:     Blood pressure 126/80, pulse 82, temperature 36.8 °C (98.2 °F), resp. rate 16, height 1.549 m (5' 1\"), weight 97.523 kg (215 lb), last menstrual period 04/17/2008, SpO2 98 %. Body mass index is 40.64 kg/(m^2).  Physical " Exam   Alert, no acute distress. Pleasant and cooperative with the examination.  Eye contact is good, affect calm.  Skin: Warm, dry, no rashes in visible areas.    Eyes: Pupils equal, round. Conjunctiva and sclera clear, lids normal.  ENT: Pinna normal.Neck: Supple, trachea midline.  Lungs: Normal effort and respirations. Clear to auscultation bilaterally.  CV: Regular rate and rhythm.  Abdomen: No CVAT  MS/Ext: full rom of both knees, . Knee: right  Appearance: No swelling or 2 small ecchymosis.  ROS: Extension to 0°  flexion to 150°  Palpation: unable to elicit tenderness to palpation today  Patellar apprehension test (patella dislocation):negative  Patellar compression test:negative  Drawer Sign (Cruciate Ligament)  Abnormal anterior mobility of tibia;Anterior Drawer (ACL)negative  Abnormal posterior mobility of the tibia; Posterior Drawer (PCL) negative  Collateral Ligament  Pain with valgus stress (MCL) :negative  Pain with varus stress (LCL):negative  Strength: Flexion 5-5 without pain, extension 5-5 without pain  Steady gait, no edema.    Results reviewed imaging labs    Assessment and Plan:   Sharmin was seen today for knee pain and chronic care management.    Diagnoses and all orders for this visit:    Essential hypertension    Nicotine dependence, cigarettes, uncomplicated    COPD with asthma (CMS-[facility])    Right knee injury, initial encounter  -     DX-KNEE COMPLETE 4+ RIGHT; Future  -     meloxicam (MOBIC) 15 MG tablet; Take 1 Tab by mouth every day. Take with meal  -     acetaminophen-codeine #3 (TYLENOL #3) 300-30 MG Tab; Take 1-2 Tabs by mouth every 6 hours as needed for Severe Pain. Do not drink alcohol, drive, or operate machinery while taking.    Fall from ground level  -     DX-KNEE COMPLETE 4+ RIGHT; Future  -     meloxicam (MOBIC) 15 MG tablet; Take 1 Tab by mouth every day. Take with meal  -     acetaminophen-codeine #3 (TYLENOL #3) 300-30 MG Tab; Take 1-2 Tabs by mouth every 6 hours as needed for  Severe Pain. Do not drink alcohol, drive, or operate machinery while taking.    Elevated hemoglobin A1c  -     HEMOGLOBIN A1C; Future    Bilateral chronic knee pain    Preventative health care  -     VITAMIN D,25 HYDROXY; Future        Treatment Changes: she'll continue current medications and visit lab. Imaging today, reviewed results with her:          Interpreted by me as negative for acute fx or dislocation.    Minimal degenerative changes.  Small knee joint effusion.       natural history, treatment options, supportive care, and indications for follow-up discussed at length. Rest, ice, elevation. Reviewed risks of NSAID's including GI, cardiovascular, and renal. Advised to use stool softener, reviewed risks of codeine.       Followup: Return in about 1 month (around 8/11/2017). sooner should new symptoms or problems arise, or as previously scheduled..       Patient was seen for 40 minutes, more than 50% of time spent in face to face review, consultation, counseling, and arranging future evaluation and follow up of medical conditions and care.     Reviewed side effects, risks, and benefits of medications prescribed today.  Advised to take all medications as instructed and report any side effects.   The patient voices understanding and agrees.  Report any new or worsening symptoms.  Have labs or other diagnostic studies prior to follow up.  Keep all appointments for any referrals given.    Please note this dictation was created using voice recognition software. Every reasonable attempt has been made to correct obvious errors, however there may be errors of grammar and possibly content that were not discovered before finalizing the note.

## 2017-07-13 ENCOUNTER — HOSPITAL ENCOUNTER (OUTPATIENT)
Dept: LAB | Facility: MEDICAL CENTER | Age: 52
End: 2017-07-13
Attending: NURSE PRACTITIONER
Payer: COMMERCIAL

## 2017-07-13 DIAGNOSIS — R73.09 ELEVATED HEMOGLOBIN A1C: ICD-10-CM

## 2017-07-13 DIAGNOSIS — Z00.00 PREVENTATIVE HEALTH CARE: ICD-10-CM

## 2017-07-13 LAB
25(OH)D3 SERPL-MCNC: 26 NG/ML (ref 30–100)
EST. AVERAGE GLUCOSE BLD GHB EST-MCNC: 114 MG/DL
HBA1C MFR BLD: 5.6 % (ref 0–5.6)

## 2017-07-13 PROCEDURE — 82306 VITAMIN D 25 HYDROXY: CPT

## 2017-07-13 PROCEDURE — 83036 HEMOGLOBIN GLYCOSYLATED A1C: CPT

## 2017-07-13 PROCEDURE — 36415 COLL VENOUS BLD VENIPUNCTURE: CPT

## 2017-09-05 ENCOUNTER — OFFICE VISIT (OUTPATIENT)
Dept: MEDICAL GROUP | Facility: PHYSICIAN GROUP | Age: 52
End: 2017-09-05
Payer: COMMERCIAL

## 2017-09-05 VITALS
DIASTOLIC BLOOD PRESSURE: 78 MMHG | BODY MASS INDEX: 40.4 KG/M2 | SYSTOLIC BLOOD PRESSURE: 122 MMHG | OXYGEN SATURATION: 98 % | WEIGHT: 214 LBS | HEIGHT: 61 IN | RESPIRATION RATE: 16 BRPM | TEMPERATURE: 98.2 F | HEART RATE: 78 BPM

## 2017-09-05 DIAGNOSIS — R11.2 NON-INTRACTABLE VOMITING WITH NAUSEA, UNSPECIFIED VOMITING TYPE: ICD-10-CM

## 2017-09-05 PROCEDURE — 99214 OFFICE O/P EST MOD 30 MIN: CPT | Performed by: NURSE PRACTITIONER

## 2017-09-05 RX ORDER — ONDANSETRON 4 MG/1
4 TABLET, FILM COATED ORAL EVERY 4 HOURS PRN
Qty: 20 TAB | Refills: 1 | Status: SHIPPED | OUTPATIENT
Start: 2017-09-05 | End: 2017-12-19

## 2017-09-05 NOTE — PROGRESS NOTES
Subjective:     Chief Complaint   Patient presents with   • Nausea     and vomiting, onset 3 days. Cant keep food or water down.  Last OV 7/11/7      Sharmin Cardozo is a 52 y.o. female here today for evaluation and management of issues listed below.    A little better now, keeping food and liquid down now but still having nausea. Sunday morning she felt a little nauseated upon waking. Went to work and started vomiting around 11:30-12 at work and went home. She called in sick Monday and is off today and tomorrow as scheduled. Hasn't tried any otc medications. Denies fever, chills, blood in vomitus or stool. No new medications, recent travel, sick contacts.   1. Non-intractable vomiting with nausea, unspecified vomiting type        Allergies: Bactrim and Iodine  Current medicines (including changes today)  Current Outpatient Prescriptions   Medication Sig Dispense Refill   • ondansetron (ZOFRAN) 4 MG Tab tablet Take 1 Tab by mouth every four hours as needed for Nausea/Vomiting. 20 Tab 1   • amlodipine (NORVASC) 10 MG Tab TAKE ONE TABLET BY MOUTH ONCE DAILY 90 Tab 1   • aspirin EC (ECOTRIN) 81 MG Tablet Delayed Response Take 81 mg by mouth every day.     • carvedilol (COREG) 6.25 MG Tab Take 6.25 mg by mouth 2 times a day, with meals.     • Multiple Vitamin (DAILY VITAMIN PO) Take 1 Tab by mouth every day.     • meloxicam (MOBIC) 15 MG tablet Take 1 Tab by mouth every day. Take with meal 30 Tab 1   • acetaminophen-codeine #3 (TYLENOL #3) 300-30 MG Tab Take 1-2 Tabs by mouth every 6 hours as needed for Severe Pain. Do not drink alcohol, drive, or operate machinery while taking. 20 Tab 1   • polyethylene glycol 3350 (MIRALAX) Powder Take 17 g by mouth at bedtime as needed (constipation). 1 Bottle 0   • bisacodyl (DUCODYL) 5 MG EC tablet Take 2 Tabs by mouth every day. 30 Tab 0   • atorvastatin (LIPITOR) 20 MG Tab      • varenicline (CHANTIX STARTING MONTH PAK) 0.5 MG X 11 & 1 MG X 42 tablet Take as directed 56 Tab 3  "  • cyclobenzaprine (FLEXERIL) 5 MG tablet Take 1-2 Tabs by mouth 3 times a day as needed for Moderate Pain or Muscle Spasms. 30 Tab 0   • ipratropium-albuterol (DUONEB) 0.5-2.5 (3) MG/3ML nebulizer solution 3 mL by Nebulization route every four hours as needed for Shortness of Breath (bronchospasm). 75 mL 0   • budesonide-formoterol (SYMBICORT) 160-4.5 MCG/ACT Aerosol Inhale 2 Puffs by mouth 2 Times a Day. 2 Inhaler 0   • albuterol 108 (90 BASE) MCG/ACT Aero Soln inhalation aerosol Inhale 2 Puffs by mouth every 6 hours as needed for Shortness of Breath. 8.5 g 1     No current facility-administered medications for this visit.        She  has a past medical history of ASTHMA; Hypertension; and Physiological ovarian cysts.    ROS   Denies HA, chest pain, shortness of breath, abdominal pain, bladder or bowel changes, lower extremity edema.    Health Maintenance: Reviewed and updated.      Objective:   Blood pressure 122/78, pulse 78, temperature 36.8 °C (98.2 °F), resp. rate 16, height 1.549 m (5' 1\"), weight 97.1 kg (214 lb), last menstrual period 04/17/2008, SpO2 98 %. Body mass index is 40.43 kg/m².  Physical Exam   Alert, no acute distress. Pleasant and cooperative with the examination.  Eye contact is good, affect calm.  Skin: Warm, dry, no rashes in visible areas.    Eyes: Pupils equal, round. Conjunctiva and sclera clear, lids normal.  ENT: Pinna normal. Oral mucous membranes pink and moist.  Neck: Supple, trachea midline.  Lungs: Normal effort and respirations. Clear to auscultation bilaterally. Abdomen: No CVAT  CV: Regular rate and rhythm.  MS/Ext: Steady gait, no edema.        Assessment and Plan:   Treatment Changes:   Sharmin was seen today for nausea.    Diagnoses and all orders for this visit:    Non-intractable vomiting with nausea, unspecified vomiting type  -     ondansetron (ZOFRAN) 4 MG Tab tablet; Take 1 Tab by mouth every four hours as needed for Nausea/Vomiting.    Differential diagnosis, natural " history, treatment options, supportive care, and indications for immediate follow-up discussed at length.     Followup: Return if symptoms worsen or fail to improve. Sooner should new symptoms or problems arise, or as previously scheduled.         Reviewed side effects, risks, and benefits of medications prescribed today.  Advised to take all medications as instructed and report any side effects.   The patient voices understanding and agrees.  Report any new or worsening symptoms.  Have labs or other diagnostic studies prior to follow up.  Keep all appointments for any referrals given.    Please note this dictation was created using voice recognition software. Every reasonable attempt has been made to correct obvious errors, however there may be errors of grammar and possibly content that were not discovered before finalizing the note.

## 2017-09-05 NOTE — LETTER
September 5, 2017         Patient: Sharmin Cardozo   YOB: 1965   Date of Visit: 9/5/2017           To Whom it May Concern:    Sharmin Cardozo was seen in my clinic on 9/5/2017. She may return to work on 9/07/2017    If you have any questions or concerns, please don't hesitate to call.        Sincerely,           ALEAH Negro.  Electronically Signed

## 2017-09-21 ENCOUNTER — HOSPITAL ENCOUNTER (EMERGENCY)
Facility: MEDICAL CENTER | Age: 52
End: 2017-09-21
Attending: EMERGENCY MEDICINE
Payer: COMMERCIAL

## 2017-09-21 ENCOUNTER — OFFICE VISIT (OUTPATIENT)
Dept: URGENT CARE | Facility: PHYSICIAN GROUP | Age: 52
End: 2017-09-21
Payer: COMMERCIAL

## 2017-09-21 ENCOUNTER — APPOINTMENT (OUTPATIENT)
Dept: RADIOLOGY | Facility: MEDICAL CENTER | Age: 52
End: 2017-09-21
Attending: EMERGENCY MEDICINE
Payer: COMMERCIAL

## 2017-09-21 VITALS
SYSTOLIC BLOOD PRESSURE: 165 MMHG | OXYGEN SATURATION: 95 % | HEART RATE: 60 BPM | BODY MASS INDEX: 40.32 KG/M2 | DIASTOLIC BLOOD PRESSURE: 78 MMHG | WEIGHT: 213.41 LBS | TEMPERATURE: 97 F | RESPIRATION RATE: 17 BRPM

## 2017-09-21 VITALS
HEIGHT: 61 IN | WEIGHT: 215 LBS | SYSTOLIC BLOOD PRESSURE: 132 MMHG | OXYGEN SATURATION: 97 % | HEART RATE: 74 BPM | DIASTOLIC BLOOD PRESSURE: 86 MMHG | BODY MASS INDEX: 40.59 KG/M2 | TEMPERATURE: 98 F

## 2017-09-21 DIAGNOSIS — R10.30 LOWER ABDOMINAL PAIN: ICD-10-CM

## 2017-09-21 DIAGNOSIS — R10.32 LLQ PAIN: ICD-10-CM

## 2017-09-21 DIAGNOSIS — K59.01 SLOW TRANSIT CONSTIPATION: ICD-10-CM

## 2017-09-21 LAB
ALBUMIN SERPL BCP-MCNC: 3.7 G/DL (ref 3.2–4.9)
ALBUMIN/GLOB SERPL: 1.4 G/DL
ALP SERPL-CCNC: 75 U/L (ref 30–99)
ALT SERPL-CCNC: 14 U/L (ref 2–50)
ANION GAP SERPL CALC-SCNC: 6 MMOL/L (ref 0–11.9)
APPEARANCE UR: CLEAR
AST SERPL-CCNC: 11 U/L (ref 12–45)
BASOPHILS # BLD AUTO: 0.3 % (ref 0–1.8)
BASOPHILS # BLD: 0.03 K/UL (ref 0–0.12)
BILIRUB SERPL-MCNC: 0.3 MG/DL (ref 0.1–1.5)
BILIRUB UR STRIP-MCNC: NEGATIVE MG/DL
BLOOD CULTURE HOLD CXBCH: NORMAL
BUN SERPL-MCNC: 17 MG/DL (ref 8–22)
CALCIUM SERPL-MCNC: 8.8 MG/DL (ref 8.5–10.5)
CHLORIDE SERPL-SCNC: 109 MMOL/L (ref 96–112)
CO2 SERPL-SCNC: 25 MMOL/L (ref 20–33)
COLOR UR AUTO: YELLOW
CREAT SERPL-MCNC: 0.52 MG/DL (ref 0.5–1.4)
EOSINOPHIL # BLD AUTO: 0.09 K/UL (ref 0–0.51)
EOSINOPHIL NFR BLD: 1 % (ref 0–6.9)
ERYTHROCYTE [DISTWIDTH] IN BLOOD BY AUTOMATED COUNT: 43.8 FL (ref 35.9–50)
GFR SERPL CREATININE-BSD FRML MDRD: >60 ML/MIN/1.73 M 2
GLOBULIN SER CALC-MCNC: 2.7 G/DL (ref 1.9–3.5)
GLUCOSE SERPL-MCNC: 90 MG/DL (ref 65–99)
GLUCOSE UR STRIP.AUTO-MCNC: NEGATIVE MG/DL
HCT VFR BLD AUTO: 45.8 % (ref 37–47)
HGB BLD-MCNC: 15.8 G/DL (ref 12–16)
IMM GRANULOCYTES # BLD AUTO: 0.09 K/UL (ref 0–0.11)
IMM GRANULOCYTES NFR BLD AUTO: 1 % (ref 0–0.9)
KETONES UR STRIP.AUTO-MCNC: NEGATIVE MG/DL
LEUKOCYTE ESTERASE UR QL STRIP.AUTO: NEGATIVE
LIPASE SERPL-CCNC: 10 U/L (ref 11–82)
LYMPHOCYTES # BLD AUTO: 3.11 K/UL (ref 1–4.8)
LYMPHOCYTES NFR BLD: 33 % (ref 22–41)
MCH RBC QN AUTO: 31.9 PG (ref 27–33)
MCHC RBC AUTO-ENTMCNC: 34.5 G/DL (ref 33.6–35)
MCV RBC AUTO: 92.5 FL (ref 81.4–97.8)
MONOCYTES # BLD AUTO: 0.38 K/UL (ref 0–0.85)
MONOCYTES NFR BLD AUTO: 4 % (ref 0–13.4)
NEUTROPHILS # BLD AUTO: 5.71 K/UL (ref 2–7.15)
NEUTROPHILS NFR BLD: 60.7 % (ref 44–72)
NITRITE UR QL STRIP.AUTO: NEGATIVE
NRBC # BLD AUTO: 0 K/UL
NRBC BLD AUTO-RTO: 0 /100 WBC
PH UR STRIP.AUTO: 5 [PH] (ref 5–8)
PLATELET # BLD AUTO: 235 K/UL (ref 164–446)
PMV BLD AUTO: 10.4 FL (ref 9–12.9)
POTASSIUM SERPL-SCNC: 3.9 MMOL/L (ref 3.6–5.5)
PROT SERPL-MCNC: 6.4 G/DL (ref 6–8.2)
PROT UR QL STRIP: NEGATIVE MG/DL
RBC # BLD AUTO: 4.95 M/UL (ref 4.2–5.4)
RBC UR QL AUTO: NEGATIVE
SODIUM SERPL-SCNC: 140 MMOL/L (ref 135–145)
SP GR UR STRIP.AUTO: 1.02
UROBILINOGEN UR STRIP-MCNC: NEGATIVE MG/DL
WBC # BLD AUTO: 9.4 K/UL (ref 4.8–10.8)

## 2017-09-21 PROCEDURE — 36415 COLL VENOUS BLD VENIPUNCTURE: CPT

## 2017-09-21 PROCEDURE — 700117 HCHG RX CONTRAST REV CODE 255: Performed by: EMERGENCY MEDICINE

## 2017-09-21 PROCEDURE — 700102 HCHG RX REV CODE 250 W/ 637 OVERRIDE(OP): Performed by: EMERGENCY MEDICINE

## 2017-09-21 PROCEDURE — 700111 HCHG RX REV CODE 636 W/ 250 OVERRIDE (IP): Performed by: EMERGENCY MEDICINE

## 2017-09-21 PROCEDURE — 96375 TX/PRO/DX INJ NEW DRUG ADDON: CPT

## 2017-09-21 PROCEDURE — 85025 COMPLETE CBC W/AUTO DIFF WBC: CPT

## 2017-09-21 PROCEDURE — 80053 COMPREHEN METABOLIC PANEL: CPT

## 2017-09-21 PROCEDURE — 96361 HYDRATE IV INFUSION ADD-ON: CPT

## 2017-09-21 PROCEDURE — 96374 THER/PROPH/DIAG INJ IV PUSH: CPT

## 2017-09-21 PROCEDURE — A9270 NON-COVERED ITEM OR SERVICE: HCPCS | Performed by: EMERGENCY MEDICINE

## 2017-09-21 PROCEDURE — 99285 EMERGENCY DEPT VISIT HI MDM: CPT

## 2017-09-21 PROCEDURE — 83690 ASSAY OF LIPASE: CPT

## 2017-09-21 PROCEDURE — 700105 HCHG RX REV CODE 258: Performed by: EMERGENCY MEDICINE

## 2017-09-21 PROCEDURE — 81002 URINALYSIS NONAUTO W/O SCOPE: CPT | Performed by: NURSE PRACTITIONER

## 2017-09-21 PROCEDURE — 99213 OFFICE O/P EST LOW 20 MIN: CPT | Performed by: NURSE PRACTITIONER

## 2017-09-21 PROCEDURE — 96376 TX/PRO/DX INJ SAME DRUG ADON: CPT

## 2017-09-21 PROCEDURE — 74177 CT ABD & PELVIS W/CONTRAST: CPT

## 2017-09-21 RX ORDER — METOCLOPRAMIDE 10 MG/1
10 TABLET ORAL 4 TIMES DAILY
Qty: 60 TAB | Refills: 0 | Status: SHIPPED | OUTPATIENT
Start: 2017-09-21 | End: 2017-12-19

## 2017-09-21 RX ORDER — SODIUM CHLORIDE 9 MG/ML
1000 INJECTION, SOLUTION INTRAVENOUS ONCE
Status: COMPLETED | OUTPATIENT
Start: 2017-09-21 | End: 2017-09-21

## 2017-09-21 RX ORDER — ONDANSETRON 2 MG/ML
4 INJECTION INTRAMUSCULAR; INTRAVENOUS ONCE
Status: COMPLETED | OUTPATIENT
Start: 2017-09-21 | End: 2017-09-21

## 2017-09-21 RX ORDER — ONDANSETRON 4 MG/1
4 TABLET, ORALLY DISINTEGRATING ORAL EVERY 8 HOURS PRN
Qty: 10 TAB | Refills: 0 | Status: SHIPPED | OUTPATIENT
Start: 2017-09-21 | End: 2017-12-19

## 2017-09-21 RX ADMIN — SODIUM CHLORIDE 1000 ML: 9 INJECTION, SOLUTION INTRAVENOUS at 10:21

## 2017-09-21 RX ADMIN — ONDANSETRON 4 MG: 2 INJECTION INTRAMUSCULAR; INTRAVENOUS at 14:06

## 2017-09-21 RX ADMIN — MAGNESIUM CITRATE 296 ML: 1.75 LIQUID ORAL at 14:06

## 2017-09-21 RX ADMIN — HYDROMORPHONE HYDROCHLORIDE 1 MG: 1 INJECTION, SOLUTION INTRAMUSCULAR; INTRAVENOUS; SUBCUTANEOUS at 10:21

## 2017-09-21 RX ADMIN — IOHEXOL 100 ML: 350 INJECTION, SOLUTION INTRAVENOUS at 13:34

## 2017-09-21 RX ADMIN — HYDROMORPHONE HYDROCHLORIDE 1 MG: 1 INJECTION, SOLUTION INTRAMUSCULAR; INTRAVENOUS; SUBCUTANEOUS at 13:31

## 2017-09-21 RX ADMIN — ONDANSETRON 4 MG: 2 INJECTION INTRAMUSCULAR; INTRAVENOUS at 10:21

## 2017-09-21 ASSESSMENT — ENCOUNTER SYMPTOMS
BACK PAIN: 1
DIZZINESS: 1
HEADACHES: 0
NAUSEA: 1
FLANK PAIN: 0
DIARRHEA: 1
PALPITATIONS: 0
ORTHOPNEA: 0
BLOOD IN STOOL: 0
VOMITING: 0
MYALGIAS: 1
CONSTIPATION: 0
FEVER: 0
WEAKNESS: 0
ABDOMINAL PAIN: 1
CHILLS: 0
SHORTNESS OF BREATH: 0

## 2017-09-21 ASSESSMENT — LIFESTYLE VARIABLES: DO YOU DRINK ALCOHOL: NO

## 2017-09-21 ASSESSMENT — PAIN SCALES - GENERAL: PAINLEVEL_OUTOF10: 6

## 2017-09-21 NOTE — ED PROVIDER NOTES
ED Provider Note    CHIEF COMPLAINT  Chief Complaint   Patient presents with   • LLQ Pain       HPI  Sharmin Cardozo is a 52 y.o. female who presentsFor evaluation of 2-3 days of crampy left lower abdominal pain, green diarrhea. The patient has a history of colon infections in the past. She denies any antibiotic use within the last 2 months. Denies any hematochezia nausea vomiting but does report green diarrhea. Pain is 5 out of 10 dull aching primarily left lower quadrant. It does radiate up to the left upper quadrant left flank however. No history of kidney stones. No high fevers or chills.     REVIEW OF SYSTEMS  See HPI for further details. No night sweats weight loss numbness tingling weakness rash headache All other systems are negative.     PAST MEDICAL HISTORY  Past Medical History:   Diagnosis Date   • ASTHMA    • Hypertension    • Physiological ovarian cysts        FAMILY HISTORY  History of bleeding disorder    SOCIAL HISTORY  Social History     Social History   • Marital status:      Spouse name: N/A   • Number of children: 4   • Years of education: N/A     Occupational History   •  [employer] [place] [id number]     Social History Main Topics   • Smoking status: Current Every Day Smoker     Packs/day: 0.25     Types: Cigarettes     Last attempt to quit: 2/2/2017   • Smokeless tobacco: Never Used      Comment: Vape with no nicotine    • Alcohol use No      Comment: 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, 2014 ( had DUI and [facility])   • Drug use: No   • Sexual activity: Yes     Partners: Male     Other Topics Concern   • Not on file     Social History Narrative   • No narrative on file     Denies IV drugs  SURGICAL HISTORY  Past Surgical History:   Procedure Laterality Date   • CYSTOSCOPY  3/24/2009    Per[facility]   • VAGINAL HYSTERECTOMY SCOPE TOTAL  3/24/2009    Per[facility] BRIGGS [facility]   • CHOLECYSTECTOMY     • OTHER       left elbow   • PB VAG DELIV ONLY,PBEV C-SECTN      x 4   • TUBAL LIGATION         CURRENT MEDICATIONS    Current Facility-Administered Medications:   •  HYDROmorphone (DILAUDID) injection 1 mg, 1 mg, Intravenous, Q HOUR PRN, Javier Gonzalez M.D., 1 mg at 09/21/17 1021    Current Outpatient Prescriptions:   •  ondansetron (ZOFRAN) 4 MG Tab tablet, Take 1 Tab by mouth every four hours as needed for Nausea/Vomiting., Disp: 20 Tab, Rfl: 1  •  meloxicam (MOBIC) 15 MG tablet, Take 1 Tab by mouth every day. Take with meal, Disp: 30 Tab, Rfl: 1  •  acetaminophen-codeine #3 (TYLENOL #3) 300-30 MG Tab, Take 1-2 Tabs by mouth every 6 hours as needed for Severe Pain. Do not drink alcohol, drive, or operate machinery while taking., Disp: 20 Tab, Rfl: 1  •  polyethylene glycol 3350 (MIRALAX) Powder, Take 17 g by mouth at bedtime as needed (constipation)., Disp: 1 Bottle, Rfl: 0  •  bisacodyl (DUCODYL) 5 MG EC tablet, Take 2 Tabs by mouth every day., Disp: 30 Tab, Rfl: 0  •  amlodipine (NORVASC) 10 MG Tab, TAKE ONE TABLET BY MOUTH ONCE DAILY, Disp: 90 Tab, Rfl: 1  •  atorvastatin (LIPITOR) 20 MG Tab, , Disp: , Rfl:   •  aspirin EC (ECOTRIN) 81 MG Tablet Delayed Response, Take 81 mg by mouth every day., Disp: , Rfl:   •  carvedilol (COREG) 6.25 MG Tab, Take 6.25 mg by mouth 2 times a day, with meals., Disp: , Rfl:   •  varenicline (CHANTIX STARTING MONTH NATHALIE) 0.5 MG X 11 & 1 MG X 42 tablet, Take as directed, Disp: 56 Tab, Rfl: 3  •  cyclobenzaprine (FLEXERIL) 5 MG tablet, Take 1-2 Tabs by mouth 3 times a day as needed for Moderate Pain or Muscle Spasms., Disp: 30 Tab, Rfl: 0  •  ipratropium-albuterol (DUONEB) 0.5-2.5 (3) MG/3ML nebulizer solution, 3 mL by Nebulization route every four hours as needed for Shortness of Breath (bronchospasm)., Disp: 75 mL, Rfl: 0  •  budesonide-formoterol (SYMBICORT) 160-4.5 MCG/ACT Aerosol, Inhale 2 Puffs by mouth 2 Times a Day., Disp: 2 Inhaler, Rfl: 0  •  albuterol 108 (90 BASE) MCG/ACT Aero Soln  inhalation aerosol, Inhale 2 Puffs by mouth every 6 hours as needed for Shortness of Breath., Disp: 8.5 g, Rfl: 1  •  Multiple Vitamin (DAILY VITAMIN PO), Take 1 Tab by mouth every day., Disp: , Rfl:       ALLERGIES  Allergies   Allergen Reactions   • Bactrim Rash   • Iodine Nausea       PHYSICAL EXAM  VITAL SIGNS: /106   Pulse 100   Temp 36.1 °C (96.9 °F)   Resp (!) 117   Wt 96.8 kg (213 lb 6.5 oz)   LMP 04/17/2008   SpO2 97%   BMI 40.32 kg/m²  Room air O2: 97    Constitutional:Patient appears uncomfortable  HENT: Normocephalic, Atraumatic, Bilateral external ears normal,Dry mucous membranes, No oral exudates, Nose normal.   Eyes: PERRLA, EOMI, Conjunctiva normal, No discharge.   Neck: Normal range of motion, No tenderness, Supple, No stridor.   Lymphatic: No lymphadenopathy noted.   Cardiovascular: Mild tachycardia Normal rhythm, No murmurs, No rubs, No gallops.   Thorax & Lungs: Normal breath sounds, No respiratory distress, No wheezing, No chest tenderness.   Abdomen: Bowel sounds normal, Soft, reproducible left lower quadrant tenderness no hernias or masses patient has tenderness but no evidence of peritonitis.   Skin: Warm, Dry, No erythema, No rash.   Back: No tenderness, No CVA tenderness.   Extremities: Intact distal pulses, No edema, No tenderness, No cyanosis, No clubbing.     Neurologic: Alert & oriented x 3, Normal motor function, Normal sensory function, No focal deficits noted.   Psychiatric: Affect normal, Judgment normal, Mood normal.   CT-ABDOMEN-PELVIS WITH   Final Result      No acute intra-abdominal abnormality is seen.      Status post cholecystectomy.      Moderate amount of stool in the cecum and ascending colon.      5 mm right lower lobe pulmonary nodule is stable.      Hypodense hepatic lesion is unchanged and likely represents a cyst.        Results for orders placed or performed during the hospital encounter of 09/21/17   CBC WITH DIFFERENTIAL   Result Value Ref Range    WBC  9.4 4.8 - 10.8 K/uL    RBC 4.95 4.20 - 5.40 M/uL    Hemoglobin 15.8 12.0 - 16.0 g/dL    Hematocrit 45.8 37.0 - 47.0 %    MCV 92.5 81.4 - 97.8 fL    MCH 31.9 27.0 - 33.0 pg    MCHC 34.5 33.6 - 35.0 g/dL    RDW 43.8 35.9 - 50.0 fL    Platelet Count 235 164 - 446 K/uL    MPV 10.4 9.0 - 12.9 fL    Neutrophils-Polys 60.70 44.00 - 72.00 %    Lymphocytes 33.00 22.00 - 41.00 %    Monocytes 4.00 0.00 - 13.40 %    Eosinophils 1.00 0.00 - 6.90 %    Basophils 0.30 0.00 - 1.80 %    Immature Granulocytes 1.00 (H) 0.00 - 0.90 %    Nucleated RBC 0.00 /100 WBC    Neutrophils (Absolute) 5.71 2.00 - 7.15 K/uL    Lymphs (Absolute) 3.11 1.00 - 4.80 K/uL    Monos (Absolute) 0.38 0.00 - 0.85 K/uL    Eos (Absolute) 0.09 0.00 - 0.51 K/uL    Baso (Absolute) 0.03 0.00 - 0.12 K/uL    Immature Granulocytes (abs) 0.09 0.00 - 0.11 K/uL    NRBC (Absolute) 0.00 K/uL   BLOOD CULTURE,HOLD   Result Value Ref Range    Blood Culture Hold Collected    COMP METABOLIC PANEL   Result Value Ref Range    Sodium 140 135 - 145 mmol/L    Potassium 3.9 3.6 - 5.5 mmol/L    Chloride 109 96 - 112 mmol/L    Co2 25 20 - 33 mmol/L    Anion Gap 6.0 0.0 - 11.9    Glucose 90 65 - 99 mg/dL    Bun 17 8 - 22 mg/dL    Creatinine 0.52 0.50 - 1.40 mg/dL    Calcium 8.8 8.5 - 10.5 mg/dL    AST(SGOT) 11 (L) 12 - 45 U/L    ALT(SGPT) 14 2 - 50 U/L    Alkaline Phosphatase 75 30 - 99 U/L    Total Bilirubin 0.3 0.1 - 1.5 mg/dL    Albumin 3.7 3.2 - 4.9 g/dL    Total Protein 6.4 6.0 - 8.2 g/dL    Globulin 2.7 1.9 - 3.5 g/dL    A-G Ratio 1.4 g/dL   LIPASE   Result Value Ref Range    Lipase 10 (L) 11 - 82 U/L   ESTIMATED GFR   Result Value Ref Range    GFR If African American >60 >60 mL/min/1.73 m 2    GFR If Non African American >60 >60 mL/min/1.73 m 2        COURSE & MEDICAL DECISION MAKING  Pertinent Labs & Imaging studies reviewed. (See chart for details)  An IV established. The patient given IV fluids nausea and pain medication. Differential diagnosis includes diverticulitis kidney  stone colitis constipation. Laboratory studies are unremarkable. CT scan demonstrates no acute inflammatory or obstructive process other than constipation. This clinically fits. The patient did not have evidence of peritonitis. I'll discharge her home on magnesium citrate as well as Reglan and Zofran. I advised her to return here in one to 2 days if symptoms progress or worsen    FINAL IMPRESSION  1.  1. Lower abdominal pain    2. Slow transit constipation             Electronically signed by: Javier Gonzalez, 9/21/2017 10:00 AM

## 2017-09-21 NOTE — PROGRESS NOTES
Subjective:      Sharmin Cardozo is a 52 y.o. female who presents with LLQ Pain (Nausea, lower back pain, fatigue, soft green stool x 3 days )            HPI  Sharmin is a 52 year old female who is here for LLQ pain x 3 days. States has had pain with nausea without vomiting. Denies problems with colon but has had abdominal pain before in her right side. Was sent to ER 4 months ago for this. Dx: with constipation and 5mm right lower lobe pulmonary nodule on CT. Smoker. States she does not take Miralax as she denies constipation today. States stool is yellow/green and soft. C/o left side back pain. Saw PCP 2 weeks ago for n/v, rx: zofran, not taking this. Has missed 2 days of work. States had heartburn x 2 days, denies CP, heart palpitations, SOB. States takes BP meds and vitamins.    PMH:  has a past medical history of ASTHMA; Hypertension; and Physiological ovarian cysts.  MEDS:   Current Outpatient Prescriptions:   •  ondansetron (ZOFRAN) 4 MG Tab tablet, Take 1 Tab by mouth every four hours as needed for Nausea/Vomiting., Disp: 20 Tab, Rfl: 1  •  meloxicam (MOBIC) 15 MG tablet, Take 1 Tab by mouth every day. Take with meal, Disp: 30 Tab, Rfl: 1  •  acetaminophen-codeine #3 (TYLENOL #3) 300-30 MG Tab, Take 1-2 Tabs by mouth every 6 hours as needed for Severe Pain. Do not drink alcohol, drive, or operate machinery while taking., Disp: 20 Tab, Rfl: 1  •  polyethylene glycol 3350 (MIRALAX) Powder, Take 17 g by mouth at bedtime as needed (constipation)., Disp: 1 Bottle, Rfl: 0  •  bisacodyl (DUCODYL) 5 MG EC tablet, Take 2 Tabs by mouth every day., Disp: 30 Tab, Rfl: 0  •  amlodipine (NORVASC) 10 MG Tab, TAKE ONE TABLET BY MOUTH ONCE DAILY, Disp: 90 Tab, Rfl: 1  •  atorvastatin (LIPITOR) 20 MG Tab, , Disp: , Rfl:   •  aspirin EC (ECOTRIN) 81 MG Tablet Delayed Response, Take 81 mg by mouth every day., Disp: , Rfl:   •  carvedilol (COREG) 6.25 MG Tab, Take 6.25 mg by mouth 2 times a day, with meals., Disp: , Rfl:   •   varenicline (CHANTIX STARTING MONTH PAK) 0.5 MG X 11 & 1 MG X 42 tablet, Take as directed, Disp: 56 Tab, Rfl: 3  •  cyclobenzaprine (FLEXERIL) 5 MG tablet, Take 1-2 Tabs by mouth 3 times a day as needed for Moderate Pain or Muscle Spasms., Disp: 30 Tab, Rfl: 0  •  ipratropium-albuterol (DUONEB) 0.5-2.5 (3) MG/3ML nebulizer solution, 3 mL by Nebulization route every four hours as needed for Shortness of Breath (bronchospasm)., Disp: 75 mL, Rfl: 0  •  budesonide-formoterol (SYMBICORT) 160-4.5 MCG/ACT Aerosol, Inhale 2 Puffs by mouth 2 Times a Day., Disp: 2 Inhaler, Rfl: 0  •  albuterol 108 (90 BASE) MCG/ACT Aero Soln inhalation aerosol, Inhale 2 Puffs by mouth every 6 hours as needed for Shortness of Breath., Disp: 8.5 g, Rfl: 1  •  Multiple Vitamin (DAILY VITAMIN PO), Take 1 Tab by mouth every day., Disp: , Rfl:   ALLERGIES:   Allergies   Allergen Reactions   • Bactrim Rash   • Iodine Nausea     SURGHX:   Past Surgical History:   Procedure Laterality Date   • CYSTOSCOPY  3/24/2009    Performed by ZORAN BRIGGS at SURGERY SAME DAY [facility]   • VAGINAL HYSTERECTOMY SCOPE TOTAL  3/24/2009    Performed by ZORAN BRIGGS at SURGERY SAME DAY [facility]   • CHOLECYSTECTOMY     • OTHER      left elbow   • PB VAG DELIV ONLY,PBEV C-SECTN      x 4   • TUBAL LIGATION       SOCHX:  reports that she has been smoking Cigarettes.  She has been smoking about 0.25 packs per day. She has never used smokeless tobacco. She reports that she does not drink alcohol or use drugs.  FH: Family history was reviewed, no pertinent findings to report    Review of Systems   Constitutional: Negative for chills, fever and malaise/fatigue.   Respiratory: Negative for shortness of breath.    Cardiovascular: Negative for chest pain, palpitations and orthopnea.   Gastrointestinal: Positive for abdominal pain, diarrhea and nausea. Negative for blood in stool, constipation and vomiting.   Genitourinary: Negative for dysuria, flank pain and  "hematuria.   Musculoskeletal: Positive for back pain and myalgias.   Neurological: Positive for dizziness. Negative for weakness and headaches.   All other systems reviewed and are negative.         Objective:     /86   Pulse 74   Temp 36.7 °C (98 °F)   Ht 1.549 m (5' 1\")   Wt 97.5 kg (215 lb)   LMP 04/17/2008   SpO2 97%   BMI 40.62 kg/m²      Physical Exam   Constitutional: She is oriented to person, place, and time. Vital signs are normal. She appears well-developed and well-nourished. She is active and cooperative.  Non-toxic appearance. She does not have a sickly appearance. She appears ill. No distress.   HENT:   Head: Normocephalic.   Eyes: Conjunctivae and EOM are normal. Pupils are equal, round, and reactive to light.   Cardiovascular: Normal rate and regular rhythm.    Pulmonary/Chest: Effort normal and breath sounds normal.   Abdominal: Soft. Bowel sounds are normal. She exhibits distension. There is tenderness in the left lower quadrant. There is no rebound and no guarding.       Patient has wide abdominal girth and unable to palpate deeply due to tenderness at mid to left side abdominal pain   Musculoskeletal: Normal range of motion.   Neurological: She is alert and oriented to person, place, and time.   Skin: Skin is warm and dry. She is not diaphoretic.   Vitals reviewed.    POC Color Yellow Negative Final   POC Appearance Clear Negative Final   POC Leukocyte Esterase Negative Negative Final   POC Nitrites Negative Negative Final   POC Urobiligen Negative Negative (0.2) mg/dL Final   POC Protein Negative Negative mg/dL Final   POC Urine PH 5.0 5.0 - 8.0 Final   POC Blood Negative Negative Final   POC Specific Gravity 1.020 <1.005 - >1.030 Final   POC Ketones Negative Negative mg/dL Final   POC Biliruben Negative Negative mg/dL Final   POC Glucose Negative Negative mg/dL Final               Assessment/Plan:     1. LLQ pain    - REFERRAL TO GASTROENTEROLOGY (due to recurring abdominal pain x " 5 months as follow up after ER)    Refer to ER due to severe LLQ pain with tenderness, nausea, heartburn, back pain  Patient stable vitals and presentation to go POV

## 2017-09-21 NOTE — DISCHARGE INSTRUCTIONS
Abdominal Pain (Nonspecific)  Your exam might not show the exact reason you have abdominal pain. Since there are many different causes of abdominal pain, another checkup and more tests may be needed. It is very important to follow up for lasting (persistent) or worsening symptoms. A possible cause of abdominal pain in any person who still has his or her appendix is acute appendicitis. Appendicitis is often hard to diagnose. Normal blood tests, urine tests, ultrasound, and CT scans do not completely rule out early appendicitis or other causes of abdominal pain. Sometimes, only the changes that happen over time will allow appendicitis and other causes of abdominal pain to be determined. Other potential problems that may require surgery may also take time to become more apparent. Because of this, it is important that you follow all of the instructions below.  HOME CARE INSTRUCTIONS   · Rest as much as possible.   · Do not eat solid food until your pain is gone.   · While adults or children have pain: A diet of water, weak decaffeinated tea, broth or bouillon, gelatin, oral rehydration solutions (ORS), frozen ice pops, or ice chips may be helpful.   · When pain is gone in adults or children: Start a light diet (dry toast, crackers, applesauce, or white rice). Increase the diet slowly as long as it does not bother you. Eat no dairy products (including cheese and eggs) and no spicy, fatty, fried, or high-fiber foods.   · Use no alcohol, caffeine, or cigarettes.   · Take your regular medicines unless your caregiver told you not to.   · Take any prescribed medicine as directed.   · Only take over-the-counter or prescription medicines for pain, discomfort, or fever as directed by your caregiver. Do not give aspirin to children.   If your caregiver has given you a follow-up appointment, it is very important to keep that appointment. Not keeping the appointment could result in a permanent injury and/or lasting (chronic) pain  and/or disability. If there is any problem keeping the appointment, you must call to reschedule.   SEEK IMMEDIATE MEDICAL CARE IF:   · Your pain is not gone in 24 hours.   · Your pain becomes worse, changes location, or feels different.   · You or your child has an oral temperature above 102° F (38.9° C), not controlled by medicine.   · Your baby is older than 3 months with a rectal temperature of 102° F (38.9° C) or higher.   · Your baby is 3 months old or younger with a rectal temperature of 100.4° F (38° C) or higher.   · You have shaking chills.   · You keep throwing up (vomiting) or cannot drink liquids.   · There is blood in your vomit or you see blood in your bowel movements.   · Your bowel movements become dark or black.   · You have frequent bowel movements.   · Your bowel movements stop (become blocked) or you cannot pass gas.   · You have bloody, frequent, or painful urination.   · You have yellow discoloration in the skin or whites of the eyes.   · Your stomach becomes bloated or bigger.   · You have dizziness or fainting.   · You have chest or back pain.   MAKE SURE YOU:   · Understand these instructions.   · Will watch your condition.   · Will get help right away if you are not doing well or get worse.   Document Released: 12/18/2006 Document Revised: 03/11/2013 Document Reviewed: 11/15/2010  [employer]® Patient Information ©2013 [employer].  Constipation, Adult  Constipation is when a person has fewer than three bowel movements a week, has difficulty having a bowel movement, or has stools that are dry, hard, or larger than normal. As people grow older, constipation is more common. A low-fiber diet, not taking in enough fluids, and taking certain medicines may make constipation worse.   CAUSES   · Certain medicines, such as antidepressants, pain medicine, iron supplements, antacids, and water pills.    · Certain diseases, such as diabetes, irritable bowel syndrome (IBS), thyroid disease, or  depression.    · Not drinking enough water.    · Not eating enough fiber-rich foods.    · Stress or travel.    · Lack of physical activity or exercise.    · Ignoring the urge to have a bowel movement.    · Using laxatives too much.    SIGNS AND SYMPTOMS   · Having fewer than three bowel movements a week.    · Straining to have a bowel movement.    · Having stools that are hard, dry, or larger than normal.    · Feeling full or bloated.    · Pain in the lower abdomen.    · Not feeling relief after having a bowel movement.    DIAGNOSIS   Your health care provider will take a medical history and perform a physical exam. Further testing may be done for severe constipation. Some tests may include:  · A barium enema X-ray to examine your rectum, colon, and, sometimes, your small intestine.    · A sigmoidoscopy to examine your lower colon.    · A colonoscopy to examine your entire colon.  TREATMENT   Treatment will depend on the severity of your constipation and what is causing it. Some dietary treatments include drinking more fluids and eating more fiber-rich foods. Lifestyle treatments may include regular exercise. If these diet and lifestyle recommendations do not help, your health care provider may recommend taking over-the-counter laxative medicines to help you have bowel movements. Prescription medicines may be prescribed if over-the-counter medicines do not work.   HOME CARE INSTRUCTIONS   · Eat foods that have a lot of fiber, such as fruits, vegetables, whole grains, and beans.  · Limit foods high in fat and processed sugars, such as french fries, hamburgers, cookies, candies, and soda.    · A fiber supplement may be added to your diet if you cannot get enough fiber from foods.    · Drink enough fluids to keep your urine clear or pale yellow.    · Exercise regularly or as directed by your health care provider.    · Go to the restroom when you have the urge to go. Do not hold it.    · Only take over-the-counter or  prescription medicines as directed by your health care provider. Do not take other medicines for constipation without talking to your health care provider first.    SEEK IMMEDIATE MEDICAL CARE IF:   · You have bright red blood in your stool.    · Your constipation lasts for more than 4 days or gets worse.    · You have abdominal or rectal pain.    · You have thin, pencil-like stools.    · You have unexplained weight loss.  MAKE SURE YOU:   · Understand these instructions.  · Will watch your condition.  · Will get help right away if you are not doing well or get worse.     This information is not intended to replace advice given to you by your health care provider. Make sure you discuss any questions you have with your health care provider.     Document Released: 09/15/2005 Document Revised: 01/08/2016 Document Reviewed: 09/29/2014  [employer]vier Interactive Patient Education ©2016 [employer]

## 2017-09-21 NOTE — ED NOTES
"Pt to triage after being sent by urgent care for eval of LLQ pain x 3 days. (+) N/V/D.  Stool is green in color. LLQ tender to touch. Pt reports when she had similar symptoms in the past it was due to a \"colon infection\".   Pt advised to return to triage nurse for any changes or concerns.   "

## 2017-09-21 NOTE — ED NOTES
Discharge instructions given.  All questions answered.  Prescriptions given x2.  Pt to follow-up with PCP, return if symptoms worsen.  Pt verbalized understanding.  All belongings with pt.  Pt ambulated with steady gait to lobby.

## 2017-09-21 NOTE — ED NOTES
Pt to CT via [facility].  Pt reports wanting to await to have pain medication until after back from CT.

## 2017-09-22 ENCOUNTER — PATIENT OUTREACH (OUTPATIENT)
Dept: HEALTH INFORMATION MANAGEMENT | Facility: OTHER | Age: 52
End: 2017-09-22

## 2017-10-16 ENCOUNTER — HOSPITAL ENCOUNTER (EMERGENCY)
Facility: MEDICAL CENTER | Age: 52
End: 2017-10-16
Attending: EMERGENCY MEDICINE
Payer: COMMERCIAL

## 2017-10-16 VITALS
HEIGHT: 62 IN | TEMPERATURE: 98.7 F | DIASTOLIC BLOOD PRESSURE: 79 MMHG | RESPIRATION RATE: 17 BRPM | BODY MASS INDEX: 39.64 KG/M2 | OXYGEN SATURATION: 98 % | SYSTOLIC BLOOD PRESSURE: 143 MMHG | HEART RATE: 89 BPM | WEIGHT: 215.39 LBS

## 2017-10-16 DIAGNOSIS — S61.411A LACERATION OF RIGHT HAND, FOREIGN BODY PRESENCE UNSPECIFIED, INITIAL ENCOUNTER: ICD-10-CM

## 2017-10-16 PROCEDURE — 700111 HCHG RX REV CODE 636 W/ 250 OVERRIDE (IP): Performed by: EMERGENCY MEDICINE

## 2017-10-16 PROCEDURE — 90715 TDAP VACCINE 7 YRS/> IM: CPT | Performed by: EMERGENCY MEDICINE

## 2017-10-16 PROCEDURE — 99283 EMERGENCY DEPT VISIT LOW MDM: CPT

## 2017-10-16 PROCEDURE — 303747 HCHG EXTRA SUTURE

## 2017-10-16 PROCEDURE — 304217 HCHG IRRIGATION SYSTEM

## 2017-10-16 PROCEDURE — 304999 HCHG REPAIR-SIMPLE/INTERMED LEVEL 1

## 2017-10-16 PROCEDURE — 90471 IMMUNIZATION ADMIN: CPT

## 2017-10-16 PROCEDURE — 700101 HCHG RX REV CODE 250

## 2017-10-16 RX ORDER — LIDOCAINE HYDROCHLORIDE 10 MG/ML
INJECTION, SOLUTION INFILTRATION; PERINEURAL
Status: COMPLETED
Start: 2017-10-16 | End: 2017-10-16

## 2017-10-16 RX ADMIN — CLOSTRIDIUM TETANI TOXOID ANTIGEN (FORMALDEHYDE INACTIVATED), CORYNEBACTERIUM DIPHTHERIAE TOXOID ANTIGEN (FORMALDEHYDE INACTIVATED), BORDETELLA PERTUSSIS TOXOID ANTIGEN (GLUTARALDEHYDE INACTIVATED), BORDETELLA PERTUSSIS FILAMENTOUS HEMAGGLUTININ ANTIGEN (FORMALDEHYDE INACTIVATED), BORDETELLA PERTUSSIS PERTACTIN ANTIGEN, AND BORDETELLA PERTUSSIS FIMBRIAE 2/3 ANTIGEN 0.5 ML: 5; 2; 2.5; 5; 3; 5 INJECTION, SUSPENSION INTRAMUSCULAR at 19:54

## 2017-10-16 RX ADMIN — LIDOCAINE HYDROCHLORIDE: 10 INJECTION, SOLUTION INFILTRATION; PERINEURAL at 19:15

## 2017-10-16 ASSESSMENT — LIFESTYLE VARIABLES: DO YOU DRINK ALCOHOL: NO

## 2017-10-16 NOTE — LETTER
"  FORM C-4:  EMPLOYEE’S CLAIM FOR COMPENSATION/ REPORT OF INITIAL TREATMENT  EMPLOYEE’S CLAIM - PROVIDE ALL INFORMATION REQUESTED   First Name  Sharmin Last Name  Juliane Birthdate             Age  1965 52 y.o. Sex  female Claim Number   Home Employee Address  [address]                                     Zip  [address] Height  1.575 m (5' 2\") Weight  97.7 kg (215 lb 6.2 oz) [facility]     Mailing Employee Address                           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 (if applicable)  Pulling in New Tires    How did this injury or occupational disease occur? Be specific and answer in detail. Use additional sheet if necessary)  pulling in tires and nit hand on lube baygauge meatle was sticking out and cut hand   If you believe that you have an occupational disease, when did you first have knowledge of the disability and it relationship to your employment?  NA Witnesses to the Accident  NA     Nature of Injury or Occupational Disease  Laceration  Part(s) of Body Injured or Affected  Finger (R), N/A, N/A    I certify that the above is true and correct to the best of my knowledge and that I have provided this information in order to " obtain the benefits of [place] inclusive or Chapter 617 of [place]RS).  I hereby authorize any physician, chiropractor, surgeon, practitioner, or other person, any hospital, including [facility], any medical service organization, any insurance company, or other institution or organization to release to each other, any medical or other information, including benefits paid or payable, pertinent to this injury or disease, except information relative to diagnosis, treatment and/or counseling for AIDS, psychological conditions, alcohol or controlled substances, for which I must give specific authorization.  A Photostat of this authorization shall be as valid as the original.   Date  10/16/2017 Place  [facility] Employee’s Signature   THIS REPORT MUST BE COMPLETED AND MAILED WITHIN 3 WORKING DAYS OF TREATMENT   Place  [facility], EMERGENCY DEPT  Name of Facility   [facility]   Date  10/16/2017 Diagnosis  (S61.411A) Laceration of right hand, foreign body presence unspecified, initial encounter Is there evidence the injured employee was under the influence of alcohol and/or another controlled substance at the time of accident?   Hour  7:44 PM Description of Injury or Disease  Laceration of right hand, foreign body presence unspecified, initial encounter No   Treatment  1.  Laceration repair  2.  Tetanus  Have you advised the patient to remain off work five days or more?         No   X-Ray Findings      If Yes   From Date    To Date      From information given by the employee, together with medical evidence, can you directly connect this injury or occupational disease as job incurred?  Yes If No, is the employee capable of: Full Duty  No Modified Duty  Yes   Is additional medical care by a physician indicated?  Yes If Modified Duty, Specify any Limitations /  "Restrictions  Light duty ×3 days     Do you know of any previous injury or disease contributing to this condition or occupational disease?  No   Date  10/16/2017 Print Doctor’s Name  Gansert, Guy G I certify the employer’s copy of this form was mailed on:   Address  [address] [address] [phone] Insurer’s Use Only   [facility]  [address]    Provider’s Tax ID Number  [id number] Telephone  Dept: [phone]    Doctor’s Signature  e-SignGANSERT, GUY G M.D. Degree   MD    Original - TREATING PHYSICIAN OR CHIROPRACTOR   Pg 2-Insurer/TPA   Pg 3-Employer   Pg 4-Employee                                                                                                  Form C-4 (rev01/03)     BRIEF DESCRIPTION OF RIGHTS AND BENEFITS  (Pursuant to NRS 616C.050)    Notice of Injury or Occupational Disease (Incident Report Form C-1): If an injury or occupational disease (OD) arises out of and in the course of employment, you must provide written notice to your employer as soon as practicable, but no later than 7 days after the accident or OD. Your employer shall maintain a sufficient supply of the required forms.    Claim for Compensation (Form C-4): If medical treatment is sought, the form C-4 is available at the place of initial treatment. A completed \"Claim for Compensation\" (Form C-4) must be filed within 90 days after an accident or OD. The treating physician or chiropractor must, within 3 working days after treatment, complete and mail to the employer, the employer's insurer and third-party , the Claim for Compensation.    Medical Treatment: If you require medical treatment for your on-the-job injury or OD, you may be required to select a physician or chiropractor from a list provided by your workers’ compensation insurer, if it has contracted with an Organization for Managed Care (MCO) or Preferred Provider Organization (PPO) or providers of health care. If your " employer has not entered into a contract with an MCO or PPO, you may select a physician or chiropractor from the Panel of Physicians and Chiropractors. Any medical costs related to your industrial injury or OD will be paid by your insurer.    Temporary Total Disability (TTD): If your doctor has certified that you are unable to work for a period of at least 5 consecutive days, or 5 cumulative days in a 20-day period, or places restrictions on you that your employer does not accommodate, you may be entitled to TTD compensation.    Temporary Partial Disability (TPD): If the wage you receive upon reemployment is less than the compensation for TTD to which you are entitled, the insurer may be required to pay you TPD compensation to make up the difference. TPD can only be paid for a maximum of 24 months.    Permanent Partial Disability (PPD): When your medical condition is stable and there is an indication of a PPD as a result of your injury or OD, within 30 days, your insurer must arrange for an evaluation by a rating physician or chiropractor to determine the degree of your PPD. The amount of your PPD award depends on the date of injury, the results of the PPD evaluation and your age and wage.    Permanent Total Disability (PTD): If you are medically certified by a treating physician or chiropractor as permanently and totally disabled and have been granted a PTD status by your insurer, you are entitled to receive monthly benefits not to exceed 66 2/3% of your average monthly wage. The amount of your PTD payments is subject to reduction if you previously received a PPD award.    Vocational Rehabilitation Services: You may be eligible for vocational rehabilitation services if you are unable to return to the job due to a permanent physical impairment or permanent restrictions as a result of your injury or occupational disease.    Transportation and Per Emelia Reimbursement: You may be eligible for travel expenses and per  gabby associated with medical treatment.  Reopening: You may be able to reopen your claim if your condition worsens after claim closure.    Appeal Process: If you disagree with a written determination issued by the insurer or the insurer does not respond to your request, you may appeal to the Department of Administration, , by following the instructions contained in your determination letter. You must appeal the determination within 70 days from the date of the determination letter at [address], [address], [place], [place] [address], or [address], [place], [place] [address]. If you disagree with the  decision, you may appeal to the Department of Administration, . You must file your appeal within 30 days from the date of the  decision letter at [address], [address], [place], [place] [address], or [address] [facility] [address], [place], [place] [address]. If you disagree with a decision of an , you may file a petition for judicial review with the District Court. You must do so within 30 days of the Appeal Officer’s decision. You may be represented by an  at your own expense or you may contact the [facility] for possible representation.    [place]  for Injured Workers (NAIW): If you disagree with a  decision, you may request that NAIW represent you without charge at an  Hearing. For information regarding denial of benefits, you may contact the [facility] at: [address], [address], [place] [address], [phone], or [address], [place] [address], [phone]    To File a Complaint with the Division: If you wish to file a complaint with the  of the Division of Industrial Relations (DIR), please contact the Workers’ Compensation Section, [address], [address], [place], [place] [address], telephone [phone])  [phone], or [address], [address], [place], [place] [address], telephone [phone]) [phone].    For assistance with Workers’ Compensation Issues: you may contact the Office of the Governor Consumer Health Assistance, [address] [place] [address], [address], [place], [place] [address], Toll Free 1-172.893.7414, Web site: http://Smart Voicemail.[facility].nv., E-mail [email or web address]                                                                                                                                                                               __________________________________________________________________                                    ___10/16/2017___            Employee Name / Signature                                                                                                                            Date                                       D-2 (rev. 10/07)

## 2017-10-16 NOTE — LETTER
"  FORM C-4:  EMPLOYEE’S CLAIM FOR COMPENSATION/ REPORT OF INITIAL TREATMENT  EMPLOYEE’S CLAIM - PROVIDE ALL INFORMATION REQUESTED   First Name  Sharmin Last Name  Juliane Birthdate             Age  1965 52 y.o. 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 (if applicable)  Pulling in New Tires    How did this injury or occupational disease occur? Be specific and answer in detail. Use additional sheet if necessary)  pulling in tires and nit hand on [employer] [place] [employer] meatle was sticking out and cut hand   If you believe that you have an occupational disease, when did you first have knowledge of the disability and it relationship to your employment?  NA Witnesses to the Accident  NA     Nature of Injury or Occupational Disease  Laceration  Part(s) of Body Injured or Affected  Finger (R), N/A, N/A    I certify that the above is true and correct to the best of my knowledge and that I have provided this information in order to " obtain the benefits of [place]’s [place] and [place] Acts ([place] 616A to [place], inclusive or Chapter 617 of NRS).  I hereby authorize any physician, chiropractor, surgeon, practitioner, or other person, any hospital, including [facility], any medical service organization, any insurance company, or other institution or organization to release to each other, any medical or other information, including benefits paid or payable, pertinent to this injury or disease, except information relative to diagnosis, treatment and/or counseling for AIDS, psychological conditions, alcohol or controlled substances, for which I must give specific authorization.  A Photostat of this authorization shall be as valid as the original.   Date  10/16/2017 Place  [facility] Employee’s Signature   THIS REPORT MUST BE COMPLETED AND MAILED WITHIN 3 WORKING DAYS OF TREATMENT   Place  [facility], EMERGENCY DEPT  Name of Facility   [facility]   Date  10/16/2017 Diagnosis  (S61.411A) Laceration of right hand, foreign body presence unspecified, initial encounter Is there evidence the injured employee was under the influence of alcohol and/or another controlled substance at the time of accident?   Hour  7:48 PM Description of Injury or Disease  Laceration of right hand, foreign body presence unspecified, initial encounter No   Treatment  1.  Laceration repair  2.  Tetanus  Have you advised the patient to remain off work five days or more?         No   X-Ray Findings      If Yes   From Date    To Date      From information given by the employee, together with medical evidence, can you directly connect this injury or occupational disease as job incurred?  Yes If No, is the employee capable of: Full Duty  No Modified Duty  Yes   Is additional medical care by a physician indicated?  Yes If Modified Duty, Specify any Limitations /  "Restrictions  Light duty ×3 days     Do you know of any previous injury or disease contributing to this condition or occupational disease?  No   Date  10/16/2017 Print Doctor’s Name  Gansert, Guy G I certify the employer’s copy of this form was mailed on:   Address  [address] [address] [phone] Insurer’s Use Only   [facility]  [address]    Provider’s Tax ID Number  [id number] Telephone  Dept: [phone]    Doctor’s Signature  e-SignGANSERT, GUY G M.D. Degree   MD    Original - TREATING PHYSICIAN OR CHIROPRACTOR   Pg 2-Insurer/TPA   Pg 3-Employer   Pg 4-Employee                                                                                                  Form C-4 (rev01/03)     BRIEF DESCRIPTION OF RIGHTS AND BENEFITS  (Pursuant to NRS 616C.050)    Notice of Injury or Occupational Disease (Incident Report Form C-1): If an injury or occupational disease (OD) arises out of and in the course of employment, you must provide written notice to your employer as soon as practicable, but no later than 7 days after the accident or OD. Your employer shall maintain a sufficient supply of the required forms.    Claim for Compensation (Form C-4): If medical treatment is sought, the form C-4 is available at the place of initial treatment. A completed \"Claim for Compensation\" (Form C-4) must be filed within 90 days after an accident or OD. The treating physician or chiropractor must, within 3 working days after treatment, complete and mail to the employer, the employer's insurer and third-party , the Claim for Compensation.    Medical Treatment: If you require medical treatment for your on-the-job injury or OD, you may be required to select a physician or chiropractor from a list provided by your workers’ compensation insurer, if it has contracted with an Organization for Managed Care (MCO) or Preferred Provider Organization (PPO) or providers of health care. If your " employer has not entered into a contract with an MCO or PPO, you may select a physician or chiropractor from the Panel of Physicians and Chiropractors. Any medical costs related to your industrial injury or OD will be paid by your insurer.    Temporary Total Disability (TTD): If your doctor has certified that you are unable to work for a period of at least 5 consecutive days, or 5 cumulative days in a 20-day period, or places restrictions on you that your employer does not accommodate, you may be entitled to TTD compensation.    Temporary Partial Disability (TPD): If the wage you receive upon reemployment is less than the compensation for TTD to which you are entitled, the insurer may be required to pay you TPD compensation to make up the difference. TPD can only be paid for a maximum of 24 months.    Permanent Partial Disability (PPD): When your medical condition is stable and there is an indication of a PPD as a result of your injury or OD, within 30 days, your insurer must arrange for an evaluation by a rating physician or chiropractor to determine the degree of your PPD. The amount of your PPD award depends on the date of injury, the results of the PPD evaluation and your age and wage.    Permanent Total Disability (PTD): If you are medically certified by a treating physician or chiropractor as permanently and totally disabled and have been granted a PTD status by your insurer, you are entitled to receive monthly benefits not to exceed 66 2/3% of your average monthly wage. The amount of your PTD payments is subject to reduction if you previously received a PPD award.    Vocational Rehabilitation Services: You may be eligible for vocational rehabilitation services if you are unable to return to the job due to a permanent physical impairment or permanent restrictions as a result of your injury or occupational disease.    Transportation and Per Emelia Reimbursement: You may be eligible for travel expenses and per  gabby associated with medical treatment.  Reopening: You may be able to reopen your claim if your condition worsens after claim closure.    Appeal Process: If you disagree with a written determination issued by the insurer or the insurer does not respond to your request, you may appeal to the Department of Administration, , by following the instructions contained in your determination letter. You must appeal the determination within 70 days from the date of the determination letter at [address], [address], [place], [place] [address], or [address], [place], [place] [address]. If you disagree with the  decision, you may appeal to the Department of Administration, . You must file your appeal within 30 days from the date of the  decision letter at [address], [address], [place], [place] [address], or [address] [facility] [address], [place], [place] [address]. If you disagree with a decision of an , you may file a petition for judicial review with the District Court. You must do so within 30 days of the Appeal Officer’s decision. You may be represented by an  at your own expense or you may contact the [facility] for possible representation.    [place]  for Injured Workers ([place]IW): If you disagree with a  decision, you may request that NAIW represent you without charge at an  Hearing. For information regarding denial of benefits, you may contact the [facility] at: [address], [address], [place] [address], [phone], or [address], [place] [address], [phone]    To File a Complaint with the Division: If you wish to file a complaint with the  of the Division of Industrial Relations (DIR), please contact the Workers’ Compensation Section, [address], [address], [place], [place] [address], telephone (214)  560-1004, or [address], [address], [place], [place] [address], telephone [phone]) [phone].    For assistance with Workers’ Compensation Issues: you may contact the Office of the Governor Consumer Health Assistance, [address] [place] [address], [address], [place], [place] [address], Toll Free 1-804.595.4099, Web site: http://Kuailexue.[facility].nv., E-mail [email or web address]                                                                                                                                                                               __________________________________________________________________                                    ___10/16/2017____            Employee Name / Signature                                                                                                                            Date                                       D-2 (rev. 10/07)

## 2017-10-17 ENCOUNTER — PATIENT OUTREACH (OUTPATIENT)
Dept: HEALTH INFORMATION MANAGEMENT | Facility: OTHER | Age: 52
End: 2017-10-17

## 2017-10-17 NOTE — DISCHARGE INSTRUCTIONS
Laceration Care, Adult  A laceration is a cut that goes through all layers of the skin. The cut also goes into the tissue that is right under the skin. Some cuts heal on their own. Others need to be closed with stitches (sutures), staples, skin adhesive strips, or wound glue. Taking care of your cut lowers your risk of infection and helps your cut to heal better.  HOW TO TAKE CARE OF YOUR CUT  For stitches or staples:  · Keep the wound clean and dry.  · If you were given a bandage (dressing), you should change it at least one time per day or as told by your doctor. You should also change it if it gets wet or dirty.  · Keep the wound completely dry for the first 24 hours or as told by your doctor. After that time, you may take a shower or a bath. However, make sure that the wound is not soaked in water until after the stitches or staples have been removed.  · Clean the wound one time each day or as told by your doctor:  ¨ Wash the wound with soap and water.  ¨ Rinse the wound with water until all of the soap comes off.  ¨ Pat the wound dry with a clean towel. Do not rub the wound.  · After you clean the wound, put a thin layer of antibiotic ointment on it as told by your doctor. This ointment:  ¨ Helps to prevent infection.  ¨ Keeps the bandage from sticking to the wound.  · Have your stitches or staples removed as told by your doctor.  If your doctor used skin adhesive strips:   · Keep the wound clean and dry.  · If you were given a bandage, you should change it at least one time per day or as told by your doctor. You should also change it if it gets dirty or wet.  · Do not get the skin adhesive strips wet. You can take a shower or a bath, but be careful to keep the wound dry.  · If the wound gets wet, pat it dry with a clean towel. Do not rub the wound.  · Skin adhesive strips fall off on their own. You can trim the strips as the wound heals. Do not remove any strips that are still stuck to the wound. They will  fall off after a while.  If your doctor used wound glue:  · Try to keep your wound dry, but you may briefly wet it in the shower or bath. Do not soak the wound in water, such as by swimming.  · After you take a shower or a bath, gently pat the wound dry with a clean towel. Do not rub the wound.  · Do not do any activities that will make you really sweaty until the skin glue has fallen off on its own.  · Do not apply liquid, cream, or ointment medicine to your wound while the skin glue is still on.  · If you were given a bandage, you should change it at least one time per day or as told by your doctor. You should also change it if it gets dirty or wet.  · If a bandage is placed over the wound, do not let the tape for the bandage touch the skin glue.  · Do not pick at the glue. The skin glue usually stays on for 5-10 days. Then, it falls off of the skin.  General Instructions   · To help prevent scarring, make sure to cover your wound with sunscreen whenever you are outside after stitches are removed, after adhesive strips are removed, or when wound glue stays in place and the wound is healed. Make sure to wear a sunscreen of at least 30 SPF.  · Take over-the-counter and prescription medicines only as told by your doctor.  · If you were given antibiotic medicine or ointment, take or apply it as told by your doctor. Do not stop using the antibiotic even if your wound is getting better.  · Do not scratch or pick at the wound.  · Keep all follow-up visits as told by your doctor. This is important.  · Check your wound every day for signs of infection. Watch for:  ¨ Redness, swelling, or pain.  ¨ Fluid, blood, or pus.  · Raise (elevate) the injured area above the level of your heart while you are sitting or lying down, if possible.  GET HELP IF:  · You got a tetanus shot and you have any of these problems at the injection site:  ¨ Swelling.  ¨ Very bad pain.  ¨ Redness.  ¨ Bleeding.  · You have a fever.  · A wound that was  closed breaks open.  · You notice a bad smell coming from your wound or your bandage.  · You notice something coming out of the wound, such as wood or glass.  · Medicine does not help your pain.  · You have more redness, swelling, or pain at the site of your wound.  · You have fluid, blood, or pus coming from your wound.  · You notice a change in the color of your skin near your wound.  · You need to change the bandage often because fluid, blood, or pus is coming from the wound.  · You start to have a new rash.  · You start to have numbness around the wound.  GET HELP RIGHT AWAY IF:  · You have very bad swelling around the wound.  · Your pain suddenly gets worse and is very bad.  · You notice painful lumps near the wound or on skin that is anywhere on your body.  · You have a red streak going away from your wound.  · The wound is on your hand or foot and you cannot move a finger or toe like you usually can.  · The wound is on your hand or foot and you notice that your fingers or toes look pale or bluish.     This information is not intended to replace advice given to you by your health care provider. Make sure you discuss any questions you have with your health care provider.     Document Released: 06/05/2009 Document Revised: 05/03/2016 Document Reviewed: 12/14/2015  [employer] Interactive Patient Education ©2016 "[employer]

## 2017-10-17 NOTE — ED PROVIDER NOTES
"ED Provider Note    CHIEF COMPLAINT  Chief Complaint   Patient presents with   • Hand Laceration       [facility]  Sharmin Cardozo is a 52 y.o. female who presents For evaluation of a hand laceration.  The patient was working today when she struck her right hand against some type of metal resulting laceration.  She presents here for evaluation.  Patient denies paresthesias distal to the area.  She has no difficulty extending the finger.  No recent illness.  No other acute symptomatology or complaints.    REVIEW OF SYSTEMS  See HPI for further details.  No history of: Diabetes, seizures, cardiac disorders, gastrointestinal disorder;    PAST MEDICAL HISTORY  Past Medical History:   Diagnosis Date   • ASTHMA    • Hypertension    • Physiological ovarian cysts        FAMILY HISTORY  Family History   Problem Relation Age of Onset   • Heart Disease Father        SOCIAL HISTORY  Positive tobacco use; the injury occurred while working    SURGICAL HISTORY  Past Surgical History:   Procedure Laterality Date   • CYSTOSCOPY  3/24/2009    Performed by ZORAN BRIGGS at SURGERY SAME DAY [facility] ORS   • VAGINAL HYSTERECTOMY SCOPE TOTAL  3/24/2009    Per[facility]IGGS [facility]   • CHOLECYSTECTOMY     • OTHER      left elbow   • PB VAG DELIV ONLY,PBEV C-SECTN      x 4   • TUBAL LIGATION         CURRENT MEDICATIONS  See nurses notes    ALLERGIES  Allergies   Allergen Reactions   • Bactrim Rash   • Iodine Nausea       PHYSICAL EXAM  VITAL SIGNS: /81   Pulse 97   Temp 37.1 °C (98.8 °F)   Resp 18   Ht 1.575 m (5' 2\")   Wt 97.7 kg (215 lb 6.2 oz)   LMP 04/17/2008   SpO2 97%   BMI 39.40 kg/m²    Constitutional: Well developed, Well nourished, Anxious, oriented ×3  HENT: Normocephalic, Atraumatic  Cardiovascular: Regular rate and rhythm without mumurs, gallups, rubs   Thorax & Lungs: Normal Equal breath sounds, No respiratory distress, No wheezing, no stridor, no rales. No chest tenderness. "   Musculoskeletal: Right hand: The patient has a 1.5 cm laceration on the dorsal aspect of the MCP joints of the right 5th finger; extensor tendon mechanisms intact; local exploration reveals no neurovascular tendinous injury; motor, sensory, vascular intact distally  Neurologic: Alert & oriented x 3,  No gross focal deficits noted.     COURSE & MEDICAL DECISION MAKING  Pertinent Labs & Imaging studies reviewed. (See chart for details)  1.  Tetanus    Procedure note: The patient's laceration was prepped and draped in sterile fashion. The skin was anesthetized with 1% lidocaine.  The wound was irrigated with high-pressure normal saline and the skin was cleansed with Betadine prep.  The wound was explored.  Laceration was closed with 4-0 Ethilon.  5 interrupted sutures placed.  Wound is cleansed and dressed.  No complications.    Discussion: At this time, the patient has findings consistent with a laceration with no associated neurovascular or tendinous injuries.  Treatment was initiated as noted above.  At this time, the patient will follow-up at [facility] tomorrow.  I discussed the findings treatment plan with the patient.  She indicates she is comfortable with this explanation and disposition.    FINAL IMPRESSION  1. Laceration of right hand, foreign body presence unspecified, initial encounter        PLAN  1.  Appropriate discharge instructions given  2.  Stitches out in 7-10 days  3.  Follow-up at [facility] tomorrow    Electronically signed by: Guy G Gansert, 10/16/2017 7:09 PM

## 2017-11-04 DIAGNOSIS — B37.0 ORAL THRUSH: ICD-10-CM

## 2017-11-04 DIAGNOSIS — J44.1 COPD WITH ACUTE EXACERBATION (HCC): ICD-10-CM

## 2017-11-04 DIAGNOSIS — F17.200 SMOKING: ICD-10-CM

## 2017-11-04 DIAGNOSIS — J98.01 ACUTE BRONCHOSPASM: ICD-10-CM

## 2017-11-08 RX ORDER — IPRATROPIUM BROMIDE AND ALBUTEROL SULFATE 2.5; .5 MG/3ML; MG/3ML
3 SOLUTION RESPIRATORY (INHALATION) EVERY 4 HOURS PRN
Qty: 75 ML | Refills: 0 | Status: SHIPPED | OUTPATIENT
Start: 2017-11-08 | End: 2019-01-09

## 2017-11-09 NOTE — TELEPHONE ENCOUNTER
From: Sharmin Cardozo  Sent: 11/4/2017 6:31 AM PDT  Subject: Medication Renewal Request    Sharmin Cardozo would like a refill of the following medications:  ipratropium-albuterol (DUONEB) 0.5-2.5 (3) MG/3ML nebulizer solution [Almaz Dennis, P.A.-C.]    Preferred pharmacy: [address], [place] - [address]    Comment:

## 2017-11-19 ENCOUNTER — OFFICE VISIT (OUTPATIENT)
Dept: URGENT CARE | Facility: PHYSICIAN GROUP | Age: 52
End: 2017-11-19
Payer: COMMERCIAL

## 2017-11-19 VITALS
OXYGEN SATURATION: 94 % | WEIGHT: 215 LBS | DIASTOLIC BLOOD PRESSURE: 89 MMHG | TEMPERATURE: 98.6 F | BODY MASS INDEX: 40.59 KG/M2 | HEIGHT: 61 IN | HEART RATE: 74 BPM | SYSTOLIC BLOOD PRESSURE: 160 MMHG

## 2017-11-19 DIAGNOSIS — M79.672 FOOT PAIN, LEFT: ICD-10-CM

## 2017-11-19 PROBLEM — M79.673 FOOT PAIN: Status: ACTIVE | Noted: 2017-11-19

## 2017-11-19 PROCEDURE — 99204 OFFICE O/P NEW MOD 45 MIN: CPT | Performed by: PHYSICIAN ASSISTANT

## 2017-11-19 RX ORDER — PREDNISONE 10 MG/1
TABLET ORAL
Qty: 34 TAB | Refills: 0 | Status: SHIPPED | OUTPATIENT
Start: 2017-11-19 | End: 2017-12-21

## 2017-11-20 NOTE — PROGRESS NOTES
Urgent Care Note   This patients PCP is: KE Negro    Reason for visit:  Left foot pain and swelling    HPI:  Sharmin Cardozo is a 52 y.o. old patient who [facility].  This is a pleasant patient and I appreciate the opportunity to participate in the care of this patient.  This is a new problem today    1.  Left foot pain and swelling  This patient has left foot pain and swelling for the last one day    Woke up with pain today. No trauma no twists no falls.  Hurts to walk      ROS:   Constitutional: no fatigue,   HEENT: No Headache, No sore throat.  Ears:  No ear pain  Eyes:  No blurred vision, No goopy/crusted eyes  Cardiac: No racing heart rate  Resp: No shortness of breath,  No cough, No wheezing.  Gastro: No nausea or vomiting, No diarrhea.    All other systems were reviewed and were negative.      Past Medical History:  Patient Active Problem List    Diagnosis Date Noted   • Chest pain, likely musculoskeletal 02/02/2017     Priority: High   • Nicotine dependence, cigarettes, uncomplicated 12/13/2016     Priority: Medium   • COPD with asthma (CMS-HCC) 02/03/2017     Priority: Low   • Leukocytosis 12/13/2016     Priority: Low   • Foot pain, left 11/19/2017   • Dyslipidemia 03/01/2017   • Elevated hemoglobin A1c 03/01/2017   • Bilateral chronic knee pain 03/01/2017   • Obesity, morbid, BMI 40.0-49.9 (CMS-HCC) 02/08/2017   • Family history of coronary artery disease 02/08/2017   • Essential hypertension 01/04/2017   • Premature surgical menopause 01/04/2017   • Obesity (BMI 30-39.9) 01/04/2017   • COPD with exacerbation (CMS-HCC) 12/12/2016       Past Surgical History:  Past Surgical History:   Procedure Laterality Date   • CYSTOSCOPY  3/24/2009    Performed by ZORAN BRIGGS at SURGERY SAME DAY [facility] ORS   • VAGINAL HYSTERECTOMY SCOPE TOTAL  3/24/2009    Performed by ZORAN BRIGGS at SURGERY SAME DAY [facility] ORS   • CHOLECYSTECTOMY     •  OTHER      left elbow   • PB VAG DELIV ONLY,PBEV C-SECTN      x 4   • TUBAL LIGATION         Allergies:  Bactrim; Iodine; and Tetanus toxoid    Social History:  Social History     Social History   • Marital status:      Spouse name: N/A   • Number of children: 4   • Years of education: N/A     Occupational History   •  [email or web address] [address] [email or web address]     Social History Main Topics   • Smoking status: Current Every Day Smoker     Packs/day: 0.25     Types: Cigarettes     Last attempt to quit: 2/2/2017   • Smokeless tobacco: Never Used      Comment: Vape with no nicotine    • Alcohol use No      Comment: 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, 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)   • Drug use: No   • Sexual activity: Yes     Partners: Male     Other Topics Concern   • Not on file     Social History Narrative   • No narrative on file       Family History:  Family History   Problem Relation Age of Onset   • Heart Disease Father        Medications:    Current Outpatient Prescriptions:   •  predniSONE (DELTASONE) 10 MG Tab, Day 1-2 60mg, Day 3-5 40mg, Day 6-8 20mg, Day 9-12 10mg., Disp: 34 Tab, Rfl: 0  •  ipratropium-albuterol (DUONEB) 0.5-2.5 (3) MG/3ML nebulizer solution, 3 mL by Nebulization route every four hours as needed for Shortness of Breath (bronchospasm)., Disp: 75 mL, Rfl: 0  •  metoclopramide (REGLAN) 10 MG Tab, Take 1 Tab by mouth 4 times a day., Disp: 60 Tab, Rfl: 0  •  ondansetron (ZOFRAN ODT) 4 MG TABLET DISPERSIBLE, Take 1 Tab by mouth every 8 hours as needed for Nausea/Vomiting., Disp: 10 Tab, Rfl: 0  •  metoclopramide (REGLAN) 10 MG Tab, Take 1 Tab by mouth 4 times a day., Disp: 60 Tab, Rfl: 0  •  ondansetron (ZOFRAN) 4 MG Tab tablet, Take 1 Tab by mouth every four hours as needed for Nausea/Vomiting., Disp: 20 Tab, Rfl: 1  •  meloxicam (MOBIC) 15 MG tablet, Take 1 Tab by mouth every day. Take with meal, Disp: 30 Tab, Rfl: 1  •  acetaminophen-codeine #3 (TYLENOL #3) 300-30 MG Tab, Take 1-2 Tabs by mouth every 6 hours as needed  "for Severe Pain. Do not drink alcohol, drive, or operate machinery while taking., Disp: 20 Tab, Rfl: 1  •  polyethylene glycol 3350 (MIRALAX) Powder, Take 17 g by mouth at bedtime as needed (constipation)., Disp: 1 Bottle, Rfl: 0  •  bisacodyl (DUCODYL) 5 MG EC tablet, Take 2 Tabs by mouth every day., Disp: 30 Tab, Rfl: 0  •  amlodipine (NORVASC) 10 MG Tab, TAKE ONE TABLET BY MOUTH ONCE DAILY, Disp: 90 Tab, Rfl: 1  •  atorvastatin (LIPITOR) 20 MG Tab, , Disp: , Rfl:   •  aspirin EC (ECOTRIN) 81 MG Tablet Delayed Response, Take 81 mg by mouth every day., Disp: , Rfl:   •  carvedilol (COREG) 6.25 MG Tab, Take 6.25 mg by mouth 2 times a day, with meals., Disp: , Rfl:   •  varenicline (CHANTIX STARTING MONTH NATHALIE) 0.5 MG X 11 & 1 MG X 42 tablet, Take as directed, Disp: 56 Tab, Rfl: 3  •  cyclobenzaprine (FLEXERIL) 5 MG tablet, Take 1-2 Tabs by mouth 3 times a day as needed for Moderate Pain or Muscle Spasms., Disp: 30 Tab, Rfl: 0  •  budesonide-formoterol (SYMBICORT) 160-4.5 MCG/ACT Aerosol, Inhale 2 Puffs by mouth 2 Times a Day., Disp: 2 Inhaler, Rfl: 0  •  albuterol 108 (90 BASE) MCG/ACT Aero Soln inhalation aerosol, Inhale 2 Puffs by mouth every 6 hours as needed for Shortness of Breath., Disp: 8.5 g, Rfl: 1  •  Multiple Vitamin (DAILY VITAMIN PO), Take 1 Tab by mouth every day., Disp: , Rfl:     Physical Examination:   Vital signs: /89 Comment: Large cuff   Pulse 74   Temp 37 °C (98.6 °F)   Ht 1.549 m (5' 1\")   Wt 97.5 kg (215 lb)   LMP 04/17/2008   SpO2 94%   BMI 40.62 kg/m²   General: No distress, cooperative, well dressed and well nourished.   Eyes: No scleral icterus or discharge, No hyposphagma  ENMT: Normal on external inspection of nose, lips, No nasal drainage   Neck: No abnormal masses on inspection  Resp: Normal effort, Bilateral clear to auscultation, No wheezing,   CVS: Regular rate and rhythm,  No murmur.   Extremities: No edema bilateral extremities  Neuro: Alert and oriented  Skin: No " rash, No Ulcers  Psych: Normal mood and affect      Assessment and Plan:    1.  Left foot pain and swelling  Gout   Prednisone taper given and see PCP if needed    Follow-up with your PCP in 3-7 days if you are not getting better.  Return to UC or the ER if symptoms become worse.  Patient understands these discharge instructions.      Kali Sharma P.A.-C.  11/19/17    CC:   KE Negro

## 2017-11-28 ENCOUNTER — OFFICE VISIT (OUTPATIENT)
Dept: URGENT CARE | Facility: PHYSICIAN GROUP | Age: 52
End: 2017-11-28
Payer: COMMERCIAL

## 2017-11-28 VITALS
DIASTOLIC BLOOD PRESSURE: 86 MMHG | HEART RATE: 78 BPM | OXYGEN SATURATION: 98 % | BODY MASS INDEX: 42.86 KG/M2 | WEIGHT: 227 LBS | TEMPERATURE: 98.4 F | HEIGHT: 61 IN | SYSTOLIC BLOOD PRESSURE: 136 MMHG

## 2017-11-28 DIAGNOSIS — M10.9 ACUTE GOUT INVOLVING TOE, UNSPECIFIED CAUSE, UNSPECIFIED LATERALITY: ICD-10-CM

## 2017-11-28 DIAGNOSIS — I10 HTN (HYPERTENSION), BENIGN: ICD-10-CM

## 2017-11-28 DIAGNOSIS — E66.01 MORBID OBESITY WITH BMI OF 40.0-44.9, ADULT (HCC): ICD-10-CM

## 2017-11-28 PROCEDURE — 99214 OFFICE O/P EST MOD 30 MIN: CPT | Performed by: FAMILY MEDICINE

## 2017-11-28 RX ORDER — AMLODIPINE BESYLATE 10 MG/1
10 TABLET ORAL DAILY
Qty: 30 TAB | Refills: 0 | Status: SHIPPED | OUTPATIENT
Start: 2017-11-28 | End: 2018-01-19 | Stop reason: SDUPTHER

## 2017-11-28 RX ORDER — COLCHICINE 0.6 MG/1
0.6 TABLET ORAL 2 TIMES DAILY
Qty: 10 TAB | Refills: 0 | Status: SHIPPED | OUTPATIENT
Start: 2017-11-28 | End: 2017-12-03

## 2017-11-28 RX ORDER — INDOMETHACIN 50 MG/1
50 CAPSULE ORAL 3 TIMES DAILY
Qty: 60 CAP | Refills: 0 | Status: SHIPPED | OUTPATIENT
Start: 2017-11-28 | End: 2017-12-21

## 2017-11-28 RX ORDER — HYDROCODONE BITARTRATE AND ACETAMINOPHEN 5; 325 MG/1; MG/1
1 TABLET ORAL EVERY 4 HOURS PRN
Qty: 10 TAB | Refills: 0 | Status: SHIPPED | OUTPATIENT
Start: 2017-11-28 | End: 2018-02-23

## 2017-11-28 NOTE — PATIENT INSTRUCTIONS
Gout  Gout is an inflammatory arthritis caused by a buildup of uric acid crystals in the joints. Uric acid is a chemical that is normally present in the blood. When the level of uric acid in the blood is too high it can form crystals that deposit in your joints and tissues. This causes joint redness, soreness, and swelling (inflammation). Repeat attacks are common. Over time, uric acid crystals can form into masses (tophi) near a joint, destroying bone and causing disfigurement. Gout is treatable and often preventable.  CAUSES   The disease begins with elevated levels of uric acid in the blood. Uric acid is produced by your body when it breaks down a naturally found substance called purines. Certain foods you eat, such as meats and fish, contain high amounts of purines. Causes of an elevated uric acid level include:  · Being passed down from parent to child (heredity).  · Diseases that cause increased uric acid production (such as obesity, psoriasis, and certain cancers).  · Excessive alcohol use.  · Diet, especially diets rich in meat and seafood.  · Medicines, including certain cancer-fighting medicines (chemotherapy), water pills (diuretics), and aspirin.  · Chronic kidney disease. The kidneys are no longer able to remove uric acid well.  · Problems with metabolism.  Conditions strongly associated with gout include:  · Obesity.  · High blood pressure.  · High cholesterol.  · Diabetes.  Not everyone with elevated uric acid levels gets gout. It is not understood why some people get gout and others do not. Surgery, joint injury, and eating too much of certain foods are some of the factors that can lead to gout attacks.  SYMPTOMS   · An attack of gout comes on quickly. It causes intense pain with redness, swelling, and warmth in a joint.  · Fever can occur.  · Often, only one joint is involved. Certain joints are more commonly involved:  ¨ Base of the big toe.  ¨ Knee.  ¨ Ankle.  ¨ Wrist.  ¨ Finger.  Without  treatment, an attack usually goes away in a few days to weeks. Between attacks, you usually will not have symptoms, which is different from many other forms of arthritis.  DIAGNOSIS   Your caregiver will suspect gout based on your symptoms and exam. In some cases, tests may be recommended. The tests may include:  · Blood tests.  · Urine tests.  · X-rays.  · Joint fluid exam. This exam requires a needle to remove fluid from the joint (arthrocentesis). Using a microscope, gout is confirmed when uric acid crystals are seen in the joint fluid.  TREATMENT   There are two phases to gout treatment: treating the sudden onset (acute) attack and preventing attacks (prophylaxis).  · Treatment of an Acute Attack.  ¨ Medicines are used. These include anti-inflammatory medicines or steroid medicines.  ¨ An injection of steroid medicine into the affected joint is sometimes necessary.  ¨ The painful joint is rested. Movement can worsen the arthritis.  ¨ You may use warm or cold treatments on painful joints, depending which works best for you.  · Treatment to Prevent Attacks.  ¨ If you suffer from frequent gout attacks, your caregiver may advise preventive medicine. These medicines are started after the acute attack subsides. These medicines either help your kidneys eliminate uric acid from your body or decrease your uric acid production. You may need to stay on these medicines for a very long time.  ¨ The early phase of treatment with preventive medicine can be associated with an increase in acute gout attacks. For this reason, during the first few months of treatment, your caregiver may also advise you to take medicines usually used for acute gout treatment. Be sure you understand your caregiver's directions. Your caregiver may make several adjustments to your medicine dose before these medicines are effective.  ¨ Discuss dietary treatment with your caregiver or dietitian. Alcohol and drinks high in sugar and fructose and foods  such as meat, poultry, and seafood can increase uric acid levels. Your caregiver or dietitian can advise you on drinks and foods that should be limited.  HOME CARE INSTRUCTIONS   · Do not take aspirin to relieve pain. This raises uric acid levels.  · Only take over-the-counter or prescription medicines for pain, discomfort, or fever as directed by your caregiver.  · Rest the joint as much as possible. When in bed, keep sheets and blankets off painful areas.  · Keep the affected joint raised (elevated).  · Apply warm or cold treatments to painful joints. Use of warm or cold treatments depends on which works best for you.  · Use crutches if the painful joint is in your leg.  · Drink enough fluids to keep your urine clear or pale yellow. This helps your body get rid of uric acid. Limit alcohol, sugary drinks, and fructose drinks.  · Follow your dietary instructions. Pay careful attention to the amount of protein you eat. Your daily diet should emphasize fruits, vegetables, whole grains, and fat-free or low-fat milk products. Discuss the use of coffee, vitamin C, and cherries with your caregiver or dietitian. These may be helpful in lowering uric acid levels.  · Maintain a healthy body weight.  SEEK MEDICAL CARE IF:   · You develop diarrhea, vomiting, or any side effects from medicines.  · You do not feel better in 24 hours, or you are getting worse.  SEEK IMMEDIATE MEDICAL CARE IF:   · Your joint becomes suddenly more tender, and you have chills or a fever.  MAKE SURE YOU:   · Understand these instructions.  · Will watch your condition.  · Will get help right away if you are not doing well or get worse.     This information is not intended to replace advice given to you by your health care provider. Make sure you discuss any questions you have with your health care provider.     Document Released: 12/15/2001 Document Revised: 01/08/2016 Document Reviewed: 07/31/2013  [employer] Interactive Patient Education ©2016  [employer]

## 2017-11-28 NOTE — PROGRESS NOTES
"Subjective:      Chief Complaint   Patient presents with   • Foot Problem     Severe bilateral foot pain x2 wks. Previously dx Gout on 11/19, Rx prescribed isn't helping              HPI        C/o dull, constant, throbbing left and right great toe pain, intermittently over the past year.   Pain acutely worse in past 2 wks.    Denies trauma.  Pain worse with weightbearing.   States that she was recently diagnosed with gout, but the steroid only helped for the first 2 days.       #2.   HTN -  Taking medication  as prescribed.    No side effects.   She does not check at home.  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   Respiratory: Negative for shortness of breath.    Cardiovascular: Negative for chest pain.   Neurological: Negative for tingling and focal weakness.   All other systems reviewed and are negative.         Objective:     Blood pressure 136/86, pulse 78, temperature 36.9 °C (98.4 °F), height 1.549 m (5' 1\"), weight 103 kg (227 lb), last menstrual period 04/17/2008, SpO2 98 %.    Physical Exam   Constitutional: pt is oriented to person, place, and time. Pt appears well-developed. No distress.   HENT:   Head: Normocephalic and atraumatic.   Eyes: Conjunctivae are normal.   Cardiovascular: Normal rate and regular rhythm.    Pulmonary/Chest: Effort normal and breath sounds normal. No respiratory distress. Pt has no wheezes.   Musculoskeletal:   swelling, warmth, erythema, and tenderness isolated to first MTP joint on both feet.   Neurological: He is alert and oriented to person, place, and time. No cranial nerve " deficit.   Skin: Skin is warm. Pt is not diaphoretic. No erythema.   Psychiatric: pt's behavior is normal.   Nursing note and vitals reviewed.              Assessment/Plan:        1. Acute gout involving toe, unspecified cause, unspecified laterality     - colchicine (COLCRYS) 0.6 MG Tab; Take 1 Tab by mouth 2 times a day for 5 days.  Dispense: 10 Tab; Refill: 0  - hydrocodone-acetaminophen (NORCO) 5-325 MG Tab per tablet; Take 1 Tab by mouth every four hours as needed.  Dispense: 10 Tab; Refill: 0  - indomethacin (INDOCIN) 50 MG Cap; Take 1 Cap by mouth 3 times a day.  Dispense: 60 Cap; Refill: 0    2. Morbid obesity with BMI of 40.0-44.9, adult ([facility], [facility])     - Patient identified as having weight management issue.  Appropriate orders and counseling given.    3. HTN (hypertension), benign  At goal  - amlodipine (NORVASC) 10 MG Tab; Take 1 Tab by mouth every day.  Dispense: 30 Tab; Refill: 0

## 2017-12-19 ENCOUNTER — HOSPITAL ENCOUNTER (OUTPATIENT)
Dept: LAB | Facility: MEDICAL CENTER | Age: 52
End: 2017-12-19
Attending: NURSE PRACTITIONER
Payer: COMMERCIAL

## 2017-12-19 ENCOUNTER — APPOINTMENT (OUTPATIENT)
Dept: RADIOLOGY | Facility: IMAGING CENTER | Age: 52
End: 2017-12-19
Attending: NURSE PRACTITIONER
Payer: COMMERCIAL

## 2017-12-19 ENCOUNTER — OFFICE VISIT (OUTPATIENT)
Dept: MEDICAL GROUP | Facility: PHYSICIAN GROUP | Age: 52
End: 2017-12-19
Payer: COMMERCIAL

## 2017-12-19 VITALS
HEIGHT: 61 IN | HEART RATE: 73 BPM | DIASTOLIC BLOOD PRESSURE: 88 MMHG | OXYGEN SATURATION: 96 % | SYSTOLIC BLOOD PRESSURE: 130 MMHG | BODY MASS INDEX: 41.91 KG/M2 | TEMPERATURE: 98.5 F | WEIGHT: 222 LBS

## 2017-12-19 DIAGNOSIS — I10 ESSENTIAL HYPERTENSION: ICD-10-CM

## 2017-12-19 DIAGNOSIS — B35.1 FUNGAL TOENAIL INFECTION: ICD-10-CM

## 2017-12-19 DIAGNOSIS — M79.671 FOOT PAIN, BILATERAL: ICD-10-CM

## 2017-12-19 DIAGNOSIS — R73.09 ELEVATED HEMOGLOBIN A1C: ICD-10-CM

## 2017-12-19 DIAGNOSIS — M79.672 FOOT PAIN, BILATERAL: ICD-10-CM

## 2017-12-19 DIAGNOSIS — E89.40 PREMATURE SURGICAL MENOPAUSE: ICD-10-CM

## 2017-12-19 DIAGNOSIS — Z83.3 FAMILY HISTORY OF DIABETES MELLITUS IN FIRST DEGREE RELATIVE: ICD-10-CM

## 2017-12-19 LAB
ANION GAP SERPL CALC-SCNC: 5 MMOL/L (ref 0–11.9)
BUN SERPL-MCNC: 21 MG/DL (ref 8–22)
CALCIUM SERPL-MCNC: 9.4 MG/DL (ref 8.5–10.5)
CHLORIDE SERPL-SCNC: 109 MMOL/L (ref 96–112)
CO2 SERPL-SCNC: 28 MMOL/L (ref 20–33)
CREAT SERPL-MCNC: 0.69 MG/DL (ref 0.5–1.4)
EST. AVERAGE GLUCOSE BLD GHB EST-MCNC: 108 MG/DL
GFR SERPL CREATININE-BSD FRML MDRD: >60 ML/MIN/1.73 M 2
GLUCOSE SERPL-MCNC: 82 MG/DL (ref 65–99)
HBA1C MFR BLD: 5.4 % (ref 0–5.6)
POTASSIUM SERPL-SCNC: 3.9 MMOL/L (ref 3.6–5.5)
SODIUM SERPL-SCNC: 142 MMOL/L (ref 135–145)
TSH SERPL DL<=0.005 MIU/L-ACNC: 0.56 UIU/ML (ref 0.38–5.33)
URATE SERPL-MCNC: 3.7 MG/DL (ref 1.9–8.2)

## 2017-12-19 PROCEDURE — 83036 HEMOGLOBIN GLYCOSYLATED A1C: CPT

## 2017-12-19 PROCEDURE — 99214 OFFICE O/P EST MOD 30 MIN: CPT | Performed by: NURSE PRACTITIONER

## 2017-12-19 PROCEDURE — 77077 JOINT SURVEY SINGLE VIEW: CPT | Mod: TC | Performed by: NURSE PRACTITIONER

## 2017-12-19 PROCEDURE — 80048 BASIC METABOLIC PNL TOTAL CA: CPT

## 2017-12-19 PROCEDURE — 84443 ASSAY THYROID STIM HORMONE: CPT

## 2017-12-19 PROCEDURE — 84550 ASSAY OF BLOOD/URIC ACID: CPT

## 2017-12-19 PROCEDURE — 36415 COLL VENOUS BLD VENIPUNCTURE: CPT

## 2017-12-19 ASSESSMENT — PATIENT HEALTH QUESTIONNAIRE - PHQ9: CLINICAL INTERPRETATION OF PHQ2 SCORE: 0

## 2017-12-19 NOTE — PROGRESS NOTES
Subjective:     Chief Complaint   Patient presents with   • Gout     bilateral feet    • Leg Swelling     bilateral leg and ankle swelling      Sharmin Cardozo is a 52 y.o. female here today for evaluation and management of issues listed below.    Evaluated at  for lower leg swelling and joint pain in feet, diagnosed with gout. No imaging or labs completed. Denies any red, hot, swollen joints but has pain in feet, particularly pain in b/l MTP but also describes LE edema and tingling pin on bottoms of feet.   Mother with DM, both sets of grandparents with gout.      1. Foot pain, bilateral    2. Fungal toenail infection    3. Essential hypertension    4. Premature surgical menopause    5. Elevated hemoglobin A1c    6. Family history of diabetes mellitus in first degree relative        ROS   Denies HA, chest pain, increased shortness of breath, abdominal pain, bladder or bowel changes, lower extremity edema. All other pertinent systems reviewed and are negative except as in HPI.    Allergies: Bactrim; Iodine; and Tetanus toxoid  Current medicines (including changes today)  Current Outpatient Prescriptions   Medication Sig Dispense Refill   • hydrocodone-acetaminophen (NORCO) 5-325 MG Tab per tablet Take 1 Tab by mouth every four hours as needed. 10 Tab 0   • amlodipine (NORVASC) 10 MG Tab Take 1 Tab by mouth every day. 30 Tab 0   • ipratropium-albuterol (DUONEB) 0.5-2.5 (3) MG/3ML nebulizer solution 3 mL by Nebulization route every four hours as needed for Shortness of Breath (bronchospasm). 75 mL 0   • aspirin EC (ECOTRIN) 81 MG Tablet Delayed Response Take 81 mg by mouth every day.     • albuterol 108 (90 BASE) MCG/ACT Aero Soln inhalation aerosol Inhale 2 Puffs by mouth every 6 hours as needed for Shortness of Breath. 8.5 g 1   • Multiple Vitamin (DAILY VITAMIN PO) Take 1 Tab by mouth every day.     • bisacodyl (DUCODYL) 5 MG EC tablet Take 2 Tabs by mouth every day. 30 Tab 0   • atorvastatin (LIPITOR) 20 MG  "Tab      • carvedilol (COREG) 6.25 MG Tab Take 6.25 mg by mouth 2 times a day, with meals.       No current facility-administered medications for this visit.        She  has a past medical history of ASTHMA; Hypertension; and Physiological ovarian cysts.      Health Maintenance: Reviewed and updated.      Objective:   Blood pressure 130/88, pulse 73, temperature 36.9 °C (98.5 °F), height 1.549 m (5' 1\"), weight 100.7 kg (222 lb), last menstrual period 04/17/2008, SpO2 96 %. Body mass index is 41.95 kg/m².  Physical Exam   Alert, no acute distress. Pleasant and cooperative with the examination.  Eye contact is good, affect calm.  Skin: Warm, dry, no rashes in visible areas.    Eyes: Pupils equal, round. Conjunctiva and sclera clear, lids normal.  ENT: Pinna normal.  Neck: Supple, trachea midline.  Lungs: Normal effort and respirations. Clear to auscultation bilaterally.   Abdomen: No CVAT   CV: Regular rate and rhythm.  MS/Ext: Steady gait, no edema, toenails are thick and yellow/white, slightly erythematous surrounding nails. No erythema, warmth, or swelling of any joints of the feet.    Results reviewed  UC notes, labs    Assessment and Plan:   Treatment:    Sharmin was seen today for gout and leg swelling.    Diagnoses and all orders for this visit:    Foot pain, bilateral  Comments:  Labs and imaging ordered today. She'll be notified of results and referred to podiatry.  Orders:  -     URIC ACID; Future  -     DX-JOINT SURVEY-FEET SINGLE VIEW; Future  -     REFERRAL TO PODIATRY    Fungal toenail infection  -     REFERRAL TO PODIATRY    Essential hypertension  Comments:  Not well controlled, likely secondary to anti-inflammatories. Reviewed lifestyle modifications. We discussed medication changes secondary to the edema that she reports that she prefers not to make any changes at this time. Continue current medications and start regular monitoring. F/u with ongoing elevated readings were other concerns  Orders:  -    "  TSH WITH REFLEX TO FT4; Future  -     URIC ACID; Future  -     BASIC METABOLIC PANEL; Future    Premature surgical menopause    Elevated hemoglobin A1c  Family history of diabetes mellitus in first degree relative  -     HEMOGLOBIN A1C; Future    Counseled regarding lifestyle modifications. Will continue to monitor as needed.    Followup: Return in about 2 months (around 2/19/2018) for HTN. Sooner should new symptoms or problems arise, or as previously scheduled.         Reviewed side effects, risks, and benefits of medications prescribed today.  Advised to take all medications as instructed and report any side effects.   The patient voices understanding and agrees.  Report any new or worsening symptoms.  Have labs or other diagnostic studies prior to follow up.  Keep all appointments for any referrals given.        Please note this dictation was created using voice recognition software. Every reasonable attempt has been made to correct obvious errors, however there may be errors of grammar and possibly content that were not discovered before finalizing the note.

## 2018-01-19 DIAGNOSIS — I10 HTN (HYPERTENSION), BENIGN: ICD-10-CM

## 2018-01-19 RX ORDER — AMLODIPINE BESYLATE 10 MG/1
10 TABLET ORAL DAILY
Qty: 90 TAB | Refills: 1 | Status: SHIPPED | OUTPATIENT
Start: 2018-01-19 | End: 2018-09-11 | Stop reason: SDUPTHER

## 2018-01-19 NOTE — TELEPHONE ENCOUNTER
Pt met protocol?: Yes pt last ov 12/17   BP Readings from Last 1 Encounters:   12/19/17 130/88

## 2018-01-19 NOTE — TELEPHONE ENCOUNTER
Refill X 6 months, sent to pharmacy.Pt. Seen in the last 6 months per protocol.   Lab Results   Component Value Date/Time    SODIUM 142 12/19/2017 11:04 AM    POTASSIUM 3.9 12/19/2017 11:04 AM    CHLORIDE 109 12/19/2017 11:04 AM    CO2 28 12/19/2017 11:04 AM    GLUCOSE 82 12/19/2017 11:04 AM    BUN 21 12/19/2017 11:04 AM    CREATININE 0.69 12/19/2017 11:04 AM    CREATININE 0.9 03/19/2009 01:20 PM

## 2018-01-28 ENCOUNTER — OFFICE VISIT (OUTPATIENT)
Dept: URGENT CARE | Facility: PHYSICIAN GROUP | Age: 53
End: 2018-01-28
Payer: COMMERCIAL

## 2018-01-28 ENCOUNTER — APPOINTMENT (OUTPATIENT)
Dept: RADIOLOGY | Facility: IMAGING CENTER | Age: 53
End: 2018-01-28
Attending: PHYSICIAN ASSISTANT
Payer: COMMERCIAL

## 2018-01-28 VITALS
HEART RATE: 74 BPM | WEIGHT: 222 LBS | DIASTOLIC BLOOD PRESSURE: 84 MMHG | BODY MASS INDEX: 41.91 KG/M2 | OXYGEN SATURATION: 99 % | TEMPERATURE: 98.5 F | RESPIRATION RATE: 16 BRPM | SYSTOLIC BLOOD PRESSURE: 132 MMHG | HEIGHT: 61 IN

## 2018-01-28 DIAGNOSIS — R53.1 GENERALIZED WEAKNESS: ICD-10-CM

## 2018-01-28 DIAGNOSIS — R05.9 COUGH: ICD-10-CM

## 2018-01-28 DIAGNOSIS — R55 SYNCOPE AND COLLAPSE: ICD-10-CM

## 2018-01-28 LAB
FLUAV+FLUBV AG SPEC QL IA: NEGATIVE
INT CON NEG: NEGATIVE
INT CON POS: POSITIVE

## 2018-01-28 PROCEDURE — 87804 INFLUENZA ASSAY W/OPTIC: CPT | Performed by: PHYSICIAN ASSISTANT

## 2018-01-28 PROCEDURE — 99214 OFFICE O/P EST MOD 30 MIN: CPT | Performed by: PHYSICIAN ASSISTANT

## 2018-01-28 PROCEDURE — 71046 X-RAY EXAM CHEST 2 VIEWS: CPT | Mod: 26 | Performed by: PHYSICIAN ASSISTANT

## 2018-01-28 NOTE — LETTER
January 28, 2018         Patient: Sharmin Cardozo   YOB: 1965   Date of Visit: 1/28/2018           To Whom it May Concern:    Sharmin Cardozo was seen in my clinic on 1/28/2018. She is to be off today and tomorrow to recover.    If you have any questions or concerns, please don't hesitate to call.        Sincerely,           Vinita Connors P.A.-C.  Electronically Signed

## 2018-01-28 NOTE — PROGRESS NOTES
Chief Complaint   Patient presents with   • Cough     X 4 days feels like cant get full breath, fainted at work yesterday.        HISTORY OF PRESENT ILLNESS: Patient is a 52 y.o. female who presents today for about 4-5 days of cough, body aches, increased SOB from baseline. She has been coughing a mix of dry and wet.   Patient states that she passed out at work yesterday.   She has been feeling weak, tired and nausea.  She denies palpitations, chest pain or pain with breathing.  She has not had any traumas, leg pain or swelling. Still current daily smoker, smoking approx 4 cigarettes a day. She has had increased wheezing that has been somewhat responsive to her breathing treatments at home.  Did get flu shot in September.  No sore throat or nasal congestion.      Patient Active Problem List    Diagnosis Date Noted   • Chest pain, likely musculoskeletal 02/02/2017     Priority: High   • Nicotine dependence, cigarettes, uncomplicated 12/13/2016     Priority: Medium   • COPD with asthma (CMS-HCC) 02/03/2017     Priority: Low   • Leukocytosis 12/13/2016     Priority: Low   • Morbid obesity with BMI of 40.0-44.9, adult ([facility]) 11/28/2017   • Foot pain, left 11/19/2017   • Dyslipidemia 03/01/2017   • Elevated hemoglobin A1c 03/01/2017   • Bilateral chronic knee pain 03/01/2017   • Obesity, morbid, BMI 40.0-49.9 (CMS-HCC) 02/08/2017   • Family history of coronary artery disease 02/08/2017   • Essential hypertension 01/04/2017   • Premature surgical menopause 01/04/2017   • Obesity (BMI 30-39.9) 01/04/2017   • COPD with exacerbation (CMS-HCC) 12/12/2016       Allergies:Bactrim; Iodine; and Tetanus toxoid    Current Outpatient Prescriptions Ordered in [facility]   Medication Sig Dispense Refill   • amlodipine (NORVASC) 10 MG Tab Take 1 Tab by mouth every day. 90 Tab 1   • aspirin EC (ECOTRIN) 81 MG Tablet Delayed Response Take 81 mg by mouth every day.     • hydrocodone-acetaminophen (NORCO) 5-325 MG Tab per tablet Take 1 Tab by  mouth every four hours as needed. 10 Tab 0   • ipratropium-albuterol (DUONEB) 0.5-2.5 (3) MG/3ML nebulizer solution 3 mL by Nebulization route every four hours as needed for Shortness of Breath (bronchospasm). 75 mL 0   • bisacodyl (DUCODYL) 5 MG EC tablet Take 2 Tabs by mouth every day. 30 Tab 0   • atorvastatin (LIPITOR) 20 MG Tab      • carvedilol (COREG) 6.25 MG Tab Take 6.25 mg by mouth 2 times a day, with meals.     • albuterol 108 (90 BASE) MCG/ACT Aero Soln inhalation aerosol Inhale 2 Puffs by mouth every 6 hours as needed for Shortness of Breath. 8.5 g 1   • Multiple Vitamin (DAILY VITAMIN PO) Take 1 Tab by mouth every day.       No current [facility]-ordered facility-administered medications on file.        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   Respiratory: SEE HPI  Cardiovascular: Negative for chest pain, palpitations, orthopnea and leg swelling.   Gastrointestinal: Negative for heartburn, nausea, vomiting and abdominal pain.   All other systems reviewed and are negative.       Exam:  Blood pressure 132/84, pulse 74, temperature 36.9 °C (98.5 °F), resp. rate 16, height 1.549 m  "(5' 1\"), weight 100.7 kg (222 lb), last menstrual period 04/17/2008, SpO2 99 %.  General:  Well nourished, well developed female in NAD  Eyes: PERRLA, EOM within normal limits, no conjunctival injection, no scleral icterus, visual fields and acuity grossly intact.  Ears: Normal shape and symmetry, no tenderness, no discharge. External canals are without any significant edema or erythema. Tympanic membranes are without any inflammation, no effusion. Gross auditory acuity is intact  Nose: Symmetrical, sinuses without tenderness, no discharge.   Mouth: reasonable hygiene, no erythema exudates or tonsillar enlargement.  Neck: no masses, range of motion within normal limits, no tracheal deviation. No lymphadenopathy  Pulmonary: Normal respiratory effort, no wheezes, crackles, or rhonchi.  Cardiovascular: regular rate and rhythm without murmurs, rubs, or gallops..  Skin: No visible rashes or lesion. Warm, pink, dry.   Extremities: no clubbing, cyanosis, or edema.  Neuro: A&O x 3. Speech normal/clear.  Normal gait.     EKG Interpretation   Interpreted by me   Rhythm: normal sinus   Rate: normal   Axis: normal   Ectopy: none   Conduction: normal   ST Segments: no acute change   T Waves: no acute change   Q Waves: none   Clinical Impression: no acute changes    RAD    FINDINGS:  The heart is normal in size.  No pulmonary infiltrates or consolidations are noted.  No pleural effusions are appreciated.     Impression       No active disease.   Reading Provider Reading Date   Charbel Nicole M.D. Jan 28, 2018   Signing Provider Signing        Assessment/Plan:  1. Generalized weakness  DX-CHEST-2 VIEWS    EKG    POCT Influenza A/B   2. Syncope and collapse  EKG   3. Cough  DX-CHEST-2 VIEWS    POCT Influenza A/B       -RAD and EKG grossly unremarkable  -influenza negative.   -vitals stable in clinic, lungs CTA, sats 99%.   -patient states she just feels weak and tired despite our work up and emphasized to patient given this and " her syncopal episode that she can be worked up further in ER and offered/emphasized this several times.  She declines this at this time.    Her symptoms are most consistent with viral illness, possible influenza like illness at day 4-5.   -work note provided as requested and patient will monitor her symptoms, complete breathing treatments prn, fluids and rest.    -advised if not improving or continuing to feel weak she is to go to ED or call 911.  She expresses understanding of this.   -she will make appt to follow up with PCP this week.       Supportive care, differential diagnoses, and indications for immediate follow-up discussed with patient.   Pathogenesis of diagnosis discussed including typical length and natural progression.   Instructed to return to clinic or nearest emergency department for any change in condition, further concerns, or worsening of symptoms.  Patient states understanding of the plan of care and discharge instructions.      Vinita Connors P.A.-C.

## 2018-02-23 ENCOUNTER — OFFICE VISIT (OUTPATIENT)
Dept: URGENT CARE | Facility: PHYSICIAN GROUP | Age: 53
End: 2018-02-23
Payer: COMMERCIAL

## 2018-02-23 VITALS
HEART RATE: 76 BPM | DIASTOLIC BLOOD PRESSURE: 70 MMHG | TEMPERATURE: 98.1 F | BODY MASS INDEX: 40.22 KG/M2 | OXYGEN SATURATION: 94 % | SYSTOLIC BLOOD PRESSURE: 130 MMHG | WEIGHT: 213 LBS | HEIGHT: 61 IN

## 2018-02-23 DIAGNOSIS — R05.9 COUGH: ICD-10-CM

## 2018-02-23 LAB
FLUAV+FLUBV AG SPEC QL IA: NEGATIVE
INT CON NEG: NORMAL
INT CON POS: NORMAL

## 2018-02-23 PROCEDURE — 87804 INFLUENZA ASSAY W/OPTIC: CPT | Performed by: PHYSICIAN ASSISTANT

## 2018-02-23 PROCEDURE — 99214 OFFICE O/P EST MOD 30 MIN: CPT | Performed by: PHYSICIAN ASSISTANT

## 2018-02-23 RX ORDER — AZITHROMYCIN 250 MG/1
TABLET, FILM COATED ORAL
Qty: 6 TAB | Refills: 0 | Status: SHIPPED | OUTPATIENT
Start: 2018-02-25 | End: 2018-03-17

## 2018-02-23 RX ORDER — CODEINE PHOSPHATE AND GUAIFENESIN 10; 100 MG/5ML; MG/5ML
SOLUTION ORAL
Qty: 100 ML | Refills: 0 | Status: SHIPPED | OUTPATIENT
Start: 2018-02-23 | End: 2018-03-04

## 2018-02-23 NOTE — PROGRESS NOTES
"Chief Complaint   Patient presents with   • Cough     headache, body aches, sinus pain, sore throat, x3 days        HISTORY OF PRESENT ILLNESS: Patient is a 52 y.o. female who presents today for 3 days of runny nose, sinus congestion, sore throat and some body aches.   Patient states she has been taking DayQuil and NyQuil and it \"hasn't touched it\"  She has been doing breathing treatments at home with mild temporary relief. No chest pain or painful breathing.   No fevers noted throughout the course of illness.    She works outside parts of the day and feels this has really made it worse.  Requests note for the day.     Patient Active Problem List    Diagnosis Date Noted   • Chest pain, likely musculoskeletal 02/02/2017     Priority: High   • Nicotine dependence, cigarettes, uncomplicated 12/13/2016     Priority: Medium   • COPD with asthma (CMS-HCC) 02/03/2017     Priority: Low   • Leukocytosis 12/13/2016     Priority: Low   • Morbid obesity with BMI of 40.0-44.9, adult ([facility]) 11/28/2017   • Foot pain, left 11/19/2017   • Dyslipidemia 03/01/2017   • Elevated hemoglobin A1c 03/01/2017   • Bilateral chronic knee pain 03/01/2017   • Obesity, morbid, BMI 40.0-49.9 (CMS-HCC) 02/08/2017   • Family history of coronary artery disease 02/08/2017   • Essential hypertension 01/04/2017   • Premature surgical menopause 01/04/2017   • Obesity (BMI 30-39.9) 01/04/2017   • COPD with exacerbation (CMS-HCC) 12/12/2016       Allergies:Bactrim; Iodine; and Tetanus toxoid    Current Outpatient Prescriptions Ordered in [facility]   Medication Sig Dispense Refill   • amlodipine (NORVASC) 10 MG Tab Take 1 Tab by mouth every day. 90 Tab 1   • ipratropium-albuterol (DUONEB) 0.5-2.5 (3) MG/3ML nebulizer solution 3 mL by Nebulization route every four hours as needed for Shortness of Breath (bronchospasm). 75 mL 0   • bisacodyl (DUCODYL) 5 MG EC tablet Take 2 Tabs by mouth every day. 30 Tab 0   • aspirin EC (ECOTRIN) 81 MG Tablet Delayed Response Take " "81 mg by mouth every day.     • albuterol 108 (90 BASE) MCG/ACT Aero Soln inhalation aerosol Inhale 2 Puffs by mouth every 6 hours as needed for Shortness of Breath. 8.5 g 1   • Multiple Vitamin (DAILY VITAMIN PO) Take 1 Tab by mouth every day.       No current [facility]-ordered facility-administered medications on file.        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   Respiratory: SEE HPI  Cardiovascular: Negative for chest pain, palpitations, orthopnea and leg swelling.   Gastrointestinal: Negative for heartburn, nausea, vomiting and abdominal pain.   All other systems reviewed and are negative.       Exam:  Blood pressure 130/70, pulse 76, temperature 36.7 °C (98.1 °F), height 1.549 m (5' 1\"), weight 96.6 kg (213 lb), last menstrual period 2008, SpO2 94 %.  General:  Well nourished, well developed female in NAD  Eyes: PERRLA, EOM within normal limits, no conjunctival injection, no scleral icterus, visual fields and acuity grossly intact.  Ears: Normal shape and symmetry, no tenderness, no discharge. External canals are without any significant edema or erythema. Tympanic membranes are " "without any inflammation, no effusion. Gross auditory acuity is intact  Nose: Symmetrical, sinuses without tenderness, clear rhinorrhea.   Mouth: reasonable hygiene, no erythema exudates or tonsillar enlargement.  Neck: no masses, range of motion within normal limits, no tracheal deviation. No lymphadenopathy  Pulmonary: Normal respiratory effort, few faint end exp wheezes, without crackles, or rhonchi.  Cardiovascular: regular rate and rhythm without murmurs, rubs, or gallops.  Skin: No visible rashes or lesion. Warm, pink, dry.   Extremities: no clubbing, cyanosis, or edema.  Neuro: A&O x 3. Speech normal/clear.  Normal gait.         Assessment/Plan:  1. Cough  guaifenesin-codeine (CHERATUSSIN AC) Solution oral solution    azithromycin (ZITHROMAX) 250 MG Tab         -codeine cough syrup as above.  Emphasized drowsy and no driving on this medicine.  Patient expressed understanding of this.   -work note provided  -humidifier/steamy showers recommended for lung soothing.  Rest and fluids emphasized.   -patient expresses concern about getting \"double pneumonia\" if no abx . Discussed at this time seems viral in etiology however Contingent antibiotic prescription given to patient to fill upon meeting criteria of guidelines discussed.   -RTC and ER precautions if worsening despite treatments, new fevers, worsening SOB      Supportive care, differential diagnoses, and indications for immediate follow-up discussed with patient.   Pathogenesis of diagnosis discussed including typical length and natural progression.   Instructed to return to clinic or nearest emergency department for any change in condition, further concerns, or worsening of symptoms.  Patient states understanding of the plan of care and discharge instructions.      Vinita Connors P.A.-C.    "

## 2018-02-23 NOTE — LETTER
February 23, 2018         Patient: Sharmin Cardozo   YOB: 1965   Date of Visit: 2/23/2018           To Whom it May Concern:    Sharmin Cardozo was seen in my clinic on 2/23/2018. She is to be out of work through Sunday to recover.      If you have any questions or concerns, please don't hesitate to call.        Sincerely,           Vinita Connors P.A.-C.  Electronically Signed

## 2018-03-17 ENCOUNTER — OFFICE VISIT (OUTPATIENT)
Dept: URGENT CARE | Facility: PHYSICIAN GROUP | Age: 53
End: 2018-03-17
Payer: COMMERCIAL

## 2018-03-17 ENCOUNTER — APPOINTMENT (OUTPATIENT)
Dept: RADIOLOGY | Facility: IMAGING CENTER | Age: 53
End: 2018-03-17
Attending: NURSE PRACTITIONER
Payer: COMMERCIAL

## 2018-03-17 ENCOUNTER — SUPERVISING PHYSICIAN REVIEW (OUTPATIENT)
Dept: URGENT CARE | Facility: PHYSICIAN GROUP | Age: 53
End: 2018-03-17

## 2018-03-17 VITALS
WEIGHT: 220.4 LBS | TEMPERATURE: 98.8 F | BODY MASS INDEX: 41.61 KG/M2 | HEIGHT: 61 IN | DIASTOLIC BLOOD PRESSURE: 70 MMHG | HEART RATE: 85 BPM | SYSTOLIC BLOOD PRESSURE: 140 MMHG | OXYGEN SATURATION: 94 %

## 2018-03-17 DIAGNOSIS — M25.562 ACUTE PAIN OF LEFT KNEE: ICD-10-CM

## 2018-03-17 DIAGNOSIS — S80.02XA CONTUSION OF LEFT KNEE, INITIAL ENCOUNTER: ICD-10-CM

## 2018-03-17 DIAGNOSIS — M17.12 OSTEOARTHRITIS OF LEFT KNEE, UNSPECIFIED OSTEOARTHRITIS TYPE: ICD-10-CM

## 2018-03-17 DIAGNOSIS — Z71.6 TOBACCO ABUSE COUNSELING: ICD-10-CM

## 2018-03-17 PROCEDURE — 99406 BEHAV CHNG SMOKING 3-10 MIN: CPT | Performed by: NURSE PRACTITIONER

## 2018-03-17 PROCEDURE — 73562 X-RAY EXAM OF KNEE 3: CPT | Mod: TC,LT | Performed by: NURSE PRACTITIONER

## 2018-03-17 PROCEDURE — 99214 OFFICE O/P EST MOD 30 MIN: CPT | Mod: 25 | Performed by: NURSE PRACTITIONER

## 2018-03-17 RX ORDER — KETOROLAC TROMETHAMINE 30 MG/ML
60 INJECTION, SOLUTION INTRAMUSCULAR; INTRAVENOUS ONCE
Status: COMPLETED | OUTPATIENT
Start: 2018-03-17 | End: 2018-03-17

## 2018-03-17 RX ADMIN — KETOROLAC TROMETHAMINE 60 MG: 30 INJECTION, SOLUTION INTRAMUSCULAR; INTRAVENOUS at 13:57

## 2018-03-17 ASSESSMENT — ENCOUNTER SYMPTOMS
CHILLS: 0
FALLS: 1
NAUSEA: 0
SHORTNESS OF BREATH: 0
NUMBNESS: 0
EYE PAIN: 0
COUGH: 0
SORE THROAT: 0
VOMITING: 0
JOINT SWELLING: 1
FEVER: 0
WEAKNESS: 0
MYALGIAS: 0
DIZZINESS: 0
DIAPHORESIS: 0

## 2018-03-17 ASSESSMENT — PAIN SCALES - GENERAL: PAINLEVEL: 7=MODERATE-SEVERE PAIN

## 2018-03-17 NOTE — PROGRESS NOTES
Subjective:     Sharmin Cardozo is a 52 y.o. female who presents for Knee Injury (L knee, pt slipped on ice, swollen, redness, bruising, lump, onset 6:30am today)  Patient presents  with complaints of left knee pain. Patient fell on 2  different occasions this morning on the ice  directly on her left kneecap. Patient has tried icing and wearing a knee brace however pain has significantly worsened throughout the day. Patient denies any previous knee injuries to the left knee.     Knee Injury   This is a new problem. The current episode started today. The problem occurs constantly. The problem has been unchanged. Associated symptoms include joint swelling. Pertinent negatives include no chest pain, chills, coughing, diaphoresis, fever, myalgias, nausea, numbness, rash, sore throat, vomiting or weakness. Nothing aggravates the symptoms. She has tried nothing for the symptoms. The treatment provided no relief.     Past Medical History:   Diagnosis Date   • ASTHMA    • Hypertension    • Physiological ovarian cysts      Past Surgical History:   Procedure Laterality Date   • CYSTOSCOPY  3/24/2009    Performed by ZORAN BRIGGS at [facility]   • VAGINAL HYSTERECTOMY SCOPE TOTAL  3/24/2009    Performed by ZORAN BRIGGS at [facility]   • CHOLECYSTECTOMY     • OTHER      left elbow   • PB VAG DELIV ONLY,PBEV C-SECTN      x 4   • TUBAL LIGATION       Social History     Social History   • Marital status:      Spouse name: N/A   • Number of children: 4   • Years of education: N/A     Occupational History   •  [employer] [place] [id number]     Social History Main Topics   • Smoking status: Current Every Day Smoker     Packs/day: 0.25     Types: Cigarettes     Last attempt to quit: 2/2/2017   • Smokeless tobacco: Never Used      Comment: Vape with no nicotine    • Alcohol use No      Comment: 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   HENT: Negative for sore throat.    Eyes: Negative for pain.   Respiratory: Negative for cough and shortness of breath.    Cardiovascular: Negative for chest pain.   Gastrointestinal: Negative for nausea and vomiting.   Genitourinary: Negative for hematuria.   Musculoskeletal: Positive for falls, joint pain and joint swelling. Negative for myalgias.   Skin: Negative for rash.   Neurological: Negative for dizziness, weakness and numbness.     Allergies   Allergen Reactions   • Bactrim Rash   • Iodine Nausea   • Tetanus Toxoid Rash and Swelling     Rash & swelling from tetanus vaccine       Objective:   /70   Pulse 85   Temp 37.1 °C (98.8 °F)   Ht 1.549 m (5' 1\")   Wt 100 kg (220 lb 6.4 oz)   LMP 04/17/2008   SpO2 94%   BMI 41.64 kg/m²   Physical Exam   Constitutional: She is oriented to person, place, and time. She appears well-developed and well-nourished. No distress.   HENT:   Head: Normocephalic and atraumatic.   Eyes: Conjunctivae and EOM are normal. Pupils are equal, round, and reactive to light.   Cardiovascular: Normal rate and regular rhythm.    No murmur heard.  Pulmonary/Chest: Effort normal and breath sounds normal. No respiratory distress.   Abdominal: Soft. She exhibits no distension. There is no tenderness.   Musculoskeletal:        Left knee: She exhibits decreased range of motion, swelling, effusion, abnormal patellar mobility and bony tenderness. She exhibits no erythema, normal alignment, no LCL laxity, normal meniscus and no MCL laxity. Tenderness found. Patellar tendon tenderness noted.        Legs:  Knee /Derm.:  With noted swelling, no erythema, or no deformity. With tenderness noted over the medial " compartment or lateral compartments, and joint lines.  With noted effusion. DROM. Gait antalgic       Neurological: She is alert and oriented to person, place, and time. She has normal reflexes. No sensory deficit.   Skin: Skin is warm and dry.   Psychiatric: She has a normal mood and affect.   Vitals reviewed.        Assessment/Plan:   Assessment    1. Acute pain of left knee  DX-KNEE 3 VIEWS LEFT    ketorolac (TORADOL) injection 60 mg    REFERRAL TO SPORTS MEDICINE   2. Osteoarthritis of left knee, unspecified osteoarthritis type     3. Contusion of left knee, initial encounter     4. Tobacco abuse counseling       Xray results  There is no significant joint effusion.  There is no evidence of displaced  fracture or dislocation.  There is been slight progression of degenerative joint space narrowing and marginal spurring involving the medial compartment of the left knee. There is vacuum phenomenon in the meniscal space. There is minimal spurring along the posterior margin of the   patella    1.  There has been slight progression of predominantly medial compartment degenerative change in the left knee.      Advised Relative rest, ice, nsaid prn. Elevation and compression prn swelling. Resume activity as tolerated. Patient advised to use the crutches she has at home as needed. Advised weight bearing as tolerated. Will referred to sports medicine for further evaluation and management left knee.      Smoking cessation was discussed today for longer than 3 min. OTC Smoking cessation aids were discussed and pt. will consider such, however is not ready to quit at this time.       Differential diagnosis, natural history, supportive care, and indications for immediate follow-up discussed.

## 2018-05-05 ENCOUNTER — OFFICE VISIT (OUTPATIENT)
Dept: URGENT CARE | Facility: PHYSICIAN GROUP | Age: 53
End: 2018-05-05
Payer: COMMERCIAL

## 2018-05-05 VITALS
BODY MASS INDEX: 42.1 KG/M2 | SYSTOLIC BLOOD PRESSURE: 132 MMHG | WEIGHT: 223 LBS | DIASTOLIC BLOOD PRESSURE: 92 MMHG | OXYGEN SATURATION: 95 % | HEIGHT: 61 IN | HEART RATE: 74 BPM

## 2018-05-05 DIAGNOSIS — R05.9 COUGH: ICD-10-CM

## 2018-05-05 DIAGNOSIS — J01.40 ACUTE NON-RECURRENT PANSINUSITIS: Primary | ICD-10-CM

## 2018-05-05 PROCEDURE — 99214 OFFICE O/P EST MOD 30 MIN: CPT | Performed by: NURSE PRACTITIONER

## 2018-05-05 RX ORDER — AMOXICILLIN AND CLAVULANATE POTASSIUM 875; 125 MG/1; MG/1
1 TABLET, FILM COATED ORAL 2 TIMES DAILY
Qty: 20 TAB | Refills: 0 | Status: SHIPPED | OUTPATIENT
Start: 2018-05-05 | End: 2018-05-15

## 2018-05-05 ASSESSMENT — ENCOUNTER SYMPTOMS
CHILLS: 1
FEVER: 1
DIARRHEA: 0
SPUTUM PRODUCTION: 1
WEAKNESS: 1
NAUSEA: 0
RHINORRHEA: 1
VOMITING: 0
SORE THROAT: 1
MYALGIAS: 1
SHORTNESS OF BREATH: 0
COUGH: 1

## 2018-05-05 NOTE — PROGRESS NOTES
"Subjective:      Sharmin Cardozo is a 52 y.o. female who presents with Pharyngitis (sore throat, fever, URI sxs x 1 week )            Medications, Allergies and Prior Medical Hx reviewed and updated in [facility].with patient/family today         Cough   This is a new problem. The current episode started in the past 7 days. The problem has been waxing and waning. The cough is productive of sputum. Associated symptoms include chills, a fever, myalgias, nasal congestion, rhinorrhea and a sore throat. Pertinent negatives include no ear pain or shortness of breath. Nothing aggravates the symptoms. She has tried a beta-agonist inhaler for the symptoms. The treatment provided no relief. Her past medical history is significant for asthma.       Review of Systems   Constitutional: Positive for chills, fever and malaise/fatigue.   HENT: Positive for congestion, rhinorrhea and sore throat. Negative for ear discharge and ear pain.    Respiratory: Positive for cough and sputum production. Negative for shortness of breath.    Gastrointestinal: Negative for diarrhea, nausea and vomiting.   Musculoskeletal: Positive for myalgias.   Neurological: Positive for weakness.          Objective:     /92   Pulse 74   Ht 1.549 m (5' 1\")   Wt 101.2 kg (223 lb)   LMP 04/17/2008   SpO2 95%   Breastfeeding? No   BMI 42.14 kg/m²      Physical Exam   Constitutional: She appears well-developed and well-nourished. No distress.   HENT:   Head: Normocephalic and atraumatic.   Right Ear: Tympanic membrane and ear canal normal.   Left Ear: Tympanic membrane and ear canal normal.   Nose: Rhinorrhea present.   Mouth/Throat: Uvula is midline and mucous membranes are normal. No trismus in the jaw. No uvula swelling. Posterior oropharyngeal edema and posterior oropharyngeal erythema present. No oropharyngeal exudate.   Eyes: Conjunctivae are normal. Pupils are equal, round, and reactive to light.   Neck: Neck supple.   Cardiovascular: Normal rate, " regular rhythm and normal heart sounds.    Pulmonary/Chest: Effort normal and breath sounds normal. No respiratory distress. She has no wheezes.   Lymphadenopathy:     She has cervical adenopathy.   Neurological: She is alert.   Skin: Skin is warm and dry. Capillary refill takes less than 2 seconds.   Psychiatric: She has a normal mood and affect. Her behavior is normal.   Vitals reviewed.              Assessment/Plan:     1. Acute non-recurrent pansinusitis  amoxicillin-clavulanate (AUGMENTIN) 875-125 MG Tab   2. Cough         poct strep - neg    Rest, Fluids, tylenol, ibuprofen,  humidified air, and otc decongestants  Pt will go to the ER for worsening or changing symptoms as discussed,   Follow-up with your primary care provider or return here if not improving in 5 - 7 days   Discharge instructions discussed with pt/family who verbalize understanding and agreement with poc

## 2018-07-01 ENCOUNTER — OFFICE VISIT (OUTPATIENT)
Dept: URGENT CARE | Facility: PHYSICIAN GROUP | Age: 53
End: 2018-07-01
Payer: COMMERCIAL

## 2018-07-01 VITALS
RESPIRATION RATE: 16 BRPM | OXYGEN SATURATION: 97 % | DIASTOLIC BLOOD PRESSURE: 78 MMHG | TEMPERATURE: 98.7 F | WEIGHT: 228 LBS | BODY MASS INDEX: 43.05 KG/M2 | HEART RATE: 74 BPM | SYSTOLIC BLOOD PRESSURE: 126 MMHG | HEIGHT: 61 IN

## 2018-07-01 DIAGNOSIS — J02.9 ACUTE PHARYNGITIS, UNSPECIFIED ETIOLOGY: ICD-10-CM

## 2018-07-01 DIAGNOSIS — J40 BRONCHITIS: Primary | ICD-10-CM

## 2018-07-01 DIAGNOSIS — E66.01 OBESITY, MORBID, BMI 40.0-49.9 (HCC): ICD-10-CM

## 2018-07-01 DIAGNOSIS — J06.9 URI WITH COUGH AND CONGESTION: ICD-10-CM

## 2018-07-01 PROCEDURE — 99214 OFFICE O/P EST MOD 30 MIN: CPT | Performed by: PHYSICIAN ASSISTANT

## 2018-07-01 RX ORDER — PROMETHAZINE HYDROCHLORIDE AND CODEINE PHOSPHATE 6.25; 1 MG/5ML; MG/5ML
5 SYRUP ORAL 4 TIMES DAILY PRN
Qty: 240 ML | Refills: 0 | Status: SHIPPED | OUTPATIENT
Start: 2018-07-01 | End: 2018-07-15

## 2018-07-01 RX ORDER — ALBUTEROL SULFATE 90 UG/1
2 AEROSOL, METERED RESPIRATORY (INHALATION) EVERY 6 HOURS PRN
Qty: 8.5 G | Refills: 1 | Status: SHIPPED | OUTPATIENT
Start: 2018-07-01

## 2018-07-01 RX ORDER — DOXYCYCLINE HYCLATE 100 MG
100 TABLET ORAL 2 TIMES DAILY
Qty: 14 TAB | Refills: 0 | Status: SHIPPED | OUTPATIENT
Start: 2018-07-01 | End: 2018-07-08

## 2018-07-01 RX ORDER — PREDNISONE 20 MG/1
TABLET ORAL
Qty: 23 TAB | Refills: 0 | Status: SHIPPED | OUTPATIENT
Start: 2018-07-01 | End: 2018-08-14

## 2018-07-01 NOTE — PATIENT INSTRUCTIONS
Acute Bronchitis, Adult  Acute bronchitis is when air tubes (bronchi) in the lungs suddenly get swollen. The condition can make it hard to breathe. It can also cause these symptoms:  · A cough.  · Coughing up clear, yellow, or green mucus.  · Wheezing.  · Chest congestion.  · Shortness of breath.  · A fever.  · Body aches.  · Chills.  · A sore throat.  Follow these instructions at home:  Medicines  · Take over-the-counter and prescription medicines only as told by your doctor.  · If you were prescribed an antibiotic medicine, take it as told by your doctor. Do not stop taking the antibiotic even if you start to feel better.  General instructions  · Rest.  · Drink enough fluids to keep your pee (urine) clear or pale yellow.  · Avoid smoking and secondhand smoke. If you smoke and you need help quitting, ask your doctor. Quitting will help your lungs heal faster.  · Use an inhaler, cool mist vaporizer, or humidifier as told by your doctor.  · Keep all follow-up visits as told by your doctor. This is important.  How is this prevented?  To lower your risk of getting this condition again:  · Wash your hands often with soap and water. If you cannot use soap and water, use hand .  · Avoid contact with people who have cold symptoms.  · Try not to touch your hands to your mouth, nose, or eyes.  · Make sure to get the flu shot every year.  Contact a doctor if:  · Your symptoms do not get better in 2 weeks.  Get help right away if:  · You cough up blood.  · You have chest pain.  · You have very bad shortness of breath.  · You become dehydrated.  · You faint (pass out) or keep feeling like you are going to pass out.  · You keep throwing up (vomiting).  · You have a very bad headache.  · Your fever or chills gets worse.  This information is not intended to replace advice given to you by your health care provider. Make sure you discuss any questions you have with your health care provider.  Document Released: 06/05/2009  Document Revised: 07/26/2017 Document Reviewed: 06/07/2017  [employer] Patient Education © 2017 [employer]

## 2018-07-02 NOTE — PROGRESS NOTES
Subjective:      Pt is a 52 y.o. female who presents with Cough (tonsil stones, congestion, shaking x1 week )            HPI  PT presents to  clinic today complaining of sore throat, fevers, tonsil stones, watery eyes, pressure in ears, cough, fatigue, runny nose, wheezing and SOB. PT denies CP, NVD, abdominal pain, joint pain. PT states these symptoms began around 7 days ago. PT states the pain is a 7/10 with coughing fits, aching in nature and worse at night. . Pt has not taken any RX medications for this condition. The pt's medication list, problem list, and allergies have been evaluated and reviewed during today's visit.    PMH:  Past Medical History:   Diagnosis Date   • ASTHMA    • Hypertension    • Physiological ovarian cysts        PSH:  Past Surgical History:   Procedure Laterality Date   • CYSTOSCOPY  3/24/2009    Performed by ZORAN BRIGGS at [facility]   • VAGINAL HYSTERECTOMY SCOPE TOTAL  3/24/2009    Performed by ZORAN BRIGGS at [facility]   • CHOLECYSTECTOMY     • OTHER      left elbow   • PB VAG DELIV ONLY,PBEV C-SECTN      x 4   • TUBAL LIGATION         Fam Hx:    family history includes Heart Disease in her father.  Family Status   Relation Status   • Father      infection, possible Cardiac Arrest   • Mother Unknown    estranged   • Sister Alive    estranged, HCV   • Brother Alive    estranged, nursing home   • Daughter Alive   • Daughter Alive   • Son Alive   • Son Alive       Soc HX:  Social History     Social History   • Marital status:      Spouse name: N/A   • Number of children: 4   • Years of education: N/A     Occupational History   •  [employer] [place] [address]     Social History Main Topics   • Smoking status: Current Every Day Smoker     Packs/day: 0.25     Types: Cigarettes     Last attempt to quit: 2017   • Smokeless tobacco: Never Used      Comment: 3-4/day   • Alcohol use No      Comment: 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, 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)   • Drug use: No   • Sexual activity: Yes     Partners: Male     Other Topics Concern   • Not on file     Social History Narrative   • No narrative on file         Medications:    Current Outpatient Prescriptions:   •  albuterol 108 (90 Base) MCG/ACT Aero Soln inhalation aerosol, Inhale 2 Puffs by mouth every 6 hours as needed for Shortness of Breath., Disp: 8.5 g, Rfl: 1  •  predniSONE (DELTASONE) 20 MG Tab, Take 3 tabs at once PO daily x 5 days, then take 2 tabs at once daily x 3 days, then take 1 tab PO daily x 2 days, Disp: 23 Tab, Rfl: 0  •  doxycycline (VIBRAMYCIN) 100 MG Tab, Take 1 Tab by mouth 2 times a day for 7 days., Disp: 14 Tab, Rfl: 0  •  promethazine-codeine (PHENERGAN-CODEINE) 6.25-10 MG/5ML Syrup, Take 5 mL by mouth 4 times a day as needed for Cough for up to 14 days., Disp: 240 mL, Rfl: 0  •  amlodipine (NORVASC) 10 MG Tab, Take 1 Tab by mouth every day., Disp: 90 Tab, Rfl: 1  •  ipratropium-albuterol (DUONEB) 0.5-2.5 (3) MG/3ML nebulizer solution, 3 mL by Nebulization route every four hours as needed for Shortness of Breath (bronchospasm)., Disp: 75 mL, Rfl: 0  •  bisacodyl (DUCODYL) 5 MG EC tablet, Take 2 Tabs by mouth every day., Disp: 30 Tab, Rfl: 0  •  aspirin EC (ECOTRIN) 81 MG Tablet Delayed Response, Take 81 mg by mouth every day., Disp: , Rfl:   •  Multiple Vitamin (DAILY VITAMIN PO), Take 1 Tab by mouth every day., Disp: , Rfl:       Allergies:  Bactrim; Iodine; and Tetanus toxoid    ROS  Review of Systems   Constitutional: Positive formalaise/fatigue.  Negative for fever and diaphoresis.   HENT: Positive for congestion, ear pain and sore throat. Negative for ear discharge, hearing loss, nosebleeds and tinnitus.    Eyes: Negative for blurred vision, double vision and photophobia.   Respiratory: Positive for cough, sputum production, shortness of breath and wheezing. Negative for hemoptysis.    Cardiovascular: Negative for chest pain and palpitations.  "  Gastrointestinal: Negative for nausea, vomiting, abdominal pain, diarrhea and constipation.   Genitourinary: Negative for dysuria and flank pain.   Musculoskeletal: Negative for joint pain and myalgias.   Skin: Negative for itching and rash.   Neurological: Positive for headaches. Negative for dizziness, tingling and weakness.   Endo/Heme/Allergies: Does not bruise/bleed easily.   Psychiatric/Behavioral: Negative for depression. The patient is not nervous/anxious.             Objective:     /78   Pulse 74   Temp 37.1 °C (98.7 °F)   Resp 16   Ht 1.549 m (5' 1\")   Wt 103.4 kg (228 lb)   LMP 04/17/2008   SpO2 97%   BMI 43.08 kg/m²      Physical Exam      Physical Exam   Constitutional: PT is oriented to person, place, and time. PT appears well-developed and well-nourished. No distress.   HENT:   Head: Normocephalic and atraumatic.   Right Ear: Hearing, tympanic membrane, external ear and ear canal normal.   Left Ear: Hearing, tympanic membrane, external ear and ear canal normal.   Nose: Mucosal edema, rhinorrhea and sinus tenderness present. Right sinus exhibits frontal sinus tenderness. Left sinus exhibits frontal sinus tenderness.   Mouth/Throat: Uvula is midline. Mucous membranes are pale. Posterior oropharyngeal edema and posterior oropharyngeal erythema present. No oropharyngeal exudate.   Eyes: Conjunctivae normal and EOM are normal. Pupils are equal, round, and reactive to light. Right eye exhibits no discharge. Left eye exhibits no discharge.   Neck: Normal range of motion. Neck supple. No thyromegaly present.   Cardiovascular: Normal rate, regular rhythm, normal heart sounds and intact distal pulses.  Exam reveals no gallop and no friction rub.    No murmur heard.  Pulmonary/Chest: Effort normal. No respiratory distress. PT has wheezes. PT has no rales. PT exhibits tenderness.   Abdominal: Soft. Bowel sounds are normal. PT exhibits no distension and no mass. There is no tenderness. There is no " rebound and no guarding.   Musculoskeletal: Normal range of motion. PT exhibits no edema and no tenderness.   Lymphadenopathy:     PT has no cervical adenopathy.   Neurological: Pt is alert and oriented to person, place, and time. Pt has normal reflexes. No cranial nerve deficit.   Skin: Skin is warm and dry. No rash noted. No erythema.   Psychiatric: PT has a normal mood and affect. Pt behavior is normal. Judgment and thought content normal.          Assessment/Plan:     1. Bronchitis    - albuterol 108 (90 Base) MCG/ACT Aero Soln inhalation aerosol; Inhale 2 Puffs by mouth every 6 hours as needed for Shortness of Breath.  Dispense: 8.5 g; Refill: 1  - predniSONE (DELTASONE) 20 MG Tab; Take 3 tabs at once PO daily x 5 days, then take 2 tabs at once daily x 3 days, then take 1 tab PO daily x 2 days  Dispense: 23 Tab; Refill: 0  - doxycycline (VIBRAMYCIN) 100 MG Tab; Take 1 Tab by mouth 2 times a day for 7 days.  Dispense: 14 Tab; Refill: 0  - promethazine-codeine (PHENERGAN-CODEINE) 6.25-10 MG/5ML Syrup; Take 5 mL by mouth 4 times a day as needed for Cough for up to 14 days.  Dispense: 240 mL; Refill: 0    2. Acute pharyngitis, unspecified etiology    - albuterol 108 (90 Base) MCG/ACT Aero Soln inhalation aerosol; Inhale 2 Puffs by mouth every 6 hours as needed for Shortness of Breath.  Dispense: 8.5 g; Refill: 1  - predniSONE (DELTASONE) 20 MG Tab; Take 3 tabs at once PO daily x 5 days, then take 2 tabs at once daily x 3 days, then take 1 tab PO daily x 2 days  Dispense: 23 Tab; Refill: 0    3. URI with cough and congestion    - albuterol 108 (90 Base) MCG/ACT Aero Soln inhalation aerosol; Inhale 2 Puffs by mouth every 6 hours as needed for Shortness of Breath.  Dispense: 8.5 g; Refill: 1  - predniSONE (DELTASONE) 20 MG Tab; Take 3 tabs at once PO daily x 5 days, then take 2 tabs at once daily x 3 days, then take 1 tab PO daily x 2 days  Dispense: 23 Tab; Refill: 0  - promethazine-codeine (PHENERGAN-CODEINE)  6.25-10 MG/5ML Syrup; Take 5 mL by mouth 4 times a day as needed for Cough for up to 14 days.  Dispense: 240 mL; Refill: 0    4. Obesity, morbid, BMI 40.0-49.9 ([facility])    - Patient identified as having weight management issue.  Appropriate orders and counseling given.    Rest, fluids encouraged.  OTC decongestant for congestion/cough  AVS with medical info given.  Pt was in full understanding and agreement with the plan.  Follow-up as needed if symptoms worsen or fail to improve.

## 2018-07-11 ENCOUNTER — OFFICE VISIT (OUTPATIENT)
Dept: URGENT CARE | Facility: PHYSICIAN GROUP | Age: 53
End: 2018-07-11
Payer: COMMERCIAL

## 2018-07-11 ENCOUNTER — APPOINTMENT (OUTPATIENT)
Dept: RADIOLOGY | Facility: IMAGING CENTER | Age: 53
End: 2018-07-11
Attending: PHYSICIAN ASSISTANT
Payer: COMMERCIAL

## 2018-07-11 VITALS
TEMPERATURE: 98.1 F | HEART RATE: 84 BPM | WEIGHT: 224 LBS | RESPIRATION RATE: 18 BRPM | BODY MASS INDEX: 42.29 KG/M2 | DIASTOLIC BLOOD PRESSURE: 72 MMHG | OXYGEN SATURATION: 94 % | HEIGHT: 61 IN | SYSTOLIC BLOOD PRESSURE: 126 MMHG

## 2018-07-11 DIAGNOSIS — R05.9 COUGH: ICD-10-CM

## 2018-07-11 DIAGNOSIS — R07.89 CHEST TIGHTNESS: ICD-10-CM

## 2018-07-11 DIAGNOSIS — J40 BRONCHITIS: ICD-10-CM

## 2018-07-11 DIAGNOSIS — Z87.09 HISTORY OF COPD: ICD-10-CM

## 2018-07-11 PROCEDURE — 71046 X-RAY EXAM CHEST 2 VIEWS: CPT | Mod: TC | Performed by: PHYSICIAN ASSISTANT

## 2018-07-11 PROCEDURE — 99214 OFFICE O/P EST MOD 30 MIN: CPT | Performed by: PHYSICIAN ASSISTANT

## 2018-07-11 RX ORDER — IPRATROPIUM BROMIDE AND ALBUTEROL SULFATE 2.5; .5 MG/3ML; MG/3ML
3 SOLUTION RESPIRATORY (INHALATION) EVERY 6 HOURS PRN
Qty: 30 BULLET | Refills: 0 | Status: SHIPPED | OUTPATIENT
Start: 2018-07-11 | End: 2018-08-14

## 2018-07-11 ASSESSMENT — ENCOUNTER SYMPTOMS
DIZZINESS: 0
SPUTUM PRODUCTION: 1
TINGLING: 0
VOMITING: 0
MYALGIAS: 1
HEADACHES: 0
EYE DISCHARGE: 0
EYE REDNESS: 0
COUGH: 1
ABDOMINAL PAIN: 0
SORE THROAT: 0
SHORTNESS OF BREATH: 0
FEVER: 0
NECK PAIN: 0
WHEEZING: 1
RHINORRHEA: 1
DIARRHEA: 0
CHILLS: 0

## 2018-07-11 ASSESSMENT — COPD QUESTIONNAIRES: COPD: 1

## 2018-07-11 NOTE — PROGRESS NOTES
"Subjective:      Sharmin Cardozo is a 52 y.o. female who presents with Bronchitis (no strength, hard time breathing, not getting better from 7/1/18)          Patient is a 52-year-old female who returns today for recheck after originally being evaluated on 7/1.  During that time patient was diagnosed with bronchitis and was started on doxycycline, prednisone taper and given codeine with promethazine.  Patient reports continued cough, wheezing and chest tightness.  Patient does not feel significantly improved after the course of antibiotic or the steroid.  Patient does report prior history of asthma versus COPD which patient has been utilizing her nebulizer treatment at home/daily.  Patient denies any fevers but does report nighttime chills.  She denies any other ill contacts or prior history of pneumonia.Pt reports increased fatigue and in general \"not feeling well\" the last few days.         Cough   This is a new problem. The current episode started 1 to 4 weeks ago. The problem has been unchanged. The problem occurs every few minutes. The cough is productive of sputum. Associated symptoms include ear congestion, myalgias, nasal congestion, postnasal drip, rhinorrhea and wheezing. Pertinent negatives include no chills, ear pain, eye redness, fever, headaches, rash, sore throat or shortness of breath. Associated symptoms comments: Pos for chest tightness  . Nothing aggravates the symptoms. Treatments tried: As above.  Her past medical history is significant for asthma, bronchitis and COPD. There is no history of environmental allergies or pneumonia.       Review of Systems   Constitutional: Positive for malaise/fatigue. Negative for chills and fever.   HENT: Positive for congestion, postnasal drip and rhinorrhea. Negative for ear discharge, ear pain and sore throat.    Eyes: Negative for discharge and redness.   Respiratory: Positive for cough, sputum production and wheezing. Negative for shortness of breath.  " "  Cardiovascular: Negative for leg swelling.   Gastrointestinal: Negative for abdominal pain, diarrhea and vomiting.   Genitourinary: Negative for dysuria and urgency.   Musculoskeletal: Positive for myalgias. Negative for neck pain.   Skin: Negative for itching and rash.   Neurological: Negative for dizziness, tingling and headaches.   Endo/Heme/Allergies: Negative for environmental allergies.   All other systems reviewed and are negative.         Objective:     /72   Pulse 84   Temp 36.7 °C (98.1 °F)   Resp 18   Ht 1.549 m (5' 1\")   Wt 101.6 kg (224 lb)   LMP 04/17/2008   SpO2 94%   BMI 42.32 kg/m²    PMH:  has a past medical history of ASTHMA; Hypertension; and Physiological ovarian cysts.  MEDS:   Current Outpatient Prescriptions:   •  ipratropium-albuterol (DUONEB) 0.5-2.5 (3) MG/3ML nebulizer solution, 3 mL by Nebulization route every 6 hours as needed for Shortness of Breath., Disp: 30 Bullet, Rfl: 0  •  albuterol 108 (90 Base) MCG/ACT Aero Soln inhalation aerosol, Inhale 2 Puffs by mouth every 6 hours as needed for Shortness of Breath., Disp: 8.5 g, Rfl: 1  •  amlodipine (NORVASC) 10 MG Tab, Take 1 Tab by mouth every day., Disp: 90 Tab, Rfl: 1  •  bisacodyl (DUCODYL) 5 MG EC tablet, Take 2 Tabs by mouth every day., Disp: 30 Tab, Rfl: 0  •  aspirin EC (ECOTRIN) 81 MG Tablet Delayed Response, Take 81 mg by mouth every day., Disp: , Rfl:   •  Multiple Vitamin (DAILY VITAMIN PO), Take 1 Tab by mouth every day., Disp: , Rfl:   •  predniSONE (DELTASONE) 20 MG Tab, Take 3 tabs at once PO daily x 5 days, then take 2 tabs at once daily x 3 days, then take 1 tab PO daily x 2 days, Disp: 23 Tab, Rfl: 0  •  promethazine-codeine (PHENERGAN-CODEINE) 6.25-10 MG/5ML Syrup, Take 5 mL by mouth 4 times a day as needed for Cough for up to 14 days., Disp: 240 mL, Rfl: 0  •  ipratropium-albuterol (DUONEB) 0.5-2.5 (3) MG/3ML nebulizer solution, 3 mL by Nebulization route every four hours as needed for Shortness of " Breath (bronchospasm)., Disp: 75 mL, Rfl: 0  ALLERGIES:   Allergies   Allergen Reactions   • Bactrim Rash   • Iodine Nausea   • Tetanus Toxoid Rash and Swelling     Rash & swelling from tetanus vaccine      SURGHX:   Past Surgical History:   Procedure Laterality Date   • CYSTOSCOPY  3/24/2009    Performed by ZORAN BRIGGS at [facility]   • VAGINAL HYSTERECTOMY SCOPE TOTAL  3/24/2009    Performed by ZORAN BRIGGS at [facility]   • CHOLECYSTECTOMY     • OTHER      left elbow   • PB VAG DELIV ONLY,PBEV C-SECTN      x 4   • TUBAL LIGATION       SOCHX:  reports that she has been smoking Cigarettes.  She has been smoking about 0.25 packs per day. She has never used smokeless tobacco. She reports that she does not drink alcohol or use drugs.  FH: Family history was reviewed, no pertinent findings to report    Physical Exam   Constitutional: She is oriented to person, place, and time. She appears well-developed and well-nourished.   HENT:   Head: Normocephalic and atraumatic.   Mouth/Throat: No oropharyngeal exudate.   Ears- Canals clear- TM- with clear fluid effusions bilaterally.   Pos. PND, with slight erythema- without tonsillar edema or exudate.   Mild discharge noted bilaterally- to nares.      Eyes: EOM are normal. Pupils are equal, round, and reactive to light.   Neck: Normal range of motion. Neck supple.   Cardiovascular: Normal rate and regular rhythm.    No murmur heard.  Pulmonary/Chest: Effort normal and breath sounds normal. No respiratory distress.   Musculoskeletal: Normal range of motion. She exhibits no tenderness.   Lymphadenopathy:     She has no cervical adenopathy.   Neurological: She is alert and oriented to person, place, and time.   Skin: Skin is warm. No rash noted.   Psychiatric: She has a normal mood and affect. Her behavior is normal.   Vitals reviewed.              Assessment/Plan:     1. Bronchitis  - ipratropium-albuterol (DUONEB) 0.5-2.5 (3)  MG/3ML nebulizer solution; 3 mL by Nebulization route every 6 hours as needed for Shortness of Breath.  Dispense: 30 Bullet; Refill: 0    2. Cough  - DX-CHEST-2 VIEWS; Future  - ipratropium-albuterol (DUONEB) 0.5-2.5 (3) MG/3ML nebulizer solution; 3 mL by Nebulization route every 6 hours as needed for Shortness of Breath.  Dispense: 30 Bullet; Refill: 0    3. Chest tightness  - EKG    4. History of COPD    EKG: NSR at a rate of 62, Inverted T wave in lead III, Without other acute ST changes at this time-  Compared to old- 1/28/18- same.     CXR: no acute cardiopulmonary process- I have reviewed the xray and agree with the official read.     At this time patient is without indication of pneumonia clinically nor on x-ray.  Patient would have been adequately treated for bacterial bronchitis with recent course of doxycycline of which the patient did not have significant improvement with.  Most likely etiology is viral in nature.  Strongly encouraged patient to utilize nebulizer when needed refill on doing with this written today.  Encouraged OTC supportive meds PRN. Humidification, increase fluids, avoid night time dairy.   Patient given precautionary s/sx that mandate immediate follow up and evaluation in the ED. Advised of risks of not doing so.    DDX, Supportive care, and indications for immediate follow-up discussed with patient.    Instructed to return to clinic or nearest emergency department if we are not available for any change in condition, further concerns, or worsening of symptoms.    The patient demonstrated a good understanding and agreed with the treatment plan.    Please note that this dictation was created using voice recognition software. I have made every reasonable attempt to correct obvious errors, but I expect that there are errors of grammar and possibly content that I did not discover before finalizing the note.

## 2018-07-21 ENCOUNTER — OFFICE VISIT (OUTPATIENT)
Dept: URGENT CARE | Facility: PHYSICIAN GROUP | Age: 53
End: 2018-07-21
Payer: COMMERCIAL

## 2018-07-21 VITALS
WEIGHT: 224 LBS | HEIGHT: 61 IN | TEMPERATURE: 97.6 F | OXYGEN SATURATION: 93 % | BODY MASS INDEX: 42.29 KG/M2 | DIASTOLIC BLOOD PRESSURE: 110 MMHG | HEART RATE: 86 BPM | SYSTOLIC BLOOD PRESSURE: 152 MMHG

## 2018-07-21 DIAGNOSIS — R03.0 ELEVATED BLOOD PRESSURE READING: ICD-10-CM

## 2018-07-21 DIAGNOSIS — R60.0 LOWER LEG EDEMA: ICD-10-CM

## 2018-07-21 DIAGNOSIS — M79.662 PAIN IN LEFT LOWER LEG: ICD-10-CM

## 2018-07-21 PROCEDURE — 99213 OFFICE O/P EST LOW 20 MIN: CPT | Performed by: NURSE PRACTITIONER

## 2018-07-21 ASSESSMENT — ENCOUNTER SYMPTOMS
LEG SWELLING: 1
COUGH: 0
FATIGUE: 0

## 2018-07-21 NOTE — PROGRESS NOTES
Subjective:      Sharmin Cardozo is a 52 y.o. female who presents with Leg Swelling (sore/tightness  x 1 day, pain in ankle, lower leg, knee)            Leg Swelling   This is a new problem. Episode onset: pt reports left lower leg swelling and pain that developed last night. She admits she thinks her BP is elevated today due to pain. She has not taken anything for pain today. The problem occurs intermittently. The problem has been unchanged. Pertinent negatives include no chest pain, coughing or fatigue. Exacerbated by: she admits the swelling and pain is the worst at the end of the day after working all day. She has tried ice (elevating legs and OTC icy hot last night) for the symptoms. The treatment provided mild relief.       Review of Systems   Constitutional: Negative for fatigue.   Respiratory: Negative for cough.    Cardiovascular: Positive for leg swelling. Negative for chest pain.   All other systems reviewed and are negative.    Past Medical History:   Diagnosis Date   • ASTHMA    • Hypertension    • Physiological ovarian cysts       Past Surgical History:   Procedure Laterality Date   • CYSTOSCOPY  3/24/2009    Performed by ZORAN BRIGGS at [facility]   • VAGINAL HYSTERECTOMY SCOPE TOTAL  3/24/2009    Performed by ZORAN BRIGGS at [facility]   • CHOLECYSTECTOMY     • OTHER      left elbow   • PB VAG DELIV ONLY,PBEV C-SECTN      x 4   • TUBAL LIGATION        Social History     Social History   • Marital status:      Spouse name: N/A   • Number of children: 4   • Years of education: N/A     Occupational History   •  [employer] [place] [address]     Social History Main Topics   • Smoking status: Current Every Day Smoker     Packs/day: 0.25     Types: Cigarettes     Last attempt to quit: 2/2/2017   • Smokeless tobacco: Never Used      Comment: 3-4/day   • Alcohol use No      Comment: 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 Vital signs are normal. She appears well-developed and well-nourished.   HENT:   Head: Normocephalic and atraumatic.   Eyes: EOM are normal. Pupils are equal, round, and reactive to light.   Neck: Normal range of motion.   Cardiovascular: Normal rate and regular rhythm.    Pulmonary/Chest: Effort normal.   Musculoskeletal: Normal range of motion.        Left lower leg: She exhibits tenderness and edema.        Legs:  Neurological: She is alert and oriented to person, place, and time.   Skin: Skin is warm and dry. Capillary refill takes less than 2 seconds.   Psychiatric: She has a normal mood and affect. Her speech is normal and behavior is normal. Thought content normal.   Vitals reviewed.              Assessment/Plan:     1. Lower leg edema    2. Pain in left lower leg    3. Elevated blood pressure reading    Discussed with patient there is low concern for a DVT at this time as she has not had any recent surgeries, sitting for extended period of time, or known clotting disorder  She may need to have her BP medication altered but pt states she has a PCP appt in 2 weeks and will talk to her PCP about this  I have recommended compression stockings up to her knees to wear daily, tylenol and ibuprofen as needed for pain, elevate legs throughout day and at night to help relieve swelling  She is strictly advised to seek further medical treatment for significant worsening of swelling to legs, uncontrolled pain or new onset of cough or CP, she understands  Supportive care, differential diagnoses, and indications for immediate follow-up discussed " with patient.    Pathogenesis of diagnosis discussed including typical length and natural progression.      Instructed to return to UC or nearest emergency department if symptoms fail to improve, for any change in condition, further concerns, or new concerning symptoms.  Patient states understanding of the plan of care and discharge instructions.

## 2018-08-14 ENCOUNTER — OFFICE VISIT (OUTPATIENT)
Dept: MEDICAL GROUP | Facility: PHYSICIAN GROUP | Age: 53
End: 2018-08-14
Payer: COMMERCIAL

## 2018-08-14 VITALS
HEART RATE: 68 BPM | BODY MASS INDEX: 43.99 KG/M2 | DIASTOLIC BLOOD PRESSURE: 100 MMHG | OXYGEN SATURATION: 95 % | WEIGHT: 233 LBS | SYSTOLIC BLOOD PRESSURE: 166 MMHG | TEMPERATURE: 97.8 F | HEIGHT: 61 IN

## 2018-08-14 DIAGNOSIS — J44.89 COPD WITH ASTHMA: ICD-10-CM

## 2018-08-14 DIAGNOSIS — E66.01 MORBID OBESITY WITH BMI OF 40.0-44.9, ADULT (HCC): ICD-10-CM

## 2018-08-14 DIAGNOSIS — E78.5 DYSLIPIDEMIA: ICD-10-CM

## 2018-08-14 DIAGNOSIS — F17.210 NICOTINE DEPENDENCE, CIGARETTES, UNCOMPLICATED: ICD-10-CM

## 2018-08-14 DIAGNOSIS — R60.0 BILATERAL LOWER EXTREMITY EDEMA: ICD-10-CM

## 2018-08-14 DIAGNOSIS — R23.3 PETECHIAE: ICD-10-CM

## 2018-08-14 DIAGNOSIS — I10 ESSENTIAL HYPERTENSION: ICD-10-CM

## 2018-08-14 PROBLEM — Z82.49 FAMILY HISTORY OF CORONARY ARTERY DISEASE: Status: RESOLVED | Noted: 2017-02-08 | Resolved: 2018-08-14

## 2018-08-14 PROBLEM — R73.09 ELEVATED HEMOGLOBIN A1C: Status: RESOLVED | Noted: 2017-03-01 | Resolved: 2018-08-14

## 2018-08-14 PROBLEM — R07.9 CHEST PAIN: Status: RESOLVED | Noted: 2017-02-02 | Resolved: 2018-08-14

## 2018-08-14 PROBLEM — E66.9 OBESITY (BMI 30-39.9): Status: RESOLVED | Noted: 2017-01-04 | Resolved: 2018-08-14

## 2018-08-14 PROCEDURE — 99214 OFFICE O/P EST MOD 30 MIN: CPT | Performed by: PHYSICIAN ASSISTANT

## 2018-08-14 RX ORDER — TELMISARTAN 40 MG/1
40 TABLET ORAL DAILY
Qty: 30 TAB | Refills: 1 | Status: SHIPPED | OUTPATIENT
Start: 2018-08-14 | End: 2018-09-11

## 2018-08-15 ENCOUNTER — HOSPITAL ENCOUNTER (OUTPATIENT)
Dept: LAB | Facility: MEDICAL CENTER | Age: 53
End: 2018-08-15
Attending: PHYSICIAN ASSISTANT
Payer: COMMERCIAL

## 2018-08-15 DIAGNOSIS — E78.5 DYSLIPIDEMIA: ICD-10-CM

## 2018-08-15 DIAGNOSIS — I10 ESSENTIAL HYPERTENSION: ICD-10-CM

## 2018-08-15 DIAGNOSIS — R60.0 BILATERAL LOWER EXTREMITY EDEMA: ICD-10-CM

## 2018-08-15 DIAGNOSIS — R23.3 PETECHIAE: ICD-10-CM

## 2018-08-15 LAB
ALBUMIN SERPL BCP-MCNC: 4.3 G/DL (ref 3.2–4.9)
ALBUMIN/GLOB SERPL: 1.5 G/DL
ALP SERPL-CCNC: 80 U/L (ref 30–99)
ALT SERPL-CCNC: 18 U/L (ref 2–50)
ANION GAP SERPL CALC-SCNC: 8 MMOL/L (ref 0–11.9)
AST SERPL-CCNC: 14 U/L (ref 12–45)
BASOPHILS # BLD AUTO: 0.2 % (ref 0–1.8)
BASOPHILS # BLD: 0.02 K/UL (ref 0–0.12)
BILIRUB SERPL-MCNC: 0.4 MG/DL (ref 0.1–1.5)
BUN SERPL-MCNC: 19 MG/DL (ref 8–22)
CALCIUM SERPL-MCNC: 9.2 MG/DL (ref 8.5–10.5)
CHLORIDE SERPL-SCNC: 106 MMOL/L (ref 96–112)
CHOLEST SERPL-MCNC: 190 MG/DL (ref 100–199)
CO2 SERPL-SCNC: 26 MMOL/L (ref 20–33)
CREAT SERPL-MCNC: 0.69 MG/DL (ref 0.5–1.4)
EOSINOPHIL # BLD AUTO: 0.16 K/UL (ref 0–0.51)
EOSINOPHIL NFR BLD: 1.7 % (ref 0–6.9)
ERYTHROCYTE [DISTWIDTH] IN BLOOD BY AUTOMATED COUNT: 47.2 FL (ref 35.9–50)
GLOBULIN SER CALC-MCNC: 2.8 G/DL (ref 1.9–3.5)
GLUCOSE SERPL-MCNC: 91 MG/DL (ref 65–99)
HCT VFR BLD AUTO: 49.1 % (ref 37–47)
HDLC SERPL-MCNC: 61 MG/DL
HGB BLD-MCNC: 16.1 G/DL (ref 12–16)
IMM GRANULOCYTES # BLD AUTO: 0.03 K/UL (ref 0–0.11)
IMM GRANULOCYTES NFR BLD AUTO: 0.3 % (ref 0–0.9)
LDLC SERPL CALC-MCNC: 104 MG/DL
LYMPHOCYTES # BLD AUTO: 3.83 K/UL (ref 1–4.8)
LYMPHOCYTES NFR BLD: 40.1 % (ref 22–41)
MCH RBC QN AUTO: 32 PG (ref 27–33)
MCHC RBC AUTO-ENTMCNC: 32.8 G/DL (ref 33.6–35)
MCV RBC AUTO: 97.6 FL (ref 81.4–97.8)
MONOCYTES # BLD AUTO: 0.48 K/UL (ref 0–0.85)
MONOCYTES NFR BLD AUTO: 5 % (ref 0–13.4)
NEUTROPHILS # BLD AUTO: 5.02 K/UL (ref 2–7.15)
NEUTROPHILS NFR BLD: 52.7 % (ref 44–72)
NRBC # BLD AUTO: 0 K/UL
NRBC BLD-RTO: 0 /100 WBC
PLATELET # BLD AUTO: 229 K/UL (ref 164–446)
PMV BLD AUTO: 10.8 FL (ref 9–12.9)
POTASSIUM SERPL-SCNC: 3.8 MMOL/L (ref 3.6–5.5)
PROT SERPL-MCNC: 7.1 G/DL (ref 6–8.2)
RBC # BLD AUTO: 5.03 M/UL (ref 4.2–5.4)
SODIUM SERPL-SCNC: 140 MMOL/L (ref 135–145)
TRIGL SERPL-MCNC: 126 MG/DL (ref 0–149)
WBC # BLD AUTO: 9.5 K/UL (ref 4.8–10.8)

## 2018-08-15 PROCEDURE — 36415 COLL VENOUS BLD VENIPUNCTURE: CPT

## 2018-08-15 PROCEDURE — 80053 COMPREHEN METABOLIC PANEL: CPT

## 2018-08-15 PROCEDURE — 85025 COMPLETE CBC W/AUTO DIFF WBC: CPT

## 2018-08-15 PROCEDURE — 80061 LIPID PANEL: CPT
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
## 2018-08-22 ENCOUNTER — TELEPHONE (OUTPATIENT)
Dept: MEDICAL GROUP | Facility: PHYSICIAN GROUP | Age: 53
End: 2018-08-22

## 2018-08-22 NOTE — TELEPHONE ENCOUNTER
Patient's states that ultrasound is too expensive, insurance says this would be $1,100 out of pocket for that procedure. She would like to know if there are any less expensive options, otherwise she will just have to put off getting the ultrasound until she can afford it.

## 2018-08-23 NOTE — TELEPHONE ENCOUNTER
Please advise patient that the ultrasound was ordered to evaluate for vein problems that could be contributing to her leg swelling. If she can't afford, that's fine. I recommend the use of compression stockings to help with swelling as discussed at her recent appointment. If she is interested in referral to one of the local vein clinics for evaluation/treatment, that option is available as well. She may have better luck getting imaging covered if ordered by a specialist. If she is interested, please let me know and I'll order referral.  Xochilt Ricardo P.A.-C.

## 2018-08-28 ENCOUNTER — APPOINTMENT (OUTPATIENT)
Dept: RADIOLOGY | Facility: MEDICAL CENTER | Age: 53
End: 2018-08-28
Attending: PHYSICIAN ASSISTANT
Payer: COMMERCIAL

## 2018-09-10 ENCOUNTER — TELEPHONE (OUTPATIENT)
Dept: MEDICAL GROUP | Facility: PHYSICIAN GROUP | Age: 53
End: 2018-09-10

## 2018-09-10 NOTE — TELEPHONE ENCOUNTER
Future Appointments       Provider Department Center    9/11/2018 8:25 AM Xochilt Ricardo P.A.-C. [facility]        ESTABLISHED PATIENT PRE-VISIT PLANNING     Note: Patient will not be contacted if there is no indication to call.     1.  Reviewed notes from the last few office visits within the medical group: Yes    2.  If any orders were placed at last visit or intended to be done for this visit (i.e. 6 mos follow-up), do we have Results/Consult Notes?        •  Labs - Labs ordered, completed on 08/15/18 and results are in chart.       •  Imaging - Imaging ordered, NOT completed. Patient advised to complete prior to next appointment.       •  Referrals - Referral ordered, patient has NOT been seen.    3. Is this appointment scheduled as a Hospital Follow-Up? No    4.  Immunizations were updated in [employer] using [employer]?: Yes       •  [employer] Recommendations: FLU, MMR , TD, VARICELLA (Chicken Pox)  and ZOSTAVAX (Shingles)    5.  Patient is due for the following Health Maintenance Topics:   Health Maintenance Due   Topic Date Due   • PFT SCREENING 08/05/1985   • MAMMOGRAM  08/05/2007   • COLONOSCOPY  08/05/2017   • IMM ZOSTER VACCINES (1 of 2) 08/05/2017   • IMM INFLUENZA (1) 09/01/2018       6.  MDX printed for Provider? NO [employer]     7.  Patient was informed to arrive 15 min prior to their scheduled appointment and bring in their medication bottles. Confirmed through automated call

## 2018-09-11 ENCOUNTER — OFFICE VISIT (OUTPATIENT)
Dept: MEDICAL GROUP | Facility: PHYSICIAN GROUP | Age: 53
End: 2018-09-11
Payer: COMMERCIAL

## 2018-09-11 VITALS
HEART RATE: 64 BPM | TEMPERATURE: 98.3 F | DIASTOLIC BLOOD PRESSURE: 90 MMHG | BODY MASS INDEX: 43.23 KG/M2 | HEIGHT: 61 IN | OXYGEN SATURATION: 96 % | WEIGHT: 229 LBS | SYSTOLIC BLOOD PRESSURE: 146 MMHG

## 2018-09-11 DIAGNOSIS — Z12.11 SCREENING FOR COLORECTAL CANCER: ICD-10-CM

## 2018-09-11 DIAGNOSIS — Z12.12 SCREENING FOR COLORECTAL CANCER: ICD-10-CM

## 2018-09-11 DIAGNOSIS — R60.0 BILATERAL LOWER EXTREMITY EDEMA: ICD-10-CM

## 2018-09-11 DIAGNOSIS — I10 ESSENTIAL HYPERTENSION: ICD-10-CM

## 2018-09-11 PROCEDURE — 99214 OFFICE O/P EST MOD 30 MIN: CPT | Performed by: PHYSICIAN ASSISTANT

## 2018-09-11 RX ORDER — TELMISARTAN AND HYDROCHLORTHIAZIDE 80; 12.5 MG/1; MG/1
1 TABLET ORAL DAILY
Qty: 30 TAB | Refills: 3 | Status: SHIPPED | OUTPATIENT
Start: 2018-09-11 | End: 2019-01-09 | Stop reason: SDUPTHER

## 2018-09-11 RX ORDER — AMLODIPINE BESYLATE 10 MG/1
10 TABLET ORAL DAILY
Qty: 90 TAB | Refills: 1 | Status: SHIPPED | OUTPATIENT
Start: 2018-09-11 | End: 2019-01-09 | Stop reason: SDUPTHER

## 2018-09-11 NOTE — ASSESSMENT & PLAN NOTE
Patient here today for follow-up on her blood pressure. She has been checking fairly consistently since her last visit with me last month. Unfortunately, blood pressures have remained elevated at home. She's had some significantly high readings of greater than 190 's systolic over 90-100s diastolic. She does state that she has been taking both of her blood pressure medications. Is on amlodipine 10 mg daily and most recently as of the last appointment, is also on telmisartan 40 mg daily. She has been getting some headaches when her blood pressure gets high and also describes a few episodes of visual changes. She describes this as having difficulty focusing when looking at something up close such as looking at her phone. She states that the words will seem to be moving when she knows they aren't. She denies any chest pressure/pain, palpitations, or dizziness.

## 2018-09-11 NOTE — ASSESSMENT & PLAN NOTE
Improved from last visit. Patient has been wearing compression stockings. Still slightly worse on the left side. Was unable to get the ultrasound done that I ordered for her due to cost reasons. She states that her insurance won't cover.

## 2018-09-11 NOTE — PROGRESS NOTES
Subjective:   Sharmin Cardozo is a 53 y.o. female here today for follow-up hypertension, LE edema. Is an established patient of mine.    HPI:    Essential hypertension  Patient here today for follow-up on her blood pressure. She has been checking fairly consistently since her last visit with me last month. Unfortunately, blood pressures have remained elevated at home. She's had some significantly high readings of greater than 190 's systolic over 90-100s diastolic. She does state that she has been taking both of her blood pressure medications. Is on amlodipine 10 mg daily and most recently as of the last appointment, is also on telmisartan 40 mg daily. She has been getting some headaches when her blood pressure gets high and also describes a few episodes of visual changes. She describes this as having difficulty focusing when looking at something up close such as looking at her phone. She states that the words will seem to be moving when she knows they aren't. She denies any chest pressure/pain, palpitations, or dizziness.    Bilateral lower extremity edema  Improved from last visit. Patient has been wearing compression stockings. Still slightly worse on the left side. Was unable to get the ultrasound done that I ordered for her due to cost reasons. She states that her insurance won't cover.       Current medicines (including changes today)  Current Outpatient Prescriptions   Medication Sig Dispense Refill   • telmisartan-hydrochlorothiazide (MICARDIS HCT) 80-12.5 MG per tablet Take 1 Tab by mouth every day. 30 Tab 3   • amLODIPine (NORVASC) 10 MG Tab Take 1 Tab by mouth every day. 90 Tab 1   • aspirin EC (ECOTRIN) 81 MG Tablet Delayed Response Take 81 mg by mouth every day.     • Multiple Vitamin (DAILY VITAMIN PO) Take 1 Tab by mouth every day.     • albuterol 108 (90 Base) MCG/ACT Aero Soln inhalation aerosol Inhale 2 Puffs by mouth every 6 hours as needed for Shortness of Breath. 8.5 g 1   •  "ipratropium-albuterol (DUONEB) 0.5-2.5 (3) MG/3ML nebulizer solution 3 mL by Nebulization route every four hours as needed for Shortness of Breath (bronchospasm). 75 mL 0     No current facility-administered medications for this visit.      She  has a past medical history of ASTHMA; Hypertension; and Physiological ovarian cysts.    ROS  As per HPI.       Objective:     Blood pressure 146/90, pulse 64, temperature 36.8 °C (98.3 °F), height 1.549 m (5' 1\"), weight 103.9 kg (229 lb), last menstrual period 04/17/2008, SpO2 96 %. Body mass index is 43.27 kg/m².     Physical Exam:  Constitutional: Alert, well-appearing, no distress.  Skin: Warm, dry, good turgor, no rashes in visible areas.  Eye: Pupils are equal and round, conjunctiva clear, lids normal.  ENMT: Lips without lesions, moist mucus membranes.  Respiratory: Unlabored respiratory effort, lungs clear to auscultation, no wheezes, no rhonchi.  Cardiovascular: Normal S1, S2, no murmur, trace lower extremity edema of bilateral lower extremities, L slightly > R.      Assessment and Plan:   The following treatment plan was discussed    1. Essential hypertension  Chronic issue, still uncontrolled. Blood pressure today in the office is 146/90 and has had significantly elevated readings at home. Explained to patient that with the systolic greater than 180 and/or diastolic greater than 1:15, should go to the ER for evaluation, especially if high blood pressure is associated with any symptoms. I am going to adjust her medication today. Should continue the amlodipine 10 mg daily. Will switch to telmisartan-hydrochlorothiazide combination 80-12.5 mg daily. Continue logging home blood pressure readings and follow up again in one month.  - telmisartan-hydrochlorothiazide (MICARDIS HCT) 80-12.5 MG per tablet; Take 1 Tab by mouth every day.  Dispense: 30 Tab; Refill: 3  - amLODIPine (NORVASC) 10 MG Tab; Take 1 Tab by mouth every day.  Dispense: 90 Tab; Refill: 1    2. Bilateral " lower extremity edema  Established problem, improved. Minimal edema on exam. Likely related to weight and/or venous insufficiency. Explained to patient that the hydrochlorothiazide should help somewhat with the edema as well. Should also continue wearing compression stockings.    3. Screening for colorectal cancer  Patient declines screening colonoscopy but is willing to do FIT.  - OCCULT BLOOD FECES IMMUNOASSAY (FIT); Future      Followup: Return in about 1 month (around 10/11/2018) for f/u HTN; Short.    Xochilt Ricardo P.A.-C.

## 2018-09-11 NOTE — PATIENT INSTRUCTIONS
Hydrochlorothiazide, HCTZ; Telmisartan tablets  What is this medicine?  TELMISARTAN; HYDROCHLOROTHIAZIDE (tel mi DAYNA deshpande; reese droe klor oh THYE a zide) is a combination of a drug that relaxes blood vessels and a diuretic. It is used to treat high blood pressure.  This medicine may be used for other purposes; ask your health care provider or pharmacist if you have questions.  COMMON BRAND NAME(S): Micardis HCT  What should I tell my health care provider before I take this medicine?  They need to know if you have any of these conditions:  -decreased urine  -if you are on a special diet, like a low-salt diet  -immune system problems, like lupus  -kidney disease  -liver disease  -an unusual or allergic reaction to telmisartan, hydrochlorothiazide, sulfa drugs, other medicines, foods, dyes, or preservatives  -pregnant or trying to get pregnant  -breast-feeding  How should I use this medicine?  Take this medicine by mouth with a drink of water. Follow the directions on the prescription label. You can take it with or without food. If it upsets your stomach, take it with food. Take your medicine at regular intervals. Do not take it more often than directed. Do not stop taking except on your doctor's advice.  Talk to your pediatrician regarding the use of this medicine in children. Special care may be needed.  Overdosage: If you think you have taken too much of this medicine contact a poison control center or emergency room at once.  NOTE: This medicine is only for you. Do not share this medicine with others.  What if I miss a dose?  If you miss a dose, take it as soon as you can. If it is almost time for your next dose, take only that dose. Do not take double or extra doses.  What may interact with this medicine?  -barbiturates, like phenobarbital  -blood pressure medicines  -corticosteroids  -diabetic medicines  -digoxin  -diuretics, especially triamterene, spironolactone or amiloride  -lithium  -NSAIDs, medicines for pain  and inflammation, like ibuprofen or naproxen  -potassium salts or potassium supplements  -prescription pain medicines  -skeletal muscle relaxants like tubocurarine  -some cholesterol lowering medicines like cholestyramine or colestipol  -warfarin  This list may not describe all possible interactions. Give your health care provider a list of all the medicines, herbs, non-prescription drugs, or dietary supplements you use. Also tell them if you smoke, drink alcohol, or use illegal drugs. Some items may interact with your medicine.  What should I watch for while using this medicine?  Check your blood pressure regularly while you are taking this medicine. Ask your doctor or health care professional what your blood pressure should be and when you should contact him or her. When you check your blood pressure, write down the measurements to show your doctor or health care professional. If you are taking this medicine for a long time, you must visit your health care professional for regular checks on your progress. Make sure you schedule appointments on a regular basis.  You must not get dehydrated. Ask your doctor or health care professional how much fluid you need to drink a day. Check with him or her if you get an attack of severe diarrhea, nausea and vomiting, or if you sweat a lot. The loss of too much body fluid can make it dangerous for you to take this medicine.  Women should inform their doctor if they wish to become pregnant or think they might be pregnant. There is a potential for serious side effects to an unborn child, particularly in the second or third trimester. Talk to your health care professional or pharmacist for more information.  You may get drowsy or dizzy. Do not drive, use machinery, or do anything that needs mental alertness until you know how this drug affects you. Do not stand or sit up quickly, especially if you are an older patient. This reduces the risk of dizzy or fainting spells. Alcohol can  make you more drowsy and dizzy. Avoid alcoholic drinks.  This medicine may affect your blood sugar level. If you have diabetes, check with your doctor or health care professional before changing the dose of your diabetic medicine.  Avoid salt substitutes unless you are told otherwise by your doctor or health care professional.  Do not treat yourself for coughs, colds, or pain while you are taking this medicine without asking your doctor or health care professional for advice. Some ingredients may increase your blood pressure.  This medicine can make you more sensitive to the sun. Keep out of the sun. If you cannot avoid being in the sun, wear protective clothing and use sunscreen. Do not use sun lamps or tanning beds/booths.  What side effects may I notice from receiving this medicine?  Side effects that you should report to your doctor or health care professional as soon as possible:  -allergic reactions like skin rash, itching or hives, swelling of the face, lips, or tongue  -breathing problems  -changes in vision  -dark urine  -eye pain  -fast or irregular heart beat, palpitations, or chest pain  -feeling faint or lightheaded  -muscle cramps  -persistent dry cough  -redness, blistering, peeling or loosening of the skin, including inside the mouth  -stomach pain  -trouble passing urine or change in the amount of urine  -unusual bleeding or bruising  -worsened gout pain  -yellowing of the eyes or skin  Side effects that usually do not require medical attention (report to your doctor or health care professional if they continue or are bothersome):  -change in sex drive or performance  -headache  This list may not describe all possible side effects. Call your doctor for medical advice about side effects. You may report side effects to FDA at 8-054-FDA-2319.  Where should I keep my medicine?  Keep out of the reach of children.  Store at room temperature between 15 and 30 degrees C (59 and 86 degrees F). Tablets should  not be removed from the blisters until right before use. Throw away any unused medicine after the expiration date.  NOTE: This sheet is a summary. It may not cover all possible information. If you have questions about this medicine, talk to your doctor, pharmacist, or health care provider.  © 2018 [employer] (2011-09-07 14:52:10)

## 2018-09-13 ENCOUNTER — HOSPITAL ENCOUNTER (OUTPATIENT)
Facility: MEDICAL CENTER | Age: 53
End: 2018-09-14
Attending: EMERGENCY MEDICINE | Admitting: HOSPITALIST
Payer: COMMERCIAL

## 2018-09-13 ENCOUNTER — APPOINTMENT (OUTPATIENT)
Dept: RADIOLOGY | Facility: MEDICAL CENTER | Age: 53
End: 2018-09-13
Attending: EMERGENCY MEDICINE
Payer: COMMERCIAL

## 2018-09-13 DIAGNOSIS — R03.0 ELEVATED BLOOD PRESSURE READING: ICD-10-CM

## 2018-09-13 DIAGNOSIS — R07.9 ACUTE CHEST PAIN: ICD-10-CM

## 2018-09-13 PROBLEM — R07.2 PRECORDIAL PAIN: Status: ACTIVE | Noted: 2017-02-02

## 2018-09-13 PROBLEM — R55 VASOVAGAL SYNCOPE: Status: ACTIVE | Noted: 2018-09-13

## 2018-09-13 LAB
ALBUMIN SERPL BCP-MCNC: 4.3 G/DL (ref 3.2–4.9)
ALBUMIN/GLOB SERPL: 1.6 G/DL
ALP SERPL-CCNC: 80 U/L (ref 30–99)
ALT SERPL-CCNC: 17 U/L (ref 2–50)
ANION GAP SERPL CALC-SCNC: 7 MMOL/L (ref 0–11.9)
APTT PPP: 27 SEC (ref 24.7–36)
AST SERPL-CCNC: 12 U/L (ref 12–45)
BASOPHILS # BLD AUTO: 0.2 % (ref 0–1.8)
BASOPHILS # BLD: 0.02 K/UL (ref 0–0.12)
BILIRUB SERPL-MCNC: 0.3 MG/DL (ref 0.1–1.5)
BNP SERPL-MCNC: 14 PG/ML (ref 0–100)
BUN SERPL-MCNC: 18 MG/DL (ref 8–22)
CALCIUM SERPL-MCNC: 9.9 MG/DL (ref 8.5–10.5)
CHLORIDE SERPL-SCNC: 105 MMOL/L (ref 96–112)
CO2 SERPL-SCNC: 24 MMOL/L (ref 20–33)
CREAT SERPL-MCNC: 0.57 MG/DL (ref 0.5–1.4)
EOSINOPHIL # BLD AUTO: 0.07 K/UL (ref 0–0.51)
EOSINOPHIL NFR BLD: 0.8 % (ref 0–6.9)
ERYTHROCYTE [DISTWIDTH] IN BLOOD BY AUTOMATED COUNT: 44.2 FL (ref 35.9–50)
GLOBULIN SER CALC-MCNC: 2.7 G/DL (ref 1.9–3.5)
GLUCOSE SERPL-MCNC: 93 MG/DL (ref 65–99)
HCT VFR BLD AUTO: 47.5 % (ref 37–47)
HGB BLD-MCNC: 16.4 G/DL (ref 12–16)
IMM GRANULOCYTES # BLD AUTO: 0.02 K/UL (ref 0–0.11)
IMM GRANULOCYTES NFR BLD AUTO: 0.2 % (ref 0–0.9)
INR PPP: 0.89 (ref 0.87–1.13)
LV EJECT FRACT  99904: 65
LV EJECT FRACT MOD 2C 99903: 61.94
LV EJECT FRACT MOD 4C 99902: 71.58
LV EJECT FRACT MOD BP 99901: 67.62
LYMPHOCYTES # BLD AUTO: 2.82 K/UL (ref 1–4.8)
LYMPHOCYTES NFR BLD: 32.2 % (ref 22–41)
MCH RBC QN AUTO: 32.3 PG (ref 27–33)
MCHC RBC AUTO-ENTMCNC: 34.5 G/DL (ref 33.6–35)
MCV RBC AUTO: 93.5 FL (ref 81.4–97.8)
MONOCYTES # BLD AUTO: 0.41 K/UL (ref 0–0.85)
MONOCYTES NFR BLD AUTO: 4.7 % (ref 0–13.4)
NEUTROPHILS # BLD AUTO: 5.43 K/UL (ref 2–7.15)
NEUTROPHILS NFR BLD: 61.9 % (ref 44–72)
NRBC # BLD AUTO: 0 K/UL
NRBC BLD-RTO: 0 /100 WBC
PLATELET # BLD AUTO: 218 K/UL (ref 164–446)
PMV BLD AUTO: 9.8 FL (ref 9–12.9)
POTASSIUM SERPL-SCNC: 4 MMOL/L (ref 3.6–5.5)
PROT SERPL-MCNC: 7 G/DL (ref 6–8.2)
PROTHROMBIN TIME: 11.8 SEC (ref 12–14.6)
RBC # BLD AUTO: 5.08 M/UL (ref 4.2–5.4)
SODIUM SERPL-SCNC: 136 MMOL/L (ref 135–145)
TROPONIN I SERPL-MCNC: <0.01 NG/ML (ref 0–0.04)
WBC # BLD AUTO: 8.8 K/UL (ref 4.8–10.8)

## 2018-09-13 PROCEDURE — G0378 HOSPITAL OBSERVATION PER HR: HCPCS

## 2018-09-13 PROCEDURE — 80053 COMPREHEN METABOLIC PANEL: CPT

## 2018-09-13 PROCEDURE — 93306 TTE W/DOPPLER COMPLETE: CPT

## 2018-09-13 PROCEDURE — 85025 COMPLETE CBC W/AUTO DIFF WBC: CPT

## 2018-09-13 PROCEDURE — A9270 NON-COVERED ITEM OR SERVICE: HCPCS | Performed by: HOSPITALIST

## 2018-09-13 PROCEDURE — 71045 X-RAY EXAM CHEST 1 VIEW: CPT

## 2018-09-13 PROCEDURE — 85730 THROMBOPLASTIN TIME PARTIAL: CPT

## 2018-09-13 PROCEDURE — 84484 ASSAY OF TROPONIN QUANT: CPT

## 2018-09-13 PROCEDURE — 83880 ASSAY OF NATRIURETIC PEPTIDE: CPT

## 2018-09-13 PROCEDURE — 700102 HCHG RX REV CODE 250 W/ 637 OVERRIDE(OP): Performed by: HOSPITALIST

## 2018-09-13 PROCEDURE — 36415 COLL VENOUS BLD VENIPUNCTURE: CPT

## 2018-09-13 PROCEDURE — 99285 EMERGENCY DEPT VISIT HI MDM: CPT

## 2018-09-13 PROCEDURE — 85610 PROTHROMBIN TIME: CPT

## 2018-09-13 PROCEDURE — 99220 PR INITIAL OBSERVATION CARE,LEVL III: CPT | Performed by: HOSPITALIST

## 2018-09-13 PROCEDURE — 93306 TTE W/DOPPLER COMPLETE: CPT | Mod: 26 | Performed by: INTERNAL MEDICINE

## 2018-09-13 PROCEDURE — 70450 CT HEAD/BRAIN W/O DYE: CPT

## 2018-09-13 PROCEDURE — 94760 N-INVAS EAR/PLS OXIMETRY 1: CPT

## 2018-09-13 RX ORDER — TELMISARTAN 80 MG/1
80 TABLET ORAL DAILY
Status: DISCONTINUED | OUTPATIENT
Start: 2018-09-14 | End: 2018-09-14 | Stop reason: HOSPADM

## 2018-09-13 RX ORDER — IBUPROFEN 400 MG/1
400 TABLET ORAL
Status: COMPLETED | OUTPATIENT
Start: 2018-09-13 | End: 2018-09-13

## 2018-09-13 RX ORDER — AMLODIPINE BESYLATE 10 MG/1
10 TABLET ORAL DAILY
Status: DISCONTINUED | OUTPATIENT
Start: 2018-09-14 | End: 2018-09-14 | Stop reason: HOSPADM

## 2018-09-13 RX ORDER — TELMISARTAN AND HYDROCHLORTHIAZIDE 80; 12.5 MG/1; MG/1
1 TABLET ORAL DAILY
Status: DISCONTINUED | OUTPATIENT
Start: 2018-09-13 | End: 2018-09-13

## 2018-09-13 RX ORDER — HYDRALAZINE HYDROCHLORIDE 10 MG/1
10 TABLET, FILM COATED ORAL EVERY 6 HOURS PRN
Status: DISCONTINUED | OUTPATIENT
Start: 2018-09-13 | End: 2018-09-14 | Stop reason: HOSPADM

## 2018-09-13 RX ORDER — ACETAMINOPHEN 325 MG/1
650 TABLET ORAL EVERY 4 HOURS PRN
Status: DISCONTINUED | OUTPATIENT
Start: 2018-09-13 | End: 2018-09-14 | Stop reason: HOSPADM

## 2018-09-13 RX ORDER — HYDROCHLOROTHIAZIDE 12.5 MG/1
12.5 TABLET ORAL DAILY
Status: DISCONTINUED | OUTPATIENT
Start: 2018-09-14 | End: 2018-09-14 | Stop reason: HOSPADM

## 2018-09-13 RX ADMIN — IBUPROFEN 400 MG: 400 TABLET, FILM COATED ORAL at 21:45

## 2018-09-13 RX ADMIN — ACETAMINOPHEN 650 MG: 325 TABLET, FILM COATED ORAL at 15:55

## 2018-09-13 ASSESSMENT — ENCOUNTER SYMPTOMS
NECK PAIN: 0
SHORTNESS OF BREATH: 1
FEVER: 0
HEARTBURN: 0
BACK PAIN: 0
NAUSEA: 0
CHILLS: 0
MYALGIAS: 0
VOMITING: 0
ABDOMINAL PAIN: 0
HALLUCINATIONS: 0
DIZZINESS: 1
DEPRESSION: 0
WEIGHT LOSS: 0

## 2018-09-13 ASSESSMENT — PATIENT HEALTH QUESTIONNAIRE - PHQ9
2. FEELING DOWN, DEPRESSED, IRRITABLE, OR HOPELESS: NOT AT ALL
SUM OF ALL RESPONSES TO PHQ9 QUESTIONS 1 AND 2: 0
1. LITTLE INTEREST OR PLEASURE IN DOING THINGS: NOT AT ALL

## 2018-09-13 ASSESSMENT — LIFESTYLE VARIABLES
ALCOHOL_USE: NO
EVER_SMOKED: YES

## 2018-09-13 ASSESSMENT — PAIN SCALES - GENERAL
PAINLEVEL_OUTOF10: 4
PAINLEVEL_OUTOF10: 5
PAINLEVEL_OUTOF10: 0

## 2018-09-13 NOTE — ED NOTES
The Medication Reconciliation process has been completed by interviewing the patient    Allergies have been reviewed  Antibiotic use in 30 days - none    Home Pharmacy:  [facility] - [place]

## 2018-09-13 NOTE — DISCHARGE PLANNING
Care Transition Team Assessment    Met with pt at bedside. According to pt she lives with her spouse, independent at baseline, does not use assistive devices. No needs identified. Pt plans to drive home as she drove to the hospital.    Information Source  Orientation : Oriented x 4  Information Given By: Patient    Readmission Evaluation  Is this a readmission?: No    Interdisciplinary Discharge Planning  Does Admitting Nurse Feel This Could be a Complex Discharge?: No  Primary Care Physician: Xochilt KAT  Patient or legal guardian wants to designate a caregiver (see row info): No  Support Systems: Spouse / Significant Other  Do You Take your Prescribed Medications Regularly: Yes  Able to Return to Previous ADL's: Yes  Mobility Issues: No  Prior Services: None  Patient Expects to be Discharged to:: Home  Assistance Needed: No  Durable Medical Equipment: Not Applicable    Discharge Preparedness  What are your discharge supports?: Spouse  Prior Functional Level: Ambulatory, Independent with Activities of Daily Living, Independent with Medication Management  Difficulity with ADLs: None  Difficulity with IADLs: None    Functional Assesment  Prior Functional Level: Ambulatory, Independent with Activities of Daily Living, Independent with Medication Management    Finances  Prescription Coverage: Yes    Discharge Risks or Barriers  Discharge risks or barriers?: No    Anticipated Discharge Information  Anticipated discharge disposition: Home  Discharge Address: [address] [place] [address]   Discharge Contact Phone Number: Patient [phone]

## 2018-09-13 NOTE — ED TRIAGE NOTES
Sharmin Mckay Venus  53 y.o.  female  Chief Complaint   Patient presents with   • Blood Pressure Problem     unable to control BP     Present to triage states she is having trouble controlling her Blood pressure. /94 in triage. Patient takes 2 BP medications ( amlodipine and micardis  HCT ). With multiple complaints. Pain at the back of neck/ chest pressure/ abdominal pain. Feeling her throat is swollen. Airway patent. All symptoms started 5 days ago.

## 2018-09-13 NOTE — ED PROVIDER NOTES
ED Provider Note    Scribed for Rakesh Meneses M.D. by Peng Vega. 9/13/2018  7:32 AM    Primary care provider: Xochilt Ricardo P.A.-C.  Means of arrival: walk in  History obtained from: patient  History limited by: none    CHIEF COMPLAINT  Chief Complaint   Patient presents with   • Blood Pressure Problem     unable to control BP       HPI  Sharmin Cardozo is a 53 y.o. female who presents to the Emergency Department for evaluation post syncopal episodes yesterday. Patient reports associated headache, hypertension, chest pain, leg swelling, nausea. She describes her chest pain as pressure. Patient has a history of hypertension and is prescribed Hydrochlorothiazide and Amlodipine but states that she has been unable to control her blood pressure. She states that she experienced 2 episodes of syncope yesterday prompting her to come to the ED for evaluation. Patient denies abdominal pain, vomiting    REVIEW OF SYSTEMS  Pertinent positives include syncope, headache, hypertension, chest pain, leg swelling, nausea.   Pertinent negatives include no abdominal pain, vomiting.    All other systems reviewed and negative. See HPI for further details.    PAST MEDICAL HISTORY   has a past medical history of ASTHMA; Hypertension; and Physiological ovarian cysts.    SURGICAL HISTORY   has a past surgical history that includes vag deliv only,prev c-sectn; tubal ligation; cystoscopy (3/24/2009); cholecystectomy; vaginal hysterectomy scope total (3/24/2009); and other.    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, 2014 ( had DUI and [facility])      History   Drug Use No       FAMILY HISTORY  Family History   Problem Relation Age of Onset   • Heart Disease Father        CURRENT  "MEDICATIONS  Current Outpatient Prescriptions:   •  telmisartan-hydrochlorothiazide (MICARDIS HCT) 80-12.5 MG per tablet, Take 1 Tab by mouth every day., Disp: 30 Tab, Rfl: 3  •  amLODIPine (NORVASC) 10 MG Tab, Take 1 Tab by mouth every day., Disp: 90 Tab, Rfl: 1  •  albuterol 108 (90 Base) MCG/ACT Aero Soln inhalation aerosol, Inhale 2 Puffs by mouth every 6 hours as needed for Shortness of Breath., Disp: 8.5 g, Rfl: 1  •  ipratropium-albuterol (DUONEB) 0.5-2.5 (3) MG/3ML nebulizer solution, 3 mL by Nebulization route every four hours as needed for Shortness of Breath (bronchospasm)., Disp: 75 mL, Rfl: 0  •  aspirin EC (ECOTRIN) 81 MG Tablet Delayed Response, Take 81 mg by mouth every day., Disp: , Rfl:   •  Multiple Vitamin (DAILY VITAMIN PO), Take 1 Tab by mouth every day., Disp: , Rfl:     ALLERGIES  Allergies   Allergen Reactions   • Bactrim Rash     Rxn - about 2016     • Iodine Nausea   • Tetanus Toxoid Rash and Swelling     Rash & swelling from tetanus vaccine        PHYSICAL EXAM  VITAL SIGNS: BP (!) 166/94   Pulse 72   Temp 35.9 °C (96.7 °F)   Resp 17   Ht 1.575 m (5' 2\")   Wt 104 kg (229 lb 4.5 oz)   LMP 04/17/2008   SpO2 97%   BMI 41.94 kg/m²     Nursing note and vitals reviewed.  Constitutional: Well-developed and well-nourished. No distress.   HENT: Head is normocephalic and atraumatic. Oropharynx is clear and moist without exudate or erythema.   Eyes: Pupils are equal, round, and reactive to light. Conjunctiva are normal.   Cardiovascular: Normal rate and regular rhythm. No murmur heard. Normal radial pulses.  Pulmonary/Chest: Breath sounds normal. No wheezes or rales.   Abdominal: Soft and non-tender. No distention  Obese.   Musculoskeletal: Extremities exhibit normal range of motion without edema or tenderness.   Neurological: Awake, alert and oriented to person, place, and time. No focal deficits noted.  Skin: Skin is warm and dry. No rash.   Psychiatric: Normal mood and affect. Appropriate " for clinical situation.    DIAGNOSTIC STUDIES / PROCEDURES    EKG Interpretation  12 lead EKG interpreted by me.   Rhythm:  Normal sinus rhythm   Rate: 60  Axis: Normal  Ectopy: None  Conduction: Normal  ST Segments: No acute change  T Waves: No acute change  QRS Waves: None  Clinical Impression: No ST elevation myocardial infarction.     LABS  Results for orders placed or performed during the hospital encounter of 09/13/18   CBC w/ Differential   Result Value Ref Range    WBC 8.8 4.8 - 10.8 K/uL    RBC 5.08 4.20 - 5.40 M/uL    Hemoglobin 16.4 (H) 12.0 - 16.0 g/dL    Hematocrit 47.5 (H) 37.0 - 47.0 %    MCV 93.5 81.4 - 97.8 fL    MCH 32.3 27.0 - 33.0 pg    MCHC 34.5 33.6 - 35.0 g/dL    RDW 44.2 35.9 - 50.0 fL    Platelet Count 218 164 - 446 K/uL    MPV 9.8 9.0 - 12.9 fL    Neutrophils-Polys 61.90 44.00 - 72.00 %    Lymphocytes 32.20 22.00 - 41.00 %    Monocytes 4.70 0.00 - 13.40 %    Eosinophils 0.80 0.00 - 6.90 %    Basophils 0.20 0.00 - 1.80 %    Immature Granulocytes 0.20 0.00 - 0.90 %    Nucleated RBC 0.00 /100 WBC    Neutrophils (Absolute) 5.43 2.00 - 7.15 K/uL    Lymphs (Absolute) 2.82 1.00 - 4.80 K/uL    Monos (Absolute) 0.41 0.00 - 0.85 K/uL    Eos (Absolute) 0.07 0.00 - 0.51 K/uL    Baso (Absolute) 0.02 0.00 - 0.12 K/uL    Immature Granulocytes (abs) 0.02 0.00 - 0.11 K/uL    NRBC (Absolute) 0.00 K/uL   Complete Metabolic Panel (CMP)   Result Value Ref Range    Sodium 136 135 - 145 mmol/L    Potassium 4.0 3.6 - 5.5 mmol/L    Chloride 105 96 - 112 mmol/L    Co2 24 20 - 33 mmol/L    Anion Gap 7.0 0.0 - 11.9    Glucose 93 65 - 99 mg/dL    Bun 18 8 - 22 mg/dL    Creatinine 0.57 0.50 - 1.40 mg/dL    Calcium 9.9 8.5 - 10.5 mg/dL    AST(SGOT) 12 12 - 45 U/L    ALT(SGPT) 17 2 - 50 U/L    Alkaline Phosphatase 80 30 - 99 U/L    Total Bilirubin 0.3 0.1 - 1.5 mg/dL    Albumin 4.3 3.2 - 4.9 g/dL    Total Protein 7.0 6.0 - 8.2 g/dL    Globulin 2.7 1.9 - 3.5 g/dL    A-G Ratio 1.6 g/dL   Troponin STAT   Result Value Ref  Range    Troponin I <0.01 0.00 - 0.04 ng/mL   Btype Natriuretic Peptide   Result Value Ref Range    B Natriuretic Peptide 14 0 - 100 pg/mL   PT/INR   Result Value Ref Range    PT 11.8 (L) 12.0 - 14.6 sec    INR 0.89 0.87 - 1.13   APTT   Result Value Ref Range    APTT 27.0 24.7 - 36.0 sec   ESTIMATED GFR   Result Value Ref Range    GFR If African American >60 >60 mL/min/1.73 m 2    GFR If Non African American >60 >60 mL/min/1.73 m 2   All labs reviewed by me.    RADIOLOGY  CT-HEAD W/O   Final Result      Head CT without contrast within normal limits. No evidence of acute cerebral hemorrhage or mass lesion.      DX-CHEST-PORTABLE (1 VIEW)   Final Result      No acute cardiopulmonary disease evident.      The radiologist's interpretation of all radiological studies have been reviewed by me.    COURSE & MEDICAL DECISION MAKING  Nursing notes, VS, PMSFHx reviewed in chart.     7:32 AM - Patient seen and examined at bedside. Ordered CT head, DX chest, CBC with differential, CMP, BNP, Troponin, Lipase, PT/INR, APTT to evaluate her symptoms. The differential diagnoses include but are not limited to: ACS, hypertensive emergency, arrhythmia     9:57 AM - I discussed the patient's case and the above findings with Dr. Hollis ( [facility]) who agrees to admit the patient.     Patient presents today with fairly atypical chest pain.  EKG is nonspecific.  Troponin is not elevated.  CT scan of the head is obtained.  This does not demonstrate any evidence of intracranial hemorrhage.  Laboratory studies are unrevealing.  Patient will be admitted to the hospital for further evaluation.    DISPOSITION:  Patient will be admitted to hospital in guarded condition.      FINAL IMPRESSION  1. Acute chest pain    2. Elevated blood pressure reading          Peng DUPREE ([place]), am scribing for, and in the presence of, Rakesh Meneses M.D..    Electronically signed by: Peng Vega (Scribe), 9/13/2018    Rakesh DUPREE,  M.D. personally performed the services described in this documentation, as scribed by Peng Vega in my presence, and it is both accurate and complete. C.    The note accurately reflects work and decisions made by me.  Rakesh Meneses  9/14/2018  11:08 AM

## 2018-09-13 NOTE — H&P
Hospital Medicine History & Physical Note    Date of Service  9/13/2018    Primary Care Physician  Xochilt Ricardo P.A.-C.    Consultants  none    Code Status  full    Chief Complaint  Chest pain    History of Presenting Illness   53 y.o. female who presented 9/13/2018 with past medical history of tobacco dependent and hypertension comes to the emergency room stating that she has had 2 syncopal episodes yesterday.  She states that she is cutting back on her smoking but she does get short of breath with exertion .She states that she is having chest pain described as a substernal pressure ,intensity 5/10,  intermittent, occurs at rest.  No radiation. she got concerned and presents of the emergency room.    No nausea no vomiting     no diaphoresis or palpitation    No fever chills      Review of Systems  Review of Systems   Constitutional: Negative for chills, fever and weight loss.   HENT: Negative for ear pain, hearing loss, nosebleeds and tinnitus.    Respiratory: Positive for shortness of breath.    Cardiovascular: Positive for chest pain.   Gastrointestinal: Negative for abdominal pain, heartburn, nausea and vomiting.   Genitourinary: Negative for dysuria, frequency and urgency.   Musculoskeletal: Negative for back pain, myalgias and neck pain.   Neurological: Positive for dizziness.   Psychiatric/Behavioral: Negative for depression, hallucinations and suicidal ideas.   All other systems reviewed and are negative.      Past Medical History   has a past medical history of ASTHMA; Hypertension; and Physiological ovarian cysts.    Surgical History   has a past surgical history that includes pr vag deliv only,prev c-sectn; tubal ligation; cystoscopy (3/24/2009); cholecystectomy; vaginal hysterectomy scope total (3/24/2009); and other.     Family History  family history includes Heart Disease in her father.     Social History   reports that she has been smoking Cigarettes.  She has been smoking about 0.25 packs per  day. She has never used smokeless tobacco. She reports that she does not drink alcohol or use drugs.    Allergies  Allergies   Allergen Reactions   • Bactrim Rash     Rxn - about 2016     • Iodine Nausea   • Tetanus Toxoid Rash and Swelling     Rash & swelling from tetanus vaccine        Medications  Prior to Admission Medications   Prescriptions Last Dose Informant Patient Reported? Taking?   Multiple Vitamin (DAILY VITAMIN PO) 9/13/2018 at 0600 Patient Yes No   Sig: Take 1 Tab by mouth every day.   albuterol 108 (90 Base) MCG/ACT Aero Soln inhalation aerosol 2 weeks at [facility] Patient No No   Sig: Inhale 2 Puffs by mouth every 6 hours as needed for Shortness of Breath.   amLODIPine (NORVASC) 10 MG Tab 9/13/2018 at 0600 Patient No No   Sig: Take 1 Tab by mouth every day.   aspirin EC (ECOTRIN) 81 MG Tablet Delayed Response 9/13/2018 at 0600 Patient Yes No   Sig: Take 81 mg by mouth every day.   ipratropium-albuterol (DUONEB) 0.5-2.5 (3) MG/3ML nebulizer solution 7/21/2018 Patient No No   Sig: 3 mL by Nebulization route every four hours as needed for Shortness of Breath (bronchospasm).   telmisartan-hydrochlorothiazide (MICARDIS HCT) 80-12.5 MG per tablet 9/13/2018 at 0600 Patient No No   Sig: Take 1 Tab by mouth every day.      Facility-Administered Medications: None       Physical Exam  Blood Pressure: 142/77   Temperature: 36.7 °C (98 °F)   Pulse: 66   Respiration: 16   Pulse Oximetry: 96 %     Physical Exam   Constitutional: She is oriented to person, place, and time. No distress.   HENT:   Head: Normocephalic and atraumatic.   Mouth/Throat: No oropharyngeal exudate.   Eyes: Right eye exhibits no discharge. Left eye exhibits no discharge. No scleral icterus.   Neck: No JVD present. No tracheal deviation present. No thyromegaly present.   Cardiovascular: Normal rate.  Exam reveals no gallop and no friction rub.    No murmur heard.  Pulmonary/Chest: Effort normal and breath sounds normal. No respiratory distress. She  has no wheezes. She has no rales. She exhibits no tenderness.   Mild bilateral rhonchi   Abdominal: Soft. Bowel sounds are normal. She exhibits no distension. There is no tenderness.   Musculoskeletal: She exhibits no edema, tenderness or deformity.   Neurological: She is alert and oriented to person, place, and time. No cranial nerve deficit. Coordination normal.   Skin: She is not diaphoretic. No erythema. No pallor.   Psychiatric: She has a normal mood and affect. Her behavior is normal.       Laboratory:  Recent Labs      09/13/18   0802   WBC  8.8   RBC  5.08   HEMOGLOBIN  16.4*   HEMATOCRIT  47.5*   MCV  93.5   MCH  32.3   MCHC  34.5   RDW  44.2   PLATELETCT  218   MPV  9.8     Recent Labs      09/13/18   0802   SODIUM  136   POTASSIUM  4.0   CHLORIDE  105   CO2  24   GLUCOSE  93   BUN  18   CREATININE  0.57   CALCIUM  9.9     Recent Labs      09/13/18   0802   ALTSGPT  17   ASTSGOT  12   ALKPHOSPHAT  80   TBILIRUBIN  0.3   GLUCOSE  93     Recent Labs      09/13/18   0802   APTT  27.0   INR  0.89     Recent Labs      09/13/18   0802   BNPBTYPENAT  14         Lab Results   Component Value Date    TROPONINI <0.01 09/13/2018       Urinalysis:    No results found     Imaging:  CT-HEAD W/O   Final Result      Head CT without contrast within normal limits. No evidence of acute cerebral hemorrhage or mass lesion.      DX-CHEST-PORTABLE (1 VIEW)   Final Result      No acute cardiopulmonary disease evident.            Assessment/Plan:  I anticipate this patient is appropriate for observation status at this time.    Precordial pain- (present on admission)   Assessment & Plan    Follow troponin  Check stress test        Nicotine dependence, cigarettes, uncomplicated- (present on admission)   Assessment & Plan    Patient advised to stop smoking        Vasovagal syncope- (present on admission)   Assessment & Plan    Follow on telemetry    Check troponin    Check echocardiogram        Essential hypertension- (present on  admission)   Assessment & Plan    Continue home medication        COPD with asthma (HCC)- (present on admission)   Assessment & Plan    Not in acute exacerbation     continue bronchodilators            VTE prophylaxis: scd

## 2018-09-13 NOTE — PROGRESS NOTES
Pt arrived to unit via wheelchair at 1045. Pt oriented to room, unit, and plan of care. Tele-monitor placed , SR, 67; Admit profile and assessment completed; Dr. Hollis informed of pt arrival; Pt complains of pain at IV site. New PIV established; Blood collected and sent to lab; Meal provided; All questions answered at this time. Call light within reach; fall precautions in place.

## 2018-09-14 ENCOUNTER — PATIENT OUTREACH (OUTPATIENT)
Dept: HEALTH INFORMATION MANAGEMENT | Facility: OTHER | Age: 53
End: 2018-09-14

## 2018-09-14 ENCOUNTER — APPOINTMENT (OUTPATIENT)
Dept: RADIOLOGY | Facility: MEDICAL CENTER | Age: 53
End: 2018-09-14
Attending: HOSPITALIST
Payer: COMMERCIAL

## 2018-09-14 VITALS
OXYGEN SATURATION: 98 % | BODY MASS INDEX: 42.19 KG/M2 | HEART RATE: 62 BPM | DIASTOLIC BLOOD PRESSURE: 80 MMHG | RESPIRATION RATE: 18 BRPM | SYSTOLIC BLOOD PRESSURE: 152 MMHG | TEMPERATURE: 98 F | WEIGHT: 229.28 LBS | HEIGHT: 62 IN

## 2018-09-14 PROCEDURE — A9502 TC99M TETROFOSMIN: HCPCS

## 2018-09-14 PROCEDURE — 700111 HCHG RX REV CODE 636 W/ 250 OVERRIDE (IP)

## 2018-09-14 PROCEDURE — A9270 NON-COVERED ITEM OR SERVICE: HCPCS | Performed by: HOSPITALIST

## 2018-09-14 PROCEDURE — G0378 HOSPITAL OBSERVATION PER HR: HCPCS

## 2018-09-14 PROCEDURE — 99217 PR OBSERVATION CARE DISCHARGE: CPT | Performed by: HOSPITALIST

## 2018-09-14 PROCEDURE — 700102 HCHG RX REV CODE 250 W/ 637 OVERRIDE(OP): Performed by: HOSPITALIST

## 2018-09-14 RX ORDER — METOPROLOL SUCCINATE 25 MG/1
25 TABLET, EXTENDED RELEASE ORAL DAILY
Qty: 30 TAB | Refills: 3 | Status: SHIPPED | OUTPATIENT
Start: 2018-09-14 | End: 2019-01-09 | Stop reason: SDUPTHER

## 2018-09-14 RX ORDER — REGADENOSON 0.08 MG/ML
INJECTION, SOLUTION INTRAVENOUS
Status: COMPLETED
Start: 2018-09-14 | End: 2018-09-14

## 2018-09-14 RX ADMIN — ASPIRIN 81 MG: 81 TABLET, DELAYED RELEASE ORAL at 06:00

## 2018-09-14 RX ADMIN — HYDROCHLOROTHIAZIDE 12.5 MG: 12.5 TABLET ORAL at 06:00

## 2018-09-14 RX ADMIN — REGADENOSON 0.4 MG: 0.08 INJECTION, SOLUTION INTRAVENOUS at 08:21

## 2018-09-14 RX ADMIN — AMLODIPINE BESYLATE 10 MG: 10 TABLET ORAL at 06:00

## 2018-09-14 RX ADMIN — TELMISARTAN 80 MG: 80 TABLET ORAL at 06:00

## 2018-09-14 ASSESSMENT — PAIN SCALES - GENERAL: PAINLEVEL_OUTOF10: 5

## 2018-09-14 NOTE — RESPIRATORY CARE
COPD EDUCATION by COPD CLINICAL EDUCATOR  9/14/2018 at 7:55 AM by Nancy Ordaz     Patient reviewed by COPD education team. Patient does not qualify for COPD program.

## 2018-09-14 NOTE — PROGRESS NOTES
Medicated with ibuprofen for HA, pt ambulated with staff around the hallway twice, expressing desire to smoke, nicotine patch offered,refusing ,hooked back to the monitors,call button within reach.

## 2018-09-14 NOTE — PROGRESS NOTES
A/o,assessment completed,poc discussed,verbalized understanding,still reports HA 6/10, per pt  Tylenol ineffective, elevated ly=385/73, call button within reach,will continue to monitor.

## 2018-09-14 NOTE — PROGRESS NOTES
Pt back from stress test,on tele with SR,c/o mod rt chest and shoulder pain,didnot need medicine for pain,awaiting for stress test report.

## 2018-09-14 NOTE — PROGRESS NOTES
Report received,assumed pt care.pt awake ,watching  Tv, SB on tele hr= 58, no ectopy noted at this time.

## 2018-09-14 NOTE — DISCHARGE INSTRUCTIONS
Metoprolol tablets  What is this medicine?  METOPROLOL (me TOE [place] lole) is a beta-blocker. Beta-blockers reduce the workload on the heart and help it to beat more regularly. This medicine is used to treat high blood pressure and to prevent chest pain. It is also used to after a heart attack and to prevent an additional heart attack from occurring.  This medicine may be used for other purposes; ask your health care provider or pharmacist if you have questions.  COMMON BRAND NAME(S): Lopressor  What should I tell my health care provider before I take this medicine?  They need to know if you have any of these conditions:  -diabetes  -heart or vessel disease like slow heart rate, worsening heart failure, heart block, sick sinus syndrome or Raynaud's disease  -kidney disease  -liver disease  -lung or breathing disease, like asthma or emphysema  -pheochromocytoma  -thyroid disease  -an unusual or allergic reaction to metoprolol, other beta-blockers, medicines, foods, dyes, or preservatives  -pregnant or trying to get pregnant  -breast-feeding  How should I use this medicine?  Take this medicine by mouth with a drink of water. Follow the directions on the prescription label. Take this medicine immediately after meals. Take your doses at regular intervals. Do not take more medicine than directed. Do not stop taking this medicine suddenly. This could lead to serious heart-related effects.  Talk to your pediatrician regarding the use of this medicine in children. Special care may be needed.  Overdosage: If you think you have taken too much of this medicine contact a poison control center or emergency room at once.  NOTE: This medicine is only for you. Do not share this medicine with others.  What if I miss a dose?  If you miss a dose, take it as soon as you can. If it is almost time for your next dose, take only that dose. Do not take double or extra doses.  What may interact with this medicine?  This medicine may interact  with the following medications:  -certain medicines for blood pressure, heart disease, irregular heart beat  -certain medicines for depression like monoamine oxidase (MAO) inhibitors, fluoxetine, or paroxetine  -clonidine  -dobutamine  -epinephrine  -isoproterenol  -reserpine  This list may not describe all possible interactions. Give your health care provider a list of all the medicines, herbs, non-prescription drugs, or dietary supplements you use. Also tell them if you smoke, drink alcohol, or use illegal drugs. Some items may interact with your medicine.  What should I watch for while using this medicine?  Visit your doctor or health care professional for regular check ups. Contact your doctor right away if your symptoms worsen. Check your blood pressure and pulse rate regularly. Ask your health care professional what your blood pressure and pulse rate should be, and when you should contact them.  You may get drowsy or dizzy. Do not drive, use machinery, or do anything that needs mental alertness until you know how this medicine affects you. Do not sit or stand up quickly, especially if you are an older patient. This reduces the risk of dizzy or fainting spells. Contact your doctor if these symptoms continue. Alcohol may interfere with the effect of this medicine. Avoid alcoholic drinks.  What side effects may I notice from receiving this medicine?  Side effects that you should report to your doctor or health care professional as soon as possible:  -allergic reactions like skin rash, itching or hives  -cold or numb hands or feet  -depression  -difficulty breathing  -faint  -fever with sore throat  -irregular heartbeat, chest pain  -rapid weight gain  -swollen legs or ankles  Side effects that usually do not require medical attention (report to your doctor or health care professional if they continue or are bothersome):  -anxiety or nervousness  -change in sex drive or performance  -dry  skin  -headache  -nightmares or trouble sleeping  -short term memory loss  -stomach upset or diarrhea  -unusually tired  This list may not describe all possible side effects. Call your doctor for medical advice about side effects. You may report side effects to FDA at 6-578-OHK-9928.  Where should I keep my medicine?  Keep out of the reach of children.  Store at room temperature between 15 and 30 degrees C (59 and 86 degrees F). Throw away any unused medicine after the expiration date.  NOTE: This sheet is a summary. It may not cover all possible information. If you have questions about this medicine, talk to your doctor, pharmacist, or health care provider.  © 2018 [employer]/Gold Standard (2014-08-22 14:40:36)  Discharge Instructions    Discharged to home by car with relative. Discharged via walking, hospital escort: Yes.  Special equipment needed: Not Applicable    Be sure to schedule a follow-up appointment with your primary care doctor or any specialists as instructed.     Discharge Plan:   Diet Plan: Discussed  Activity Level: Discussed  Smoking Cessation Offered: Patient Refused  Confirmed Follow up Appointment: Patient to Call and Schedule Appointment  Confirmed Symptoms Management: Discussed  Medication Reconciliation Updated: Yes  Influenza Vaccine Indication: Patient Refuses    I understand that a diet low in cholesterol, fat, and sodium is recommended for good health. Unless I have been given specific instructions below for another diet, I accept this instruction as my diet prescription.   Other diet:   Hypertension  Hypertension, commonly called high blood pressure, is when the force of blood pumping through your arteries is too strong. Your arteries are the blood vessels that carry blood from your heart throughout your body. A blood pressure reading consists of a higher number over a lower number, such as 110/72. The higher number (systolic) is the pressure inside your arteries when your heart pumps. The  lower number (diastolic) is the pressure inside your arteries when your heart relaxes. Ideally you want your blood pressure below 120/80.  Hypertension forces your heart to work harder to pump blood. Your arteries may become narrow or stiff. Having untreated or uncontrolled hypertension can cause heart attack, stroke, kidney disease, and other problems.  What increases the risk?  Some risk factors for high blood pressure are controllable. Others are not.  Risk factors you cannot control include:  · Race. You may be at higher risk if you are .  · Age. Risk increases with age.  · Gender. Men are at higher risk than women before age 45 years. After age 65, women are at higher risk than men.  Risk factors you can control include:  · Not getting enough exercise or physical activity.  · Being overweight.  · Getting too much fat, sugar, calories, or salt in your diet.  · Drinking too much alcohol.  What are the signs or symptoms?  Hypertension does not usually cause signs or symptoms. Extremely high blood pressure (hypertensive crisis) may cause headache, anxiety, shortness of breath, and nosebleed.  How is this diagnosed?  To check if you have hypertension, your health care provider will measure your blood pressure while you are seated, with your arm held at the level of your heart. It should be measured at least twice using the same arm. Certain conditions can cause a difference in blood pressure between your right and left arms. A blood pressure reading that is higher than normal on one occasion does not mean that you need treatment. If it is not clear whether you have high blood pressure, you may be asked to return on a different day to have your blood pressure checked again. Or, you may be asked to monitor your blood pressure at home for 1 or more weeks.  How is this treated?  Treating high blood pressure includes making lifestyle changes and possibly taking medicine. Living a healthy lifestyle can  help lower high blood pressure. You may need to change some of your habits.  Lifestyle changes may include:  · Following the DASH diet. This diet is high in fruits, vegetables, and whole grains. It is low in salt, red meat, and added sugars.  · Keep your sodium intake below 2,300 mg per day.  · Getting at least 30-45 minutes of aerobic exercise at least 4 times per week.  · Losing weight if necessary.  · Not smoking.  · Limiting alcoholic beverages.  · Learning ways to reduce stress.  Your health care provider may prescribe medicine if lifestyle changes are not enough to get your blood pressure under control, and if one of the following is true:  · You are 18-59 years of age and your systolic blood pressure is above 140.  · You are 60 years of age or older, and your systolic blood pressure is above 150.  · Your diastolic blood pressure is above 90.  · You have diabetes, and your systolic blood pressure is over 140 or your diastolic blood pressure is over 90.  · You have kidney disease and your blood pressure is above 140/90.  · You have heart disease and your blood pressure is above 140/90.  Your personal target blood pressure may vary depending on your medical conditions, your age, and other factors.  Follow these instructions at home:  · Have your blood pressure rechecked as directed by your health care provider.  · Take medicines only as directed by your health care provider. Follow the directions carefully. Blood pressure medicines must be taken as prescribed. The medicine does not work as well when you skip doses. Skipping doses also puts you at risk for problems.  · Do not smoke.  · Monitor your blood pressure at home as directed by your health care provider.  Contact a health care provider if:  · You think you are having a reaction to medicines taken.  · You have recurrent headaches or feel dizzy.  · You have swelling in your ankles.  · You have trouble with your vision.  Get help right away if:  · You  develop a severe headache or confusion.  · You have unusual weakness, numbness, or feel faint.  · You have severe chest or abdominal pain.  · You vomit repeatedly.  · You have trouble breathing.  This information is not intended to replace advice given to you by your health care provider. Make sure you discuss any questions you have with your health care provider.  Document Released: 12/18/2006 Document Revised: 05/25/2017 Document Reviewed: 10/10/2014  [employer] Interactive Patient Education © 2017 [employer]      Special Instructions: None    · Is patient discharged on Warfarin / Coumadin?   No     Depression / Suicide Risk    As you are discharged from this [facility] facility, it is important to learn how to keep safe from harming yourself.    Recognize the warning signs:  · Abrupt changes in personality, positive or negative- including increase in energy   · Giving away possessions  · Change in eating patterns- significant weight changes-  positive or negative  · Change in sleeping patterns- unable to sleep or sleeping all the time   · Unwillingness or inability to communicate  · Depression  · Unusual sadness, discouragement and loneliness  · Talk of wanting to die  · Neglect of personal appearance   · Rebelliousness- reckless behavior  · Withdrawal from people/activities they love  · Confusion- inability to concentrate     If you or a loved one observes any of these behaviors or has concerns about self-harm, here's what you can do:  · Talk about it- your feelings and reasons for harming yourself  · Remove any means that you might use to hurt yourself (examples: pills, rope, extension cords, firearm)  · Get professional help from the community (Mental Health, Substance Abuse, psychological counseling)  · Do not be alone:Call your Safe Contact- someone whom you trust who will be there for you.  · Call your local CRISIS HOTLINE 362-1802 or 108-200-6374  · Call your local Children's Mobile Crisis Response  Team [facility] (952) 077-3273 or [email or web address] [email or web address]  · Call the toll free National Suicide Prevention Hotlines   · National Suicide Prevention Lifeline 166-095-PLBM (6401)  · National Hope Line Network 800-SUICIDE (977-6916)

## 2018-09-14 NOTE — DISCHARGE SUMMARY
Discharge Summary    CHIEF COMPLAINT ON ADMISSION  Chief Complaint   Patient presents with   • Blood Pressure Problem     unable to control BP       Reason for Admission  Blood pressure complications     Admission Date  9/13/2018    CODE STATUS  Prior    HPI & HOSPITAL COURSE  53 y.o. female who presented 9/13/2018 with past medical history of tobacco dependent and hypertension comes to the emergency room stating that she has had 2 syncopal episodes yesterday.  She states that she is cutting back on her smoking but she does get short of breath with exertion .She states that she is having chest pain described as a substernal pressure ,intensity 5/10,  intermittent, occurs at rest.  No radiation. she got concerned and presents of the emergency room.    This patient was instructed to stop smoking .she was monitored on telemetry no evidence of arrhythmia.  She underwent a Persantine thallium stress test that showed no evidence of ischemia.  Normal ejection fraction.  Her blood pressure was elevated despite given her recent increase in her blood pressure medication.  At this time a new medication is to be initiated started her on Toprol-XL 25 mg p.o. daily .she will follow a regular doctor for blood pressure check in the outpatient setting          Therefore, she is discharged in good and stable condition to home with close outpatient follow-up.    The patient recovered much more quickly than anticipated on admission.    Discharge Date    9/14    FOLLOW UP ITEMS POST DISCHARGE  PCP 1 week    DISCHARGE DIAGNOSES  Active Problems:    Precordial pain POA: Yes      Overview: Resolved approximately one week after hospital discharge.     Nicotine dependence, cigarettes, uncomplicated POA: Yes      Overview: Stopped smoking a few months ago, occasionally used e-cigarette without       nicotine,  still smoking at home.      Resumed smoking a few cigarettes daily.  still smoking in the       house. Trouble with  stress and after meals.    COPD with asthma (HCC) POA: Yes      Overview: Symbicort 1 puff bid, will check label at home. Duoneb twice last month.       No recent albuterol use.                Essential hypertension POA: Yes      Overview: Diagnosed when she was around 22. Previous hospital visit she was       discharged on amlodipine 10 mg which she's been taking daily, recent       hospital visit she was started on carvedilol 6.25 mg bid and daily       aspirin.  Home readings done but she does not recall specific readings.       There has been fluctuation in the readings.       Managed with amlodipine 10 mg daily, hasn't been taking carvedilol since       shortly after discharge. No recent monitoring.     Vasovagal syncope POA: Yes  Resolved Problems:    * No resolved hospital problems. *      FOLLOW UP  Future Appointments  Date Time Provider Department Center   10/10/2018 8:05 AM Xochilt Ricardo P.A.-C. [facility]     No follow-up provider specified.    MEDICATIONS ON DISCHARGE     Medication List      START taking these medications      Instructions   metoprolol SR 25 MG Tb24  Commonly known as:  TOPROL XL   Take 1 Tab by mouth every day.  Dose:  25 mg        CONTINUE taking these medications      Instructions   albuterol 108 (90 Base) MCG/ACT Aers inhalation aerosol   Inhale 2 Puffs by mouth every 6 hours as needed for Shortness of Breath.  Dose:  2 Puff     amLODIPine 10 MG Tabs  Commonly known as:  NORVASC   Take 1 Tab by mouth every day.  Dose:  10 mg     aspirin EC 81 MG Tbec  Commonly known as:  ECOTRIN   Take 81 mg by mouth every day.  Dose:  81 mg     DAILY VITAMIN PO   Take 1 Tab by mouth every day.  Dose:  1 Tab     ipratropium-albuterol 0.5-2.5 (3) MG/3ML nebulizer solution  Commonly known as:  DUONEB   3 mL by Nebulization route every four hours as needed for Shortness of Breath (bronchospasm).  Dose:  3 mL     telmisartan-hydrochlorothiazide 80-12.5 MG per tablet  Commonly known as:   MICARDIS HCT   Take 1 Tab by mouth every day.  Dose:  1 Tab            Allergies  Allergies   Allergen Reactions   • Bactrim Rash     Rxn - about 2016     • Iodine Nausea   • Tetanus Toxoid Rash and Swelling     Rash & swelling from tetanus vaccine        DIET  Orders Placed This Encounter   Procedures   • Diet Order Regular     Standing Status:   Standing     Number of Occurrences:   1     Order Specific Question:   Diet:     Answer:   Regular [1]     Order Specific Question:   Miscellaneous modifications:     Answer:   No Decaf, No Caffeine(for test) [11]     Comments:   Patient to have no caffeine for 12 hours prior to exam (decaf, coffee, cola, tea, chocolate)       ACTIVITY  As tolerated.  Weight bearing as tolerated    CONSULTATIONS    none    PROCEDURES  Patient Information     Patient Name  Sharmin Marrero ([id number]) Sex  Female   1965   Room Bed Code Status Current Location   T218 00    Reprint Order Requisition     NM-CARDIAC STRESS TEST (Order #[id number]) on 18   Linked Documents     View [employer] Report   Last Resulted Time   Fri Sep 14, 2018  9:15 AM   Images     Show images for NM-CARDIAC STRESS TEST   Imaging Result Status     Status: Final result (Exam End: 2018  9:00 AM)   Imaging Previous Results     Open Hard Copy Result Report (Order #[id number] - NM-CARDIAC STRESS TEST)   Narrative                      Myocardial Perfusion   Report   NUCLEAR IMAGING INTERPRETATION   No evidence of significant jeopardized viable myocardium or prior myocardial    infarction.   Normal left ventricular size, ejection fraction, and wall motion.     SHARMIN MARRERO     MRN:    [id number]         Gender:    F     Exam Date: 2018 06:20     Exam Location:      Inpatient     Ordering Phys:     OTILIO ADAMS     NucMed Tech:       LIA Nicholson     Age:    53    :    1965        Ht (in):     62     Wt (lb):     229    BMI:    41.66       Radiologist     Risk Factors:              Family history of coronary disease, Smoking,   Diabetes,                              Heart murmur, fainting spells, shortness of breath,                                history of asthma/bronchitis     Indications:              Chest pain, unspecified     ICD Codes:                R079     Cardiac History:          Palpitations     Cardiac Meds:     Meds Past 24 hrs:     Pretest Chest Pain:       No symptoms     STRESS TEST      Sonali Ferroiscan       Dose: 0.4 mg   ol:                       Post-Injection Exercise:        No exercise followed the intravenous   injection               Resting HR (bpm):      57     Peak HR (bpm):         130     Resting BP (mmHg):       121    /   72     Peak BP (mmHg):       131   /   68     MaxPHR:     169     Target HR (bpm):       144     % MaxPHR:     77     Double Product:       [id number]     BP Response:     Stress Termination:       Completed Protocol     Stress Symptoms:   SHORTNESS OF BREATH, STOMACH PAINChest Pain: None     ECG     Resting ECG:     Stress ECG:     IMAGE PROTOCOL      Rest/Stress 1                        Day             RadiopharmaceuticalDose (mCi)   Imaging  Date      Imaging  Time           Inj to Img Time (min)   Rest:   Tc-99m             8            14-Sep-2018        08:03                   10           Tetrofosmin   Stress: Tc-99m             26.4         14-Sep-2018        08:41                   15           Tetrofosmin     Rest:   Administration Site:       Right forearm   Administered by:      LIA Nicholson     Stress:   Administration Site:       Right forearm   Administered by:      Donna Dillard RN     % Percent HR Achieved:   SPECT RESULTS     Technical Quality:       Good     Raw Data Analysis:   Summed Stress Score:    0   Summed Rest Score:    0   Summed Difference Score:        0   PERFUSION:   Normal myocardial perfusion with no ischemia.     FUNCTIONAL RESULTS (calculated via Gated  SPECT)     Stress Image LV EF:        69     %     Upper Normal Limit     Stress EDV:      97     ml   EDVI:    48      ml/m²     Stress ESV:      30     ml   ESVI:    15      ml/m²     TID:    1.29   TID - 1.19      TID (ed) - 1.23   LV Function:   Normal left ventricular wall motion.  LV ejection fraction = 69%.                   Shannon Joshi MD   (Electronically Signed)         LABORATORY  Lab Results   Component Value Date    SODIUM 136 09/13/2018    POTASSIUM 4.0 09/13/2018    CHLORIDE 105 09/13/2018    CO2 24 09/13/2018    GLUCOSE 93 09/13/2018    BUN 18 09/13/2018    CREATININE 0.57 09/13/2018    CREATININE 0.9 03/19/2009        Lab Results   Component Value Date    WBC 8.8 09/13/2018    HEMOGLOBIN 16.4 (H) 09/13/2018    HEMATOCRIT 47.5 (H) 09/13/2018    PLATELETCT 218 09/13/2018        Total time of the discharge process exceeds 37 minutes.

## 2018-10-09 ENCOUNTER — TELEPHONE (OUTPATIENT)
Dept: MEDICAL GROUP | Facility: PHYSICIAN GROUP | Age: 53
End: 2018-10-09

## 2018-10-09 NOTE — TELEPHONE ENCOUNTER
Future Appointments       Provider Department Center    10/10/2018 8:05 AM Xochilt Ricardo P.A.-C. [facility]        ESTABLISHED PATIENT PRE-VISIT PLANNING     Note: Patient will not be contacted if there is no indication to call.     1.  Reviewed notes from the last few office visits within the medical group: Yes    2.  If any orders were placed at last visit or intended to be done for this visit (i.e. 6 mos follow-up), do we have Results/Consult Notes?        •  Labs - Labs ordered, completed on 09/13/18 and results are in chart.       •  Imaging - Imaging ordered, completed and results are in chart.       •  Referrals - No referrals were ordered at last office visit.    3. Is this appointment scheduled as a Hospital Follow-Up? No    4.  Immunizations were updated in [employer] using Web[employer]?: Yes       •  Web [employer] Recommendations: MENACTRA (MCV4), TDAP, VARICELLA (Chicken Pox)  and ZOSTAVAX (Shingles)    5.  Patient is due for the following Health Maintenance Topics:   Health Maintenance Due   Topic Date Due   • PFT SCREENING  08/05/1985   • MAMMOGRAM  08/05/2007   • COLON CANCER SCREENING ANNUAL FIT  08/05/2017   • IMM ZOSTER VACCINES (1 of 2) 08/05/2017     6.  MDX printed for Provider? NO    7.  Patient was informed to arrive 15 min prior to their scheduled appointment and bring in their medication bottles. ALONDRA

## 2018-10-10 ENCOUNTER — OFFICE VISIT (OUTPATIENT)
Dept: MEDICAL GROUP | Facility: PHYSICIAN GROUP | Age: 53
End: 2018-10-10
Payer: COMMERCIAL

## 2018-10-10 VITALS
SYSTOLIC BLOOD PRESSURE: 102 MMHG | DIASTOLIC BLOOD PRESSURE: 70 MMHG | WEIGHT: 229 LBS | BODY MASS INDEX: 43.23 KG/M2 | TEMPERATURE: 98.6 F | HEIGHT: 61 IN | HEART RATE: 80 BPM | OXYGEN SATURATION: 97 %

## 2018-10-10 DIAGNOSIS — I10 ESSENTIAL HYPERTENSION: ICD-10-CM

## 2018-10-10 PROCEDURE — 99214 OFFICE O/P EST MOD 30 MIN: CPT | Performed by: PHYSICIAN ASSISTANT

## 2018-10-10 NOTE — PROGRESS NOTES
"Subjective:   Sharmin Cardozo is a 53 y.o. female here today for follow-up hypertension, ER visit for syncope/chest pain. Is an established patient of mine.    HPI:    Patient presents to the office today for follow-up on hypertension. At the last appointment a month ago, I had increased her blood pressure medication. Specifically, increase the dose of her telmisartan to 80 mg and added on hydrochlorothiazide 12.5 mg. She is also on amlodipine 10 mg daily. Unfortunately despite this, she was having difficulty controlling her blood pressure at home. She ended up going to the ER on 9/13. She states she had a home blood pressure reading of 195 systolic and was feeling upper chest pain, dizziness, and overall didn't feel \"quite right.\" Had two syncopal episodes. In the ER, had extensive evaluation done and was admitted for observation until 9/14. She states that all testing came back normal. She was started on yet another blood pressure medication, metoprolol SR 25 mg daily. Now, patient states she is feeling a lot better. All of her previous symptoms have resolved including the chest pain and dizziness. Has had no more syncopal episodes at home. She brings her blood pressure log in for review today--readings average about the low 130s over 70s. She denies any new concerns today.      Current medicines (including changes today)  Current Outpatient Prescriptions   Medication Sig Dispense Refill   • metoprolol SR (TOPROL XL) 25 MG TABLET SR 24 HR Take 1 Tab by mouth every day. 30 Tab 3   • telmisartan-hydrochlorothiazide (MICARDIS HCT) 80-12.5 MG per tablet Take 1 Tab by mouth every day. 30 Tab 3   • amLODIPine (NORVASC) 10 MG Tab Take 1 Tab by mouth every day. 90 Tab 1   • aspirin EC (ECOTRIN) 81 MG Tablet Delayed Response Take 81 mg by mouth every day.     • Multiple Vitamin (DAILY VITAMIN PO) Take 1 Tab by mouth every day.     • albuterol 108 (90 Base) MCG/ACT Aero Soln inhalation aerosol Inhale 2 Puffs by mouth " "every 6 hours as needed for Shortness of Breath. 8.5 g 1   • ipratropium-albuterol (DUONEB) 0.5-2.5 (3) MG/3ML nebulizer solution 3 mL by Nebulization route every four hours as needed for Shortness of Breath (bronchospasm). 75 mL 0     No current facility-administered medications for this visit.      She  has a past medical history of ASTHMA; Hypertension; and Physiological ovarian cysts.    ROS  As per HPI.       Objective:     Blood pressure 102/70, pulse 80, temperature 37 °C (98.6 °F), height 1.549 m (5' 1\"), weight 103.9 kg (229 lb), last menstrual period 04/17/2008, SpO2 97 %, not currently breastfeeding. Body mass index is 43.27 kg/m².     Physical Exam:  Constitutional: Alert, well-appearing, no distress.  Skin: Warm, dry, good turgor, no rashes in visible areas.  Eye: Pupils are equal and round, conjunctiva clear, lids normal.  ENMT: Lips without lesions, moist mucus membranes.  Respiratory: Unlabored respiratory effort, lungs clear to auscultation, no wheezes, no rhonchi.  Cardiovascular: Normal S1, S2, no murmur.      Assessment and Plan:   The following treatment plan was discussed    1. Essential hypertension  Established problem, now well controlled since the addition of metoprolol SR 25 mg daily. Blood pressures now at goal. Blood pressure today in the office is 102/70. Patient advised to continue all of her current blood pressure medications including the metoprolol SR, telmisartan-HCTZ, and amlodipine. She'll continue checking readings periodically at home. I did review her hospital records in detail including ER note, all lab results, CT head and chest x-ray results, and all cardiac testing including echocardiogram and cardiac stress test results. All of this came back unremarkable. Symptoms likely secondary to significantly elevated blood pressure at the time. Would like to see patient back in 3 months.      Followup: Return in about 3 months (around 1/10/2019).    Xochilt Ricardo P.A.-C.       "

## 2018-10-27 ENCOUNTER — APPOINTMENT (OUTPATIENT)
Dept: RADIOLOGY | Facility: IMAGING CENTER | Age: 53
End: 2018-10-27
Attending: NURSE PRACTITIONER
Payer: COMMERCIAL

## 2018-10-27 ENCOUNTER — OFFICE VISIT (OUTPATIENT)
Dept: URGENT CARE | Facility: PHYSICIAN GROUP | Age: 53
End: 2018-10-27
Payer: COMMERCIAL

## 2018-10-27 VITALS
BODY MASS INDEX: 43.43 KG/M2 | DIASTOLIC BLOOD PRESSURE: 70 MMHG | HEIGHT: 61 IN | WEIGHT: 230 LBS | SYSTOLIC BLOOD PRESSURE: 118 MMHG | HEART RATE: 82 BPM | TEMPERATURE: 98.8 F | RESPIRATION RATE: 20 BRPM | OXYGEN SATURATION: 95 %

## 2018-10-27 DIAGNOSIS — M25.511 ACUTE PAIN OF RIGHT SHOULDER: ICD-10-CM

## 2018-10-27 DIAGNOSIS — M19.011 OSTEOARTHRITIS OF RIGHT SHOULDER, UNSPECIFIED OSTEOARTHRITIS TYPE: Primary | ICD-10-CM

## 2018-10-27 PROCEDURE — 73030 X-RAY EXAM OF SHOULDER: CPT | Mod: 26,RT | Performed by: NURSE PRACTITIONER

## 2018-10-27 PROCEDURE — 99214 OFFICE O/P EST MOD 30 MIN: CPT | Performed by: NURSE PRACTITIONER

## 2018-10-27 RX ORDER — NAPROXEN 500 MG/1
500 TABLET ORAL 2 TIMES DAILY WITH MEALS
Qty: 40 TAB | Refills: 0 | Status: SHIPPED | OUTPATIENT
Start: 2018-10-27 | End: 2018-12-10

## 2018-10-27 ASSESSMENT — ENCOUNTER SYMPTOMS
BACK PAIN: 0
CHILLS: 0
NAUSEA: 0
SENSORY CHANGE: 0
VOMITING: 0
COUGH: 0
FEVER: 0
TINGLING: 0
PALPITATIONS: 0

## 2018-10-27 ASSESSMENT — LIFESTYLE VARIABLES: SUBSTANCE_ABUSE: 0

## 2018-10-27 NOTE — PROGRESS NOTES
"Subjective:      Sharmin Cardozo is a 53 y.o. female who presents with Arm Pain (R arm pain x 2-3 days)    PFSH reviewed and updated as necessary in EPIC electronic record with patient today.  Medications including OTC medications reviewed with patient.         Allergies   Allergen Reactions   • Bactrim Rash     Rxn - about 2016     • Iodine Nausea   • Tetanus Toxoid Rash and Swelling     Rash & swelling from tetanus vaccine              HPI this is a 53-year-old female complains of right arm pain for 2-3 days.  Pain is almost constant.  It woke her up twice during the night last night when she rolled over.  She is right-hand dominant.  She denies work injury.  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  She says she has been doing this for years and has never injured her shoulder.  Pain is 7 out of 10.  Hurts worse when she tries to move it.  Says it was hard for her to get dressed to the pain of moving her shoulder.  Pain radiates down into her mid upper arm and into her anterior chest.  She reports that she has had pain in her chest in the past and this is different and occurs only when she moves her shoulder.  No other aggravating or alleviating factors.  Treatments tried none    Review of Systems   Constitutional: Negative for chills and fever.   Respiratory: Negative for cough.    Cardiovascular: Negative for chest pain, palpitations and leg swelling.   Gastrointestinal: Negative for nausea and vomiting.   Musculoskeletal: Negative for back pain.   Skin: Negative for rash.   Neurological: Negative for tingling and sensory change.   Psychiatric/Behavioral: Negative for substance abuse.          Objective:     /70 (BP Location: Left arm, Patient Position: Sitting, BP Cuff Size: Large adult)   Pulse 82   Temp 37.1 °C (98.8 °F) (Temporal)   Resp 20   Ht 1.549 m (5' 1\")   Wt 104.3 kg (230 lb)   LMP 04/17/2008   SpO2 95%   BMI 43.46 " kg/m²      Physical Exam   Constitutional: She is oriented to person, place, and time. She appears well-developed and well-nourished.   Neck: Normal range of motion. Neck supple.   Cardiovascular: Normal rate.    Pulmonary/Chest: Effort normal and breath sounds normal. She exhibits tenderness (with palpation).       Musculoskeletal:        Right shoulder: She exhibits decreased range of motion, tenderness, bony tenderness and pain. She exhibits no swelling, no effusion, no crepitus, no deformity, no laceration, no spasm, normal pulse and normal strength.        Right elbow: Normal.       Cervical back: Normal.        Back:    Inspection: Right shoulder normal. Palpation: neg rotator cuff tenderness.  moderate impairment of glenohumeral motion. severe impairment of external rotation . Severe  impairment of internal rotation. positive ACJ tenderness.      Neurological: She is alert and oriented to person, place, and time.   Skin: Skin is warm and dry.   Psychiatric: She has a normal mood and affect. Her behavior is normal. Judgment and thought content normal.   Nursing note and vitals reviewed.              Assessment/Plan:     1. Osteoarthritis of right shoulder, unspecified osteoarthritis type    - naproxen (NAPROSYN) 500 MG Tab; Take 1 Tab by mouth 2 times a day, with meals.  Dispense: 40 Tab; Refill: 0    2. Acute pain of right shoulder    - DX-SHOULDER 2+ RIGHT; Future    Sling prn discomfort  Hot / cold compresses prn discomfort  Educated in proper administration of medication(s) ordered today including safety, possible SE, risks, benefits, rationale and alternatives to therapy.   Return to clinic or PCP 7-10 days if current symptoms are not resolving in a satisfactory manner or sooner if new or worsening symptoms occur.   Patient was advised differential diagnoses, signs and symptoms which would warrant further evaluation and /or emergent evaluation.    Patient was in agreement with this treatment plan and  seemed to understand without barriers.   Questions were encouraged and answered to patients satisfaction.   Given 5 days without heavy lifting - although she may need longer restrictions. She should FU with PCP for additional restrictions and possible physical therapy.

## 2018-10-27 NOTE — LETTER
October 27, 2018       Patient: Sharmin Cardozo   YOB: 1965   Date of Visit: 10/27/2018         To Whom It May Concern:    It is my medical opinion that Sharmin Cardozo return to light duty with the following restrictions No heavy lifting with right arm- nothing over 25 pounds for 5 days.    If you have any questions or concerns, please don't hesitate to call [phone]          Sincerely,          Brenda Alvarado, A.P.N.  Electronically Signed

## 2018-10-27 NOTE — LETTER
October 27, 2018       Patient: Sharmin Cardozo   YOB: 1965   Date of Visit: 10/27/2018         To Whom It May Concern:    It is my medical opinion that Sharmin Cardozo return to light duty with the following restrictions No heavy lifting with right shoulder/ arm - nothing over 25 pounds for 5 days. May wear shoulder sling as needed for comfort.    If you have any questions or concerns, please don't hesitate to call [phone]          Sincerely,          Brenda Alvarado, A.P.N.  Electronically Signed

## 2018-12-10 ENCOUNTER — OFFICE VISIT (OUTPATIENT)
Dept: URGENT CARE | Facility: PHYSICIAN GROUP | Age: 53
End: 2018-12-10
Payer: COMMERCIAL

## 2018-12-10 ENCOUNTER — APPOINTMENT (OUTPATIENT)
Dept: RADIOLOGY | Facility: IMAGING CENTER | Age: 53
End: 2018-12-10
Attending: NURSE PRACTITIONER
Payer: COMMERCIAL

## 2018-12-10 VITALS
HEIGHT: 62 IN | DIASTOLIC BLOOD PRESSURE: 80 MMHG | WEIGHT: 230 LBS | BODY MASS INDEX: 42.33 KG/M2 | HEART RATE: 77 BPM | OXYGEN SATURATION: 98 % | TEMPERATURE: 97.9 F | SYSTOLIC BLOOD PRESSURE: 116 MMHG

## 2018-12-10 DIAGNOSIS — R05.9 COUGH: ICD-10-CM

## 2018-12-10 DIAGNOSIS — J98.8 RESPIRATORY TRACT INFECTION: ICD-10-CM

## 2018-12-10 DIAGNOSIS — Z71.6 ENCOUNTER FOR SMOKING CESSATION COUNSELING: ICD-10-CM

## 2018-12-10 PROCEDURE — 71046 X-RAY EXAM CHEST 2 VIEWS: CPT | Mod: 26 | Performed by: NURSE PRACTITIONER

## 2018-12-10 PROCEDURE — 99214 OFFICE O/P EST MOD 30 MIN: CPT | Performed by: NURSE PRACTITIONER

## 2018-12-10 RX ORDER — AZITHROMYCIN 250 MG/1
TABLET, FILM COATED ORAL
Qty: 6 TAB | Refills: 0 | Status: SHIPPED | OUTPATIENT
Start: 2018-12-10 | End: 2019-01-09

## 2018-12-10 RX ORDER — METHYLPREDNISOLONE 4 MG/1
4 TABLET ORAL DAILY
Qty: 1 KIT | Refills: 0 | Status: SHIPPED | OUTPATIENT
Start: 2018-12-10 | End: 2019-01-09

## 2018-12-10 RX ORDER — BENZONATATE 200 MG/1
200 CAPSULE ORAL 3 TIMES DAILY PRN
Qty: 30 CAP | Refills: 0 | Status: SHIPPED | OUTPATIENT
Start: 2018-12-10 | End: 2018-12-14

## 2018-12-10 RX ORDER — INFLUENZA VIRUS VACCINE 15; 15; 15; 15 UG/.5ML; UG/.5ML; UG/.5ML; UG/.5ML
0.5 SUSPENSION INTRAMUSCULAR ONCE
COMMUNITY
Start: 2018-09-20 | End: 2019-03-07 | Stop reason: CLARIF

## 2018-12-10 ASSESSMENT — ENCOUNTER SYMPTOMS
NECK PAIN: 0
BACK PAIN: 0
SINUS PAIN: 0
CONSTIPATION: 0
WHEEZING: 1
VOMITING: 0
DIZZINESS: 0
PSYCHIATRIC NEGATIVE: 1
WEAKNESS: 0
NAUSEA: 0
PALPITATIONS: 0
ABDOMINAL PAIN: 0
PHOTOPHOBIA: 0
COUGH: 1
BLURRED VISION: 0
SORE THROAT: 1
DOUBLE VISION: 0
SHORTNESS OF BREATH: 0
MUSCULOSKELETAL NEGATIVE: 1
MYALGIAS: 0
SPEECH CHANGE: 0
CHILLS: 1
FEVER: 0
SENSORY CHANGE: 0
HEADACHES: 0
DIARRHEA: 0

## 2018-12-10 NOTE — PROGRESS NOTES
Subjective:   Sharmin Cardozo is a 53 y.o. female who presents for Cough (Productive cough, wheezing, fever and chills, diarrhea and vomiting x 2-3 wks )        HPI   Patient presents with new onset cough, wheezing, fever, sore throat, and chills that started 2.5 weeks ago. She states the cough is productive with yellow sputum and is worse at night. She states she has been using her nebulizer at home for relief of wheezing. She states her fever/chills have been intermittent.  She has been trying OTC cold remedies with little relief. She states that yesterday she had an episode of vomiting and diarrhea, but it has resolved as of today. She rates her symptoms as moderate in severity and not improving. Denies alleviating or aggravating factors.    Patient with hx of asthma and is current smoker. She has received her flu vaccine this season. She states she has had walking pneumonia before.    Review of Systems   Constitutional: Positive for chills and malaise/fatigue. Negative for fever.   HENT: Positive for congestion and sore throat. Negative for ear discharge, ear pain and sinus pain.    Eyes: Negative for blurred vision, double vision and photophobia.   Respiratory: Positive for cough and wheezing. Negative for shortness of breath.    Cardiovascular: Negative for chest pain and palpitations.   Gastrointestinal: Negative for abdominal pain, constipation, diarrhea, nausea and vomiting.   Genitourinary: Negative.    Musculoskeletal: Negative.  Negative for back pain, myalgias and neck pain.   Skin: Negative.    Neurological: Negative for dizziness, sensory change, speech change, weakness and headaches.   Psychiatric/Behavioral: Negative.    All other systems reviewed and are negative.    PMH:  has a past medical history of ASTHMA; Hypertension; and Physiological ovarian cysts. She also has no past medical history of Allergy.  MEDS:   Current Outpatient Prescriptions:   •  azithromycin (ZITHROMAX) 250 MG Tab, Take  two tabs po day one followed by one tab po day two through five with food, Disp: 6 Tab, Rfl: 0  •  MethylPREDNISolone (MEDROL DOSEPAK) 4 MG Tablet Therapy Pack, Take 1 Tab by mouth every day., Disp: 1 Kit, Rfl: 0  •  benzonatate (TESSALON) 200 MG capsule, Take 1 Cap by mouth 3 times a day as needed for Cough for up to 4 days., Disp: 30 Cap, Rfl: 0  •  metoprolol SR (TOPROL XL) 25 MG TABLET SR 24 HR, Take 1 Tab by mouth every day., Disp: 30 Tab, Rfl: 3  •  telmisartan-hydrochlorothiazide (MICARDIS HCT) 80-12.5 MG per tablet, Take 1 Tab by mouth every day., Disp: 30 Tab, Rfl: 3  •  amLODIPine (NORVASC) 10 MG Tab, Take 1 Tab by mouth every day., Disp: 90 Tab, Rfl: 1  •  albuterol 108 (90 Base) MCG/ACT Aero Soln inhalation aerosol, Inhale 2 Puffs by mouth every 6 hours as needed for Shortness of Breath., Disp: 8.5 g, Rfl: 1  •  ipratropium-albuterol (DUONEB) 0.5-2.5 (3) MG/3ML nebulizer solution, 3 mL by Nebulization route every four hours as needed for Shortness of Breath (bronchospasm)., Disp: 75 mL, Rfl: 0  •  aspirin EC (ECOTRIN) 81 MG Tablet Delayed Response, Take 81 mg by mouth every day., Disp: , Rfl:   •  Multiple Vitamin (DAILY VITAMIN PO), Take 1 Tab by mouth every day., Disp: , Rfl:   ALLERGIES:   Allergies   Allergen Reactions   • Bactrim Rash     Rxn - about 2016     • Iodine Nausea   • Tetanus Toxoid Rash and Swelling     Rash & swelling from tetanus vaccine      SURGHX:   Past Surgical History:   Procedure Laterality Date   • CYSTOSCOPY  3/24/2009    Performed by ZORAN BRIGGS at SURGERY SAME DAY [facility] ORS   • VAGINAL HYSTERECTOMY SCOPE TOTAL  3/24/2009    Performed by ZORAN BRIGGS at SURGERY SAME DAY [facility] ORS   • CHOLECYSTECTOMY     • OTHER      left elbow   • PB VAG DELIV ONLY,PBEV C-SECTN      x 4   • TUBAL LIGATION       SOCHX:  reports that she has been smoking Cigarettes.  She has been smoking about 0.25 packs per day. She has never used smokeless tobacco. She reports that she does  "not drink alcohol or use drugs.  FH: Family history was reviewed, no pertinent findings to report     Objective:   /80 (BP Location: Left arm, Patient Position: Sitting, BP Cuff Size: Large adult)   Pulse 77   Temp 36.6 °C (97.9 °F) (Temporal)   Ht 1.575 m (5' 2\")   Wt 104.3 kg (230 lb)   LMP 04/17/2008   SpO2 98%   BMI 42.07 kg/m²   Physical Exam   Constitutional: She is oriented to person, place, and time. She appears well-developed and well-nourished.   HENT:   Head: Normocephalic.   Right Ear: Hearing and ear canal normal. Tympanic membrane is not erythematous. A middle ear effusion is present.   Left Ear: Hearing, tympanic membrane and ear canal normal. Tympanic membrane is not erythematous.  No middle ear effusion.   Nose: Rhinorrhea present. Right sinus exhibits no maxillary sinus tenderness and no frontal sinus tenderness. Left sinus exhibits no maxillary sinus tenderness and no frontal sinus tenderness.   Mouth/Throat: Mucous membranes are normal. Posterior oropharyngeal erythema present. Tonsils are 1+ on the right. Tonsils are 0 on the left. No tonsillar exudate.   Eyes: Pupils are equal, round, and reactive to light. Conjunctivae, EOM and lids are normal.   Neck: Normal range of motion. No thyromegaly present.   Cardiovascular: Normal rate, regular rhythm and normal heart sounds.    Pulmonary/Chest: Effort normal. No respiratory distress. She has decreased breath sounds in the right lower field and the left lower field. She has no wheezes.   No acute distress in office   Abdominal: Soft. Normal appearance and bowel sounds are normal. There is no hepatosplenomegaly. There is no tenderness. There is no rigidity, no rebound, no guarding and no CVA tenderness. No hernia.   Lymphadenopathy:        Head (right side): No submandibular and no tonsillar adenopathy present.        Head (left side): No submandibular and no tonsillar adenopathy present.     She has no cervical adenopathy. "   Neurological: She is alert and oriented to person, place, and time.   Skin: Skin is warm and dry. No rash noted.   Psychiatric: She has a normal mood and affect. Her speech is normal and behavior is normal. Judgment and thought content normal. She is not actively hallucinating. Cognition and memory are normal. She is attentive.   Vitals reviewed.        Assessment/Plan:   Assessment    1. Cough  - DX-CHEST-2 VIEWS; Future  - benzonatate (TESSALON) 200 MG capsule; Take 1 Cap by mouth 3 times a day as needed for Cough for up to 4 days.  Dispense: 30 Cap; Refill: 0    2. Encounter for smoking cessation counseling    3. Respiratory tract infection  - azithromycin (ZITHROMAX) 250 MG Tab; Take two tabs po day one followed by one tab po day two through five with food  Dispense: 6 Tab; Refill: 0  - MethylPREDNISolone (MEDROL DOSEPAK) 4 MG Tablet Therapy Pack; Take 1 Tab by mouth every day.  Dispense: 1 Kit; Refill: 0    Patient declines smoking cessation at this time    Xray negative    Patient encouraged to increase clear liquid intake    Continue with nebulizer treatments at home for wheezing    Patient encouraged to use over-the-counter cold remedies such as DayQuil/Nyquil; use as directed per package    Discussed signs and symptoms of when to go to ER      Differential diagnosis, natural history, supportive care, and indications for immediate follow-up discussed.

## 2019-01-09 ENCOUNTER — OFFICE VISIT (OUTPATIENT)
Dept: MEDICAL GROUP | Facility: PHYSICIAN GROUP | Age: 54
End: 2019-01-09
Payer: COMMERCIAL

## 2019-01-09 VITALS
SYSTOLIC BLOOD PRESSURE: 102 MMHG | TEMPERATURE: 98.4 F | HEIGHT: 62 IN | OXYGEN SATURATION: 95 % | DIASTOLIC BLOOD PRESSURE: 62 MMHG | WEIGHT: 230 LBS | BODY MASS INDEX: 42.33 KG/M2 | HEART RATE: 64 BPM

## 2019-01-09 DIAGNOSIS — F17.210 NICOTINE DEPENDENCE, CIGARETTES, UNCOMPLICATED: ICD-10-CM

## 2019-01-09 DIAGNOSIS — M79.89 MASS OF SOFT TISSUE: ICD-10-CM

## 2019-01-09 DIAGNOSIS — I10 ESSENTIAL HYPERTENSION: ICD-10-CM

## 2019-01-09 DIAGNOSIS — Z12.39 ENCOUNTER FOR SCREENING FOR MALIGNANT NEOPLASM OF BREAST: ICD-10-CM

## 2019-01-09 PROCEDURE — 99214 OFFICE O/P EST MOD 30 MIN: CPT | Performed by: PHYSICIAN ASSISTANT

## 2019-01-09 RX ORDER — METOPROLOL SUCCINATE 25 MG/1
25 TABLET, EXTENDED RELEASE ORAL DAILY
Qty: 90 TAB | Refills: 2 | Status: SHIPPED | OUTPATIENT
Start: 2019-01-09 | End: 2019-10-05 | Stop reason: SDUPTHER

## 2019-01-09 RX ORDER — TELMISARTAN AND HYDROCHLORTHIAZIDE 80; 12.5 MG/1; MG/1
1 TABLET ORAL DAILY
Qty: 90 TAB | Refills: 2 | Status: SHIPPED | OUTPATIENT
Start: 2019-01-09 | End: 2019-10-05 | Stop reason: SDUPTHER

## 2019-01-09 RX ORDER — AMLODIPINE BESYLATE 10 MG/1
10 TABLET ORAL DAILY
Qty: 90 TAB | Refills: 2 | Status: SHIPPED | OUTPATIENT
Start: 2019-01-09 | End: 2020-01-06

## 2019-01-09 ASSESSMENT — PATIENT HEALTH QUESTIONNAIRE - PHQ9: CLINICAL INTERPRETATION OF PHQ2 SCORE: 0

## 2019-01-09 NOTE — PROGRESS NOTES
Subjective:   Sharmin Cardozo is a 53 y.o. female here today for follow-up on hypertension, skin lumps. Is an established patient of mine.    HPI:    Patient presents to the office today for follow-up on her blood pressure. When I last saw her in October, she had had a recent hospitalization at [facility] for uncontrolled hypertension, chest pain, and syncope. Metoprolol SR 25 mg daily was added to her regimen. She is now on that plus telmisartan-HCTZ 80-12.5 mg daily and amlodipine 10 mg daily. Since the last visit, she has been checking home BP readings and states systolic runs in the 110s with diastolic between 70s-80s. She feels fine. Denies any recurrence of symptoms that prompted hospital visit back in September.    She also has new concern today for some lumps that she's noticed in her skin. Believes they've been there for at least 2 years. They are not painful and do not bother her in any way. Has noticed lumps on back of neck, mid-back, and left upper arm. States had two similar lumps in her left forearm that were biopsied at the same time that she had ulnar nerve transposition surgery in 2003. States was told they were benign.    Patient continues to smoke but is down to 2 cigarettes per day. Goal is to completely quit. Not interested in any cessation aids as she's tried and did not find them helpful.      Current medicines (including changes today)  Current Outpatient Prescriptions   Medication Sig Dispense Refill   • telmisartan-hydrochlorothiazide (MICARDIS HCT) 80-12.5 MG per tablet Take 1 Tab by mouth every day. 90 Tab 2   • metoprolol SR (TOPROL XL) 25 MG TABLET SR 24 HR Take 1 Tab by mouth every day. 90 Tab 2   • amLODIPine (NORVASC) 10 MG Tab Take 1 Tab by mouth every day. 90 Tab 2   • aspirin EC (ECOTRIN) 81 MG Tablet Delayed Response Take 81 mg by mouth every day.     • FLUARIX QUADRIVALENT 0.5 ML Suspension Prefilled Syringe injection 0.5 mL by Intramuscular route Once.     • albuterol 108 (90  "Base) MCG/ACT Aero Soln inhalation aerosol Inhale 2 Puffs by mouth every 6 hours as needed for Shortness of Breath. 8.5 g 1   • Multiple Vitamin (DAILY VITAMIN PO) Take 1 Tab by mouth every day.       No current facility-administered medications for this visit.      She  has a past medical history of ASTHMA; Hypertension; and Physiological ovarian cysts. She also has no past medical history of Allergy.    ROS  Pulmonary ROS: No shortness of breath  Cardiovascular ROS: No chest pain  Neurologic ROS: No dizziness       Objective:     Blood pressure 102/62, pulse 64, temperature 36.9 °C (98.4 °F), height 1.575 m (5' 2\"), weight 104.3 kg (230 lb), last menstrual period 04/17/2008, SpO2 95 %, not currently breastfeeding. Body mass index is 42.07 kg/m².     Physical Exam:  Constitutional: Alert, no distress.  Skin: Warm, dry, good turgor, no rashes in visible areas. Has small, 0.5-1 cm firm subcutaneous masses palpable on left upper arm, left upper thoracic area, and right mid-back just adjacent to the spine. Masses are non-tender.  Eye: Pupils are equal and round, conjunctiva clear, lids normal.  ENMT: Lips without lesions, moist mucus membranes.  Respiratory: Unlabored respiratory effort, lungs clear to auscultation, no wheezes, no rhonchi.  Cardiovascular: Normal S1, S2, no murmur.      Assessment and Plan:   The following treatment plan was discussed    1. Essential hypertension  Chronic issue, remains well-controlled on current regimen. BP today in the office is 102/62 and has been running fine at home. Continue metoprolol SR, telmisartan-HCTZ, and amlodipine at current doses. Last screening lab work was in 8/2018. Will plan for re-check this August with next follow-up appointment at that time.  - COMP METABOLIC PANEL; Future  - Lipid Profile; Future  - telmisartan-hydrochlorothiazide (MICARDIS HCT) 80-12.5 MG per tablet; Take 1 Tab by mouth every day.  Dispense: 90 Tab; Refill: 2  - metoprolol SR (TOPROL XL) 25 MG " TABLET SR 24 HR; Take 1 Tab by mouth every day.  Dispense: 90 Tab; Refill: 2  - amLODIPine (NORVASC) 10 MG Tab; Take 1 Tab by mouth every day.  Dispense: 90 Tab; Refill: 2    2. Mass of soft tissue  New problem to me but chronic x at least 2 years per patient. Unable to find copy of pathology report from masses she had previously excised, but assuming they were likely lipomas given that patient states she was told they were benign. Suspect current masses are lipomas. Given small size and stability for at least 2 years, I don't recommend any intervention. She should continue to monitor and if masses begin increasing in size or become significant painful, should let me know and I will refer to general surgery.    3. Nicotine dependence, cigarettes, uncomplicated  Chronic issue, improving as she has cut back on smoking. Has plans to completely quit.     4. Encounter for screening for malignant neoplasm of breast  - MA-SCREEN MAMMO W/CAD-BILAT; Future      We also discussed her overdue health maintenance topics. She is agreeable with mammogram. Already has FIT ordered but has not done yet. She declines PFTs, colonoscopy. We do not currently have shingles vaccine in stock.    Followup: Return in about 7 months (around 8/9/2019) for f/u chronic conditions; Short.    Xochilt Ricardo P.A.-C.

## 2019-01-21 ENCOUNTER — TELEPHONE (OUTPATIENT)
Dept: MEDICAL GROUP | Facility: PHYSICIAN GROUP | Age: 54
End: 2019-01-21

## 2019-01-22 NOTE — TELEPHONE ENCOUNTER
1. Caller Name: Sharmin Cardozo                                           Call Back Number: [phone] (home)         Patient approves a detailed voicemail message: N\A    2. What are the patient's symptoms (location & severity)? (L) knee pain and swelling    3. Is this a new symptom Yes    4. When did it start? Today 1/21/19    5. Action taken per Active Symptom Guide: Urgent Care recommended & ER recommended     6. Patient agrees to recommended action per Active Symptom Escalation Protocol. Pt states she will walk in to urgent care tomorrow.

## 2019-03-07 ENCOUNTER — OFFICE VISIT (OUTPATIENT)
Dept: URGENT CARE | Facility: PHYSICIAN GROUP | Age: 54
End: 2019-03-07
Payer: COMMERCIAL

## 2019-03-07 ENCOUNTER — APPOINTMENT (OUTPATIENT)
Dept: RADIOLOGY | Facility: MEDICAL CENTER | Age: 54
End: 2019-03-07
Attending: EMERGENCY MEDICINE
Payer: COMMERCIAL

## 2019-03-07 ENCOUNTER — HOSPITAL ENCOUNTER (EMERGENCY)
Facility: MEDICAL CENTER | Age: 54
End: 2019-03-07
Attending: EMERGENCY MEDICINE
Payer: COMMERCIAL

## 2019-03-07 VITALS
WEIGHT: 230 LBS | HEART RATE: 89 BPM | SYSTOLIC BLOOD PRESSURE: 126 MMHG | DIASTOLIC BLOOD PRESSURE: 62 MMHG | OXYGEN SATURATION: 96 % | TEMPERATURE: 99.6 F | HEIGHT: 62 IN | BODY MASS INDEX: 42.33 KG/M2

## 2019-03-07 VITALS
RESPIRATION RATE: 18 BRPM | BODY MASS INDEX: 42.15 KG/M2 | HEART RATE: 65 BPM | HEIGHT: 62 IN | SYSTOLIC BLOOD PRESSURE: 154 MMHG | OXYGEN SATURATION: 92 % | DIASTOLIC BLOOD PRESSURE: 95 MMHG | WEIGHT: 229.06 LBS | TEMPERATURE: 97.9 F

## 2019-03-07 DIAGNOSIS — F17.200 SMOKER: ICD-10-CM

## 2019-03-07 DIAGNOSIS — I10 HYPERTENSION, UNSPECIFIED TYPE: ICD-10-CM

## 2019-03-07 DIAGNOSIS — R07.9 CHEST PAIN, UNSPECIFIED TYPE: ICD-10-CM

## 2019-03-07 DIAGNOSIS — M79.601 PAIN OF RIGHT UPPER EXTREMITY: ICD-10-CM

## 2019-03-07 DIAGNOSIS — R07.9 ACUTE CHEST PAIN: ICD-10-CM

## 2019-03-07 PROBLEM — D72.829 LEUCOCYTOSIS: Status: ACTIVE | Noted: 2019-03-07

## 2019-03-07 PROBLEM — D75.1 POLYCYTHEMIA: Status: ACTIVE | Noted: 2019-03-07

## 2019-03-07 PROBLEM — M25.511 RIGHT SHOULDER PAIN: Status: ACTIVE | Noted: 2019-03-07

## 2019-03-07 PROBLEM — R07.89 ATYPICAL CHEST PAIN: Status: ACTIVE | Noted: 2019-03-07

## 2019-03-07 LAB
ALBUMIN SERPL BCP-MCNC: 4.7 G/DL (ref 3.2–4.9)
ALBUMIN/GLOB SERPL: 1.6 G/DL
ALP SERPL-CCNC: 93 U/L (ref 30–99)
ALT SERPL-CCNC: 16 U/L (ref 2–50)
ANION GAP SERPL CALC-SCNC: 11 MMOL/L (ref 0–11.9)
AST SERPL-CCNC: 22 U/L (ref 12–45)
BASOPHILS # BLD AUTO: 0.3 % (ref 0–1.8)
BASOPHILS # BLD: 0.04 K/UL (ref 0–0.12)
BILIRUB SERPL-MCNC: 0.3 MG/DL (ref 0.1–1.5)
BNP SERPL-MCNC: 7 PG/ML (ref 0–100)
BUN SERPL-MCNC: 28 MG/DL (ref 8–22)
CALCIUM SERPL-MCNC: 10.4 MG/DL (ref 8.5–10.5)
CHLORIDE SERPL-SCNC: 102 MMOL/L (ref 96–112)
CO2 SERPL-SCNC: 22 MMOL/L (ref 20–33)
COMMENT 1642: NORMAL
CREAT SERPL-MCNC: 0.68 MG/DL (ref 0.5–1.4)
EKG IMPRESSION: NORMAL
EOSINOPHIL # BLD AUTO: 0.06 K/UL (ref 0–0.51)
EOSINOPHIL NFR BLD: 0.5 % (ref 0–6.9)
ERYTHROCYTE [DISTWIDTH] IN BLOOD BY AUTOMATED COUNT: 45.3 FL (ref 35.9–50)
GLOBULIN SER CALC-MCNC: 3 G/DL (ref 1.9–3.5)
GLUCOSE SERPL-MCNC: 89 MG/DL (ref 65–99)
HCT VFR BLD AUTO: 52 % (ref 37–47)
HGB BLD-MCNC: 18 G/DL (ref 12–16)
IMM GRANULOCYTES # BLD AUTO: 0.04 K/UL (ref 0–0.11)
IMM GRANULOCYTES NFR BLD AUTO: 0.3 % (ref 0–0.9)
LIPASE SERPL-CCNC: 11 U/L (ref 11–82)
LYMPHOCYTES # BLD AUTO: 3.84 K/UL (ref 1–4.8)
LYMPHOCYTES NFR BLD: 31.6 % (ref 22–41)
MCH RBC QN AUTO: 32.9 PG (ref 27–33)
MCHC RBC AUTO-ENTMCNC: 34.6 G/DL (ref 33.6–35)
MCV RBC AUTO: 95.1 FL (ref 81.4–97.8)
MONOCYTES # BLD AUTO: 0.41 K/UL (ref 0–0.85)
MONOCYTES NFR BLD AUTO: 3.4 % (ref 0–13.4)
MORPHOLOGY BLD-IMP: NORMAL
NEUTROPHILS # BLD AUTO: 7.76 K/UL (ref 2–7.15)
NEUTROPHILS NFR BLD: 63.9 % (ref 44–72)
NRBC # BLD AUTO: 0 K/UL
NRBC BLD-RTO: 0 /100 WBC
PLATELET # BLD AUTO: 263 K/UL (ref 164–446)
PMV BLD AUTO: 10.3 FL (ref 9–12.9)
POTASSIUM SERPL-SCNC: 4.3 MMOL/L (ref 3.6–5.5)
PROT SERPL-MCNC: 7.7 G/DL (ref 6–8.2)
RBC # BLD AUTO: 5.47 M/UL (ref 4.2–5.4)
SODIUM SERPL-SCNC: 135 MMOL/L (ref 135–145)
TROPONIN I SERPL-MCNC: <0.01 NG/ML (ref 0–0.04)
TROPONIN I SERPL-MCNC: <0.01 NG/ML (ref 0–0.04)
WBC # BLD AUTO: 12.2 K/UL (ref 4.8–10.8)

## 2019-03-07 PROCEDURE — 99214 OFFICE O/P EST MOD 30 MIN: CPT | Performed by: PHYSICIAN ASSISTANT

## 2019-03-07 PROCEDURE — 700117 HCHG RX CONTRAST REV CODE 255: Performed by: EMERGENCY MEDICINE

## 2019-03-07 PROCEDURE — 96374 THER/PROPH/DIAG INJ IV PUSH: CPT

## 2019-03-07 PROCEDURE — 99244 OFF/OP CNSLTJ NEW/EST MOD 40: CPT | Performed by: INTERNAL MEDICINE

## 2019-03-07 PROCEDURE — 99284 EMERGENCY DEPT VISIT MOD MDM: CPT

## 2019-03-07 PROCEDURE — 700102 HCHG RX REV CODE 250 W/ 637 OVERRIDE(OP): Performed by: EMERGENCY MEDICINE

## 2019-03-07 PROCEDURE — 80053 COMPREHEN METABOLIC PANEL: CPT

## 2019-03-07 PROCEDURE — 700111 HCHG RX REV CODE 636 W/ 250 OVERRIDE (IP): Performed by: EMERGENCY MEDICINE

## 2019-03-07 PROCEDURE — 93005 ELECTROCARDIOGRAM TRACING: CPT

## 2019-03-07 PROCEDURE — 71275 CT ANGIOGRAPHY CHEST: CPT

## 2019-03-07 PROCEDURE — 84484 ASSAY OF TROPONIN QUANT: CPT | Mod: 91

## 2019-03-07 PROCEDURE — 96375 TX/PRO/DX INJ NEW DRUG ADDON: CPT

## 2019-03-07 PROCEDURE — 71045 X-RAY EXAM CHEST 1 VIEW: CPT

## 2019-03-07 PROCEDURE — 85025 COMPLETE CBC W/AUTO DIFF WBC: CPT

## 2019-03-07 PROCEDURE — 73030 X-RAY EXAM OF SHOULDER: CPT | Mod: RT

## 2019-03-07 PROCEDURE — 93005 ELECTROCARDIOGRAM TRACING: CPT | Performed by: EMERGENCY MEDICINE

## 2019-03-07 PROCEDURE — 83690 ASSAY OF LIPASE: CPT

## 2019-03-07 PROCEDURE — 83880 ASSAY OF NATRIURETIC PEPTIDE: CPT

## 2019-03-07 PROCEDURE — 93000 ELECTROCARDIOGRAM COMPLETE: CPT | Performed by: PHYSICIAN ASSISTANT

## 2019-03-07 PROCEDURE — A9270 NON-COVERED ITEM OR SERVICE: HCPCS | Performed by: EMERGENCY MEDICINE

## 2019-03-07 RX ORDER — ONDANSETRON 2 MG/ML
4 INJECTION INTRAMUSCULAR; INTRAVENOUS ONCE
Status: COMPLETED | OUTPATIENT
Start: 2019-03-07 | End: 2019-03-07

## 2019-03-07 RX ORDER — ASPIRIN 81 MG/1
324 TABLET, CHEWABLE ORAL ONCE
Status: COMPLETED | OUTPATIENT
Start: 2019-03-07 | End: 2019-03-07

## 2019-03-07 RX ORDER — KETOROLAC TROMETHAMINE 30 MG/ML
15 INJECTION, SOLUTION INTRAMUSCULAR; INTRAVENOUS ONCE
Status: COMPLETED | OUTPATIENT
Start: 2019-03-07 | End: 2019-03-07

## 2019-03-07 RX ORDER — ASPIRIN 81 MG/1
243 TABLET, CHEWABLE ORAL ONCE
Status: COMPLETED | OUTPATIENT
Start: 2019-03-07 | End: 2019-03-07

## 2019-03-07 RX ADMIN — KETOROLAC TROMETHAMINE 15 MG: 30 INJECTION, SOLUTION INTRAMUSCULAR at 14:23

## 2019-03-07 RX ADMIN — IOHEXOL 56 ML: 350 INJECTION, SOLUTION INTRAVENOUS at 13:50

## 2019-03-07 RX ADMIN — ASPIRIN 81 MG 324 MG: 81 TABLET ORAL at 14:22

## 2019-03-07 RX ADMIN — ASPIRIN 243 MG: 81 TABLET, CHEWABLE ORAL at 11:21

## 2019-03-07 RX ADMIN — ONDANSETRON 4 MG: 2 INJECTION INTRAMUSCULAR; INTRAVENOUS at 13:39

## 2019-03-07 ASSESSMENT — ENCOUNTER SYMPTOMS
SPUTUM PRODUCTION: 0
FOCAL WEAKNESS: 0
BLURRED VISION: 0
DIARRHEA: 0
SHORTNESS OF BREATH: 1
ORTHOPNEA: 0
NAUSEA: 1
NAUSEA: 0
PALPITATIONS: 0
EYE DISCHARGE: 0
COUGH: 0
INSOMNIA: 0
NECK PAIN: 0
DIZZINESS: 1
SINUS PAIN: 0
EYE REDNESS: 0
NERVOUS/ANXIOUS: 0
FEVER: 0
DIAPHORESIS: 1
HEARTBURN: 0
DEPRESSION: 0
EYE REDNESS: 0
MYALGIAS: 0
FEVER: 0
EYE DISCHARGE: 0
STRIDOR: 0
ABDOMINAL PAIN: 0
EYE PAIN: 0
SEIZURES: 0
BACK PAIN: 0
HEADACHES: 0
WHEEZING: 0
HEADACHES: 1
DIZZINESS: 0
CHILLS: 0
PALPITATIONS: 0
VOMITING: 0
EXERTIONAL CHEST PRESSURE: 0
WEIGHT LOSS: 0
SHORTNESS OF BREATH: 0

## 2019-03-07 NOTE — ED NOTES
CT came to get pt. Pt reports she gets nauseated after CT injection.  MD made aware- verbal orders received.  CT to return soon to get patient

## 2019-03-07 NOTE — ASSESSMENT & PLAN NOTE
Recommend to get shoulder x-ray to rule out possible dislocation, fracture  Pain control  No neurological deficit  She will likely need to follow-up with primary care physician if her symptoms persist

## 2019-03-07 NOTE — ED PROVIDER NOTES
ED Provider Note    CHIEF COMPLAINT  Chief Complaint   Patient presents with   • Chest Pain   • Arm Pain     right    • Nausea       HPI  Sharmin Cardozo is a 53 y.o. female who presents complaining of some chest pain.  She had some chest pain yesterday, it hurt.  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  Radiated to her right shoulder.  Associate shortness of breath.  This is a sharp type of pain.  She denies any fever or chills.  Does not describe any injury of or overuse at all.  No belly pain.  No recent trips or travel.  No recent cough or cold symptoms.  No leg pain or swelling.  Her father had a history of blood clots sounds like DVT and pulmonary embolism.  No other complaint.    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)         SURGICAL HISTORY  Past Surgical History:   Procedure Laterality Date   • CYSTOSCOPY  3/24/2009    Performed by ZORAN BRIGGS at SURGERY SAME DAY [facility] ORS   • VAGINAL HYSTERECTOMY SCOPE TOTAL  3/24/2009    Performed by ZORAN BRIGGS at SURGERY SAME DAY [facility] ORS   • COMPL.ULNAR NERVE TRANSPOSITION W/ POSS AUTOGRAFT  2003   • MASS EXCISION GENERAL Left 2003    left forearm - benign per patient   • CHOLECYSTECTOMY     • PB VAG DELIV ONLY,PBEV C-SECTN      x 4   • TUBAL LIGATION         CURRENT MEDICATIONS  Home Medications     Reviewed by Joanne Thomas PhT (Pharmacy Tech) on 03/07/19 at 1220  Med List Status: Complete   Medication  "Last Dose Status   albuterol 108 (90 Base) MCG/ACT Aero Soln inhalation aerosol PRN Active   amLODIPine (NORVASC) 10 MG Tab 3/7/2019 Active   aspirin EC (ECOTRIN) 81 MG Tablet Delayed Response 3/7/2019 Active   metoprolol SR (TOPROL XL) 25 MG TABLET SR 24 HR 3/6/2019 Active   Multiple Vitamin (DAILY VITAMIN PO) 3/7/2019 Active   telmisartan-hydrochlorothiazide (MICARDIS HCT) 80-12.5 MG per tablet 3/7/2019 Active                I have reviewed the nurses notes and/or the list brought with the patient.    ALLERGIES  Allergies   Allergen Reactions   • Bactrim Rash     Rxn - about 2016     • Iodine Nausea   • Tetanus Toxoid Rash and Swelling     Rash & swelling from tetanus vaccine        REVIEW OF SYSTEMS  See HPI for further details. Review of systems as above, otherwise all other systems are negative.     PHYSICAL EXAM  VITAL SIGNS: /95   Pulse 97   Temp 36.3 °C (97.3 °F) (Temporal)   Resp 17   Ht 1.575 m (5' 2\")   Wt 103.9 kg (229 lb 0.9 oz)   LMP 04/17/2008   SpO2 98%   BMI 41.90 kg/m²     Constitutional: Well appearing patient in no acute distress.  Not toxic, nor ill in appearance.  HENT: Mucus membranes moist.  Oropharynx is clear.  Eyes: Pupils equally round.  No scleral icterus.   Neck: Full nontender range of motion.  Lymphatic: No cervical lymphadenopathy noted.   Cardiovascular: Regular heart rate and rhythm.  No murmurs, rubs, nor gallop appreciated.   Thorax & Lungs: Chest is nontender.  Lungs are clear to auscultation with good air movement bilaterally.  No wheeze, rhonchi, nor rales.   Abdomen: Soft, with no tenderness, rebound nor guarding.  No mass, pulsatile mass, nor hepatosplenomegaly appreciated.  Skin: No purpura nor petechia noted.  Extremities/Musculoskeletal: No sign of trauma.  Calves are nontender with no cords nor edema.  No Quique's sign.  Pulses are intact all around.   Neurologic: Alert & oriented.  Strength and sensation is intact all around.  Gait is normal.  Psychiatric: " Normal affect appropriate for the clinical situation.    EKG  I interpreted this EKG myself.  This is a 12-lead study.  The rhythm is sinus with a rate of 88.  There are no ST segment nor T wave abnormalities.  Interpretation: No ST segment elevation myocardial infarction.    LABS  Labs Reviewed   CBC WITH DIFFERENTIAL - Abnormal; Notable for the following:        Result Value    WBC 12.2 (*)     RBC 5.47 (*)     Hemoglobin 18.0 (*)     Hematocrit 52.0 (*)     Neutrophils (Absolute) 7.76 (*)     All other components within normal limits   COMP METABOLIC PANEL - Abnormal; Notable for the following:     Bun 28 (*)     All other components within normal limits   BTYPE NATRIURETIC PEPTIDE   LIPASE   TROPONIN   ESTIMATED GFR   PERIPHERAL SMEAR REVIEW   DIFFERENTIAL COMMENT         RADIOLOGY/PROCEDURES  I have reviewed the patient's film interpretations myself, and they are read out by the radiologist as:   CT-CTA CHEST PULMONARY ARTERY W/ RECONS   Final Result      1.  No evidence of pulmonary embolism.   2.  Increased attenuation of the bilateral renal pyramids which could indicate medullary nephrocalcinosis.            DX-CHEST-PORTABLE (1 VIEW)   Final Result      No acute cardiopulmonary process is seen.        .    MEDICAL RECORD  I have reviewed patient's medical record and pertinent results are listed above.    COURSE & MEDICAL DECISION MAKING  I have reviewed any medical record information, laboratory studies and radiographic results as noted above.  Patient presents with some chest pain.  Cardiovascular risk factors potentially family history but certainly hypertension.  Her discomfort is somewhat atypical for cardiac chest pain however this is clearly a concern of mine.  The quality of her symptoms does not sound aortic etiology.  I do worry about the possibility of pulmonary embolism.  CT scan however shows no evidence of this nor other incidental abnormality.  Troponin returns negative.  I discussed the  patient's case with Dr. Montes De Oca who is recommended a repeat troponin and Toradol.  He points out she has had a stress test within last 6 months which was unrevealing If repeat troponin is negative he is recommended discharge home.  Dr Montes De Oca has also asked for a shoulder xray, which I have ordered.  He will be following along on all of this and leaving a consult note.    FINAL IMPRESSION  1. Acute chest pain        This dictation was created using voice recognition software.    Electronically signed by: Evangelist Maharaj, 3/7/2019 2:07 PM

## 2019-03-07 NOTE — PROGRESS NOTES
Subjective:      Sharmin Cardozo is a 53 y.o. female who presents with Chest Pain (head numbness, sudden chest pain, SOB, R arm pain, onset 20 minutes ago)            Patient is a 53-year-old female who presents with acute onset of substernal chest pain with right upper shoulder and arm pain approximately 1 hour ago.  Patient reports similar episode of left-sided chest pain yesterday however this only lasted a few moments.  She does report associated shortness of breath, nausea and at the onset of symptoms broke out in a sweat.  Patient does have past medical history of hypertension and is a current smoker.  She denies prior cardiac history.  Of note patient denies recent cough, fevers or recent illness.      Chest Pain    This is a new problem. The current episode started today. The onset quality is sudden. The problem occurs constantly. The problem has been unchanged. The pain is at a severity of 6/10. The pain is moderate. The quality of the pain is described as sharp. The pain radiates to the right shoulder. Associated symptoms include diaphoresis, dizziness, headaches, malaise/fatigue, nausea and shortness of breath. Pertinent negatives include no exertional chest pressure, fever or palpitations. Risk factors include smoking/tobacco exposure.       Review of Systems   Constitutional: Positive for diaphoresis and malaise/fatigue. Negative for fever.   HENT: Negative for congestion and sinus pain.    Eyes: Negative for discharge and redness.   Respiratory: Positive for shortness of breath. Negative for wheezing.    Cardiovascular: Positive for chest pain. Negative for palpitations and leg swelling.   Gastrointestinal: Positive for nausea.   Skin: Negative for itching and rash.   Neurological: Positive for dizziness and headaches.   All other systems reviewed and are negative.         Objective:     /62 (BP Location: Left arm, Patient Position: Sitting, BP Cuff Size: Adult)   Pulse 89   Temp 37.6 °C  "(99.6 °F) (Temporal)   Ht 1.575 m (5' 2\")   Wt 104.3 kg (230 lb)   LMP 04/17/2008   SpO2 96%   BMI 42.07 kg/m²    PMH:  has a past medical history of ASTHMA; Hypertension; and Physiological ovarian cysts. She also has no past medical history of Allergy.  MEDS:   Current Outpatient Prescriptions:   •  telmisartan-hydrochlorothiazide (MICARDIS HCT) 80-12.5 MG per tablet, Take 1 Tab by mouth every day., Disp: 90 Tab, Rfl: 2  •  metoprolol SR (TOPROL XL) 25 MG TABLET SR 24 HR, Take 1 Tab by mouth every day., Disp: 90 Tab, Rfl: 2  •  amLODIPine (NORVASC) 10 MG Tab, Take 1 Tab by mouth every day., Disp: 90 Tab, Rfl: 2  •  FLUARIX QUADRIVALENT 0.5 ML Suspension Prefilled Syringe injection, 0.5 mL by Intramuscular route Once., Disp: , Rfl:   •  albuterol 108 (90 Base) MCG/ACT Aero Soln inhalation aerosol, Inhale 2 Puffs by mouth every 6 hours as needed for Shortness of Breath., Disp: 8.5 g, Rfl: 1  •  aspirin EC (ECOTRIN) 81 MG Tablet Delayed Response, Take 81 mg by mouth every day., Disp: , Rfl:   •  Multiple Vitamin (DAILY VITAMIN PO), Take 1 Tab by mouth every day., Disp: , Rfl:     Current Facility-Administered Medications:   •  aspirin (ASA) chewable tab 243 mg, 243 mg, Oral, Once, Arun Garza, P.A.-C.  ALLERGIES:   Allergies   Allergen Reactions   • Bactrim Rash     Rxn - about 2016     • Iodine Nausea   • Tetanus Toxoid Rash and Swelling     Rash & swelling from tetanus vaccine      SURGHX:   Past Surgical History:   Procedure Laterality Date   • CYSTOSCOPY  3/24/2009    Performed by ZORAN BRIGGS at [facility]   • VAGINAL HYSTERECTOMY SCOPE TOTAL  3/24/2009    Performed by ZORAN BRIGGS at [facility]   • COMPL.ULNAR NERVE TRANSPOSITION W/ POSS AUTOGRAFT  2003   • MASS EXCISION GENERAL Left 2003    left forearm - benign per patient   • CHOLECYSTECTOMY     • PB VAG DELIV ONLY,PBEV C-SECTN      x 4   • TUBAL LIGATION       SOCHX:  reports that she has been smoking " Cigarettes.  She has been smoking about 0.25 packs per day. She has never used smokeless tobacco. She reports that she does not drink alcohol or use drugs.  FH: Family history was reviewed, no pertinent findings to report    Physical Exam   Constitutional: She is oriented to person, place, and time. She appears well-developed and well-nourished. No distress.   HENT:   Head: Normocephalic and atraumatic.   Right Ear: External ear normal.   Left Ear: External ear normal.   Mouth/Throat: Oropharynx is clear and moist. No oropharyngeal exudate.   Eyes: Pupils are equal, round, and reactive to light. Conjunctivae and EOM are normal.   Neck: Normal range of motion. Neck supple. No tracheal deviation present.   Cardiovascular: Normal rate and regular rhythm.    No murmur heard.  Pulmonary/Chest: Effort normal. No respiratory distress. She has no wheezes. She exhibits tenderness.   Somewhat diminished at the bases.  Substernal chest wall tenderness.   Musculoskeletal: Normal range of motion. She exhibits no edema.   Neurological: She is alert and oriented to person, place, and time. She has normal strength. No cranial nerve deficit or sensory deficit. Coordination normal.   Without unilateral weakness,  is equal.   Skin: Skin is warm. No rash noted.   Psychiatric: Her behavior is normal. Judgment and thought content normal. Her mood appears anxious.   Vitals reviewed.            EKG: NSR at a rate of 83, T wave inversion in lead III this is unchanged compared to 7-11/18.  Without further acute ST changes.  Patient took baby aspirin this morning of which patient was given more to a total of 324mg.     Assessment/Plan:     1. Chest pain, unspecified type  - UC AMA/Refusal of Treatment  - aspirin (ASA) chewable tab 243 mg; Take 3 Tabs by mouth Once.    2. Smoker  3. Pain of right upper extremity  4. Hypertension, unspecified type    Expressed my concern today regarding recent acute onset of chest pain with associated  shortness of breath with radiation into the arm.  Patient has several risk factors of hypertension, history of smoker-I do feel that patient needs further cardiac workup at this time.  Patient was also placed on 2 L while in the office today.  Patient was very adamant and refused transport Magdy patient signed out AGAINST MEDICAL ADVICE please see scanned documentation.  Patient called and spoke with her parents who will take her to the hospital.  I called transfer center and gave report of which they made me aware that yuly Solorzano is on divert of which patient was instructed to go to [facility].  Patient was stable throughout the duration of her care today.

## 2019-03-07 NOTE — CONSULTS
Hospital Medicine Consultation    Date of Service  3/7/2019    Referring Physician  Sampson Cruz M.D.    Consulting Physician  Connor Montes De Oca M.D.    Reason for Consultation  Chest pain, right shoulder pain    History of Presenting Illness  53 y.o. Female with past medical history of essential hypertension, asthma who presented 3/7/2019 with   Right shoulder pain since earlier this morning while she was at her work.  She has the difficulty lifting of her shoulder.  But she denies any tingling numbness sensation or any weakness associated with it.  She also complained about  Discomfort over her chest intermittently.  In the ER she had a CT PE study done and negative for PE.  Her pain is improving a bit.  Hospitalist service was consulted for evaluation of her symptoms.      Review of Systems  Review of Systems   Constitutional: Negative for chills, fever and weight loss.   HENT: Negative for congestion and nosebleeds.    Eyes: Negative for blurred vision, pain, discharge and redness.   Respiratory: Negative for cough, sputum production, shortness of breath and stridor.    Cardiovascular: Positive for chest pain. Negative for palpitations and orthopnea.   Gastrointestinal: Negative for abdominal pain, diarrhea, heartburn, nausea and vomiting.   Genitourinary: Negative for dysuria, frequency and urgency.   Musculoskeletal: Negative for back pain, myalgias and neck pain.        Right shoulder pain   Skin: Negative for itching and rash.   Neurological: Negative for dizziness, focal weakness, seizures and headaches.   Psychiatric/Behavioral: Negative for depression. The patient is not nervous/anxious and does not have insomnia.        Past Medical History   has a past medical history of ASTHMA; Hypertension; and Physiological ovarian cysts. She also has no past medical history of Allergy.    Surgical History   has a past surgical history that includes pr vag deliv only,prev c-sectn; tubal ligation; cystoscopy  (3/24/2009); cholecystectomy; vaginal hysterectomy scope total (3/24/2009); compl.ulnar nerve transposition w/ poss autograft (2003); and mass excision general (Left, 2003).    Family History  family history includes Heart Disease in her father.    Social History   reports that she has been smoking Cigarettes.  She has been smoking about 0.25 packs per day. She has never used smokeless tobacco. She reports that she does not drink alcohol or use drugs.    Medications  No current facility-administered medications for this encounter.      Current Outpatient Prescriptions   Medication Sig Dispense Refill   • telmisartan-hydrochlorothiazide (MICARDIS HCT) 80-12.5 MG per tablet Take 1 Tab by mouth every day. 90 Tab 2   • metoprolol SR (TOPROL XL) 25 MG TABLET SR 24 HR Take 1 Tab by mouth every day. 90 Tab 2   • amLODIPine (NORVASC) 10 MG Tab Take 1 Tab by mouth every day. 90 Tab 2   • albuterol 108 (90 Base) MCG/ACT Aero Soln inhalation aerosol Inhale 2 Puffs by mouth every 6 hours as needed for Shortness of Breath. 8.5 g 1   • aspirin EC (ECOTRIN) 81 MG Tablet Delayed Response Take 81 mg by mouth every day.     • Multiple Vitamin (DAILY VITAMIN PO) Take 1 Tab by mouth every day.         Allergies  Allergies   Allergen Reactions   • Bactrim Rash     Rxn - about 2016     • Iodine Nausea   • Tetanus Toxoid Rash and Swelling     Rash & swelling from tetanus vaccine        Physical Exam  Temp:  [36.3 °C (97.3 °F)] 36.3 °C (97.3 °F)  Pulse:  [97] 97  Resp:  [17] 17  BP: (154)/(95) 154/95  SpO2:  [98 %] 98 %    Physical Exam   Constitutional: She is oriented to person, place, and time. No distress.   HENT:   Head: Normocephalic and atraumatic.   Mouth/Throat: Oropharynx is clear and moist.   Eyes: Pupils are equal, round, and reactive to light. Conjunctivae and EOM are normal.   Neck: Normal range of motion. Neck supple. No tracheal deviation present. No thyromegaly present.   Cardiovascular: Normal rate and regular rhythm.     No murmur heard.  Pulmonary/Chest: Effort normal and breath sounds normal. No respiratory distress. She has no wheezes.   Abdominal: Soft. Bowel sounds are normal. She exhibits no distension. There is no tenderness.   Musculoskeletal: She exhibits tenderness. She exhibits no edema.   Right shoulder pain and stiffness  Cannot raise arm above her shoulder   Neurological: She is alert and oriented to person, place, and time. No cranial nerve deficit.   Skin: Skin is warm and dry. She is not diaphoretic. No erythema.   Psychiatric: She has a normal mood and affect. Her behavior is normal. Thought content normal.       Fluids       Laboratory  Recent Labs      03/07/19   1216   WBC  12.2*   RBC  5.47*   HEMOGLOBIN  18.0*   HEMATOCRIT  52.0*   MCV  95.1   MCH  32.9   MCHC  34.6   RDW  45.3   PLATELETCT  263   MPV  10.3     Recent Labs      03/07/19   1216   SODIUM  135   POTASSIUM  4.3   CHLORIDE  102   CO2  22   GLUCOSE  89   BUN  28*   CREATININE  0.68   CALCIUM  10.4          Recent Labs      03/07/19   1216   BNPBTYPENAT  7            Imaging  CT-CTA CHEST PULMONARY ARTERY W/ RECONS   Final Result      1.  No evidence of pulmonary embolism.   2.  Increased attenuation of the bilateral renal pyramids which could indicate medullary nephrocalcinosis.            DX-CHEST-PORTABLE (1 VIEW)   Final Result      No acute cardiopulmonary process is seen.      DX-SHOULDER 2+ RIGHT    (Results Pending)     EKG  Per my read:  QTC: 372, HR 88, normal sinus rhythm, no ST/T changes  Assessment/Plan  Polycythemia- (present on admission)   Assessment & Plan    Patient may have underlying KENYA  Recommend to have sleepy study done as outpatient  Repeat cbc in 1-2 weeks and follow up with PCP     Leucocytosis- (present on admission)   Assessment & Plan    Likely reactive       Atypical chest pain- (present on admission)   Assessment & Plan    Recent history of stress test done in September 2018 with no acute finding  CT PE study showed  no pulmonary embolism  Initial trop and ekgs were negative  Would recommend to check trop 1 more time and if negative can discharge the patient home and follow up with PCP       Right shoulder pain- (present on admission)   Assessment & Plan    Recommend to get shoulder x-ray to rule out possible dislocation, fracture  Pain control  No neurological deficit  She will likely need to follow-up with primary care physician if her symptoms persist

## 2019-03-07 NOTE — ED TRIAGE NOTES
Chief Complaint   Patient presents with   • Chest Pain   • Arm Pain     right    • Nausea     Patient states she was sent from , states yesterday she had some right arm pain. Then again today at work it was more sever and accompanied by chest pain. Says she feels nauseous and weak. EKG completed, VSS.

## 2019-03-07 NOTE — ASSESSMENT & PLAN NOTE
Patient may have underlying KENYA  Recommend to have sleepy study done as outpatient  Repeat cbc in 1-2 weeks and follow up with PCP

## 2019-03-07 NOTE — ASSESSMENT & PLAN NOTE
Recent history of stress test done in September 2018 with no acute finding  CT PE study showed no pulmonary embolism  Initial trop and ekgs were negative  Would recommend to check trop 1 more time and if negative can discharge the patient home and follow up with PCP

## 2019-03-11 ENCOUNTER — APPOINTMENT (OUTPATIENT)
Dept: URGENT CARE | Facility: PHYSICIAN GROUP | Age: 54
End: 2019-03-11
Payer: COMMERCIAL

## 2019-03-11 ENCOUNTER — OFFICE VISIT (OUTPATIENT)
Dept: URGENT CARE | Facility: PHYSICIAN GROUP | Age: 54
End: 2019-03-11
Payer: COMMERCIAL

## 2019-03-11 VITALS
OXYGEN SATURATION: 98 % | HEIGHT: 62 IN | WEIGHT: 219 LBS | DIASTOLIC BLOOD PRESSURE: 68 MMHG | BODY MASS INDEX: 40.3 KG/M2 | SYSTOLIC BLOOD PRESSURE: 118 MMHG | TEMPERATURE: 98.3 F | RESPIRATION RATE: 16 BRPM | HEART RATE: 82 BPM

## 2019-03-11 DIAGNOSIS — R07.89 ATYPICAL CHEST PAIN: ICD-10-CM

## 2019-03-11 PROCEDURE — 99213 OFFICE O/P EST LOW 20 MIN: CPT | Performed by: PHYSICIAN ASSISTANT

## 2019-03-11 NOTE — LETTER
March 11, 2019         Patient: Sharmin Cardozo   YOB: 1965   Date of Visit: 3/11/2019           To Whom it May Concern:    Sharmin Cardozo was seen in my clinic on 3/11/2019.   Her records have been reviewed and she is to have been released to full duty on 03/09/19.     If you have any questions or concerns, please don't hesitate to call.        Sincerely,           Vinita Connors P.A.-C.  Electronically Signed

## 2019-03-11 NOTE — PROGRESS NOTES
Chief Complaint   Patient presents with   • Follow-Up     Pt needs a work release.       HISTORY OF PRESENT ILLNESS: Patient is a 53 y.o. female who presents today for about request for work release.  Patient had episode of chest pain on 03/07/19 and was seen in ED.  She had thorough work up and not acute cardiac or pulmonary process was determined.  ERP released her to work however he work needed specification on the day of release to full duty. She comes in seeking this today.  Feels well, no further episodes of chest pain or discomfort.  No SOB, nausea, dizziness or weakness.     Patient Active Problem List    Diagnosis Date Noted   • Precordial pain 02/02/2017     Priority: High   • Nicotine dependence, cigarettes, uncomplicated 12/13/2016     Priority: Medium   • COPD with asthma (HCC) 02/03/2017     Priority: Low   • Leukocytosis 12/13/2016     Priority: Low   • Right shoulder pain 03/07/2019   • Atypical chest pain 03/07/2019   • Leucocytosis 03/07/2019   • Polycythemia 03/07/2019   • Mass of soft tissue 01/09/2019   • Vasovagal syncope 09/13/2018   • Bilateral lower extremity edema 08/14/2018   • Morbid obesity with BMI of 40.0-44.9, adult ([facility]) 11/28/2017   • Foot pain, left 11/19/2017   • Dyslipidemia 03/01/2017   • Bilateral chronic knee pain 03/01/2017   • Essential hypertension 01/04/2017   • Premature surgical menopause 01/04/2017       Allergies:Bactrim; Iodine; and Tetanus toxoid    Current Outpatient Prescriptions Ordered in [facility]   Medication Sig Dispense Refill   • telmisartan-hydrochlorothiazide (MICARDIS HCT) 80-12.5 MG per tablet Take 1 Tab by mouth every day. 90 Tab 2   • metoprolol SR (TOPROL XL) 25 MG TABLET SR 24 HR Take 1 Tab by mouth every day. 90 Tab 2   • amLODIPine (NORVASC) 10 MG Tab Take 1 Tab by mouth every day. 90 Tab 2   • albuterol 108 (90 Base) MCG/ACT Aero Soln inhalation aerosol Inhale 2 Puffs by mouth every 6 hours as needed for Shortness of Breath. 8.5 g 1   • aspirin EC  "(ECOTRIN) 81 MG Tablet Delayed Response Take 81 mg by mouth every day.     • Multiple Vitamin (DAILY VITAMIN PO) Take 1 Tab by mouth every day.       No current [facility]-ordered facility-administered medications on file.        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   HENT: Negative for ear pain, nosebleeds, congestion, sore throat and neck pain.    Eyes: Negative for blurred vision.   Respiratory: Negative for cough, sputum production, shortness of breath and wheezing.    Cardiovascular: SEE HPI  Gastrointestinal: Negative for heartburn, nausea, vomiting and abdominal pain.     Exam:  Blood pressure 118/68, pulse 82, temperature 36.8 °C (98.3 °F), temperature source Temporal, resp. rate 16, height 1.575 m (5' 2\"), weight 99.3 kg (219 lb), last menstrual period 2008, SpO2 98 %, not currently breastfeeding.  General:  Well nourished, well developed female in NAD  Eyes: PERRLA, EOM within normal limits, no conjunctival injection, no scleral icterus, visual fields and acuity grossly intact.  Neck: no masses, range of motion within normal limits, no tracheal deviation. No lymphadenopathy  Pulmonary: Normal " respiratory effort, no wheezes, crackles, or rhonchi.  Cardiovascular: regular rate and rhythm without murmurs, rubs, or gallops.  Skin: No visible rashes or lesion. Warm, pink, dry.   Extremities: no clubbing, cyanosis, or edema.  Neuro: A&O x 3. Speech normal/clear.  Normal gait.         Assessment/Plan:  1. Atypical chest pain      RESOLVED       -work note releasing to full duty as of 03/09/19 printed for patient.         Supportive care, differential diagnoses, and indications for immediate follow-up discussed with patient.   Pathogenesis of diagnosis discussed including typical length and natural progression.   Instructed to return to clinic or nearest emergency department for any change in condition, further concerns, or worsening of symptoms.  Patient states understanding of the plan of care and discharge instructions.  Instructed to make an appointment, for follow up, with their primary care provider.      Vinita Connors P.A.-C.

## 2019-03-19 ENCOUNTER — OFFICE VISIT (OUTPATIENT)
Dept: MEDICAL GROUP | Facility: PHYSICIAN GROUP | Age: 54
End: 2019-03-19
Payer: COMMERCIAL

## 2019-03-19 VITALS
DIASTOLIC BLOOD PRESSURE: 84 MMHG | OXYGEN SATURATION: 95 % | SYSTOLIC BLOOD PRESSURE: 112 MMHG | TEMPERATURE: 98.6 F | WEIGHT: 219 LBS | HEIGHT: 62 IN | BODY MASS INDEX: 40.3 KG/M2 | HEART RATE: 66 BPM

## 2019-03-19 DIAGNOSIS — Z87.898 HISTORY OF CHEST PAIN: ICD-10-CM

## 2019-03-19 DIAGNOSIS — Z02.89 ENCOUNTER FOR COMPLETION OF FORM WITH PATIENT: ICD-10-CM

## 2019-03-19 PROCEDURE — 7101 PR PHYSICAL: Performed by: PHYSICIAN ASSISTANT

## 2019-03-19 NOTE — PROGRESS NOTES
"Subjective:   Sharmin Cardozo is a 53 y.o. female here today for FMLA paperwork.    HPI:    Patient presents today needing completion of FMLA paperwork for her job at [employer]. She was seen in  on 3/7 with complaints of chest and right arm pain.  It was recommended that she be evaluated in the ER which she did later that day.  While there, she underwent evaluation for her chest pain which was unremarkable so she was discharged home.  Her chest pain completely resolved.  It was recommended that she take the next day off, which was 3/8.  She returned to work the next day, 3/9.  She was seen in urgent care again on 3/11 to get clearance letter for work but her employer is now requesting that she have full FMLA paperwork completed.  Patient denies any recurrence of the chest pain that she previously had.    Current medicines (including changes today)  Current Outpatient Prescriptions   Medication Sig Dispense Refill   • telmisartan-hydrochlorothiazide (MICARDIS HCT) 80-12.5 MG per tablet Take 1 Tab by mouth every day. 90 Tab 2   • metoprolol SR (TOPROL XL) 25 MG TABLET SR 24 HR Take 1 Tab by mouth every day. 90 Tab 2   • amLODIPine (NORVASC) 10 MG Tab Take 1 Tab by mouth every day. 90 Tab 2   • aspirin EC (ECOTRIN) 81 MG Tablet Delayed Response Take 81 mg by mouth every day.     • Multiple Vitamin (DAILY VITAMIN PO) Take 1 Tab by mouth every day.     • albuterol 108 (90 Base) MCG/ACT Aero Soln inhalation aerosol Inhale 2 Puffs by mouth every 6 hours as needed for Shortness of Breath. 8.5 g 1     No current facility-administered medications for this visit.      She  has a past medical history of ASTHMA; Hypertension; and Physiological ovarian cysts. She also has no past medical history of Allergy.    ROS  As per HPI.       Objective:     Blood pressure 112/84, pulse 66, temperature 37 °C (98.6 °F), height 1.575 m (5' 2\"), weight 99.3 kg (219 lb), last menstrual period 04/17/2008, SpO2 95 %, not currently " breastfeeding. Body mass index is 40.06 kg/m².     Physical Exam:  Constitutional: Alert, well-appearing, no distress.  Skin: No rashes in visible areas.  Eye: Conjunctiva clear, lids normal.  ENMT: Lips without lesions, moist mucus membranes.      Assessment and Plan:   The following treatment plan was discussed    1. Encounter for completion of form with patient  2. History of chest pain  Patient had recent episode of chest pain that has completely resolved with no recurrence since that time.  She had negative workup of her chest pain and has had normal stress test in the last 6 months.  I did complete her [facility] paperwork basically stating that she was medically excused for the 2 days she missed, but she will not be expected to miss any further work from the chest pain.  I did release her back to work without restrictions.  Copy of the paperwork will be scanned into the patient's chart and the originals were given back to her to give to her employer.    Total 15 minutes face-to-face time spent with patient, with greater than 50% of the total time discussing patient's issues and symptoms as listed above in assessment and plan, as well as managing coordination of care for future evaluation and treatment.      Followup: Return if symptoms worsen or fail to improve.    Xochilt Ricardo P.A.-C.

## 2019-04-21 ENCOUNTER — OFFICE VISIT (OUTPATIENT)
Dept: URGENT CARE | Facility: PHYSICIAN GROUP | Age: 54
End: 2019-04-21
Payer: COMMERCIAL

## 2019-04-21 ENCOUNTER — APPOINTMENT (OUTPATIENT)
Dept: RADIOLOGY | Facility: IMAGING CENTER | Age: 54
End: 2019-04-21
Attending: NURSE PRACTITIONER
Payer: COMMERCIAL

## 2019-04-21 VITALS
RESPIRATION RATE: 18 BRPM | SYSTOLIC BLOOD PRESSURE: 136 MMHG | OXYGEN SATURATION: 97 % | BODY MASS INDEX: 40.3 KG/M2 | HEIGHT: 62 IN | TEMPERATURE: 98.9 F | WEIGHT: 219 LBS | DIASTOLIC BLOOD PRESSURE: 72 MMHG | HEART RATE: 80 BPM

## 2019-04-21 DIAGNOSIS — M25.561 ACUTE PAIN OF RIGHT KNEE: ICD-10-CM

## 2019-04-21 DIAGNOSIS — M25.461 EFFUSION OF KNEE JOINT RIGHT: ICD-10-CM

## 2019-04-21 DIAGNOSIS — M25.761 OSTEOPHYTE OF RIGHT KNEE: ICD-10-CM

## 2019-04-21 PROCEDURE — 73564 X-RAY EXAM KNEE 4 OR MORE: CPT | Mod: TC,RT | Performed by: NURSE PRACTITIONER

## 2019-04-21 PROCEDURE — 99213 OFFICE O/P EST LOW 20 MIN: CPT | Performed by: NURSE PRACTITIONER

## 2019-04-21 RX ORDER — IBUPROFEN 800 MG/1
800 TABLET ORAL EVERY 8 HOURS PRN
Qty: 90 TAB | Refills: 1 | Status: SHIPPED | OUTPATIENT
Start: 2019-04-21 | End: 2019-07-11

## 2019-04-21 ASSESSMENT — ENCOUNTER SYMPTOMS
MYALGIAS: 1
HEADACHES: 1
CHILLS: 0
FEVER: 0
TINGLING: 0
SENSORY CHANGE: 0

## 2019-04-21 NOTE — LETTER
April 21, 2019        Sharmin Mckay Romeo  [id number] [address] [facility] [id number]        Sharmin was seen in our clinic today and she is excused from work for tomorrow, April 21. Thank you.  If you have any questions or concerns, please don't hesitate to call.        Sincerely,        LUCAS Beard.P.[email or web address].    Electronically Signed

## 2019-04-21 NOTE — PROGRESS NOTES
Subjective:      Sharmin Cardozo is a 53 y.o. female who presents with Knee Pain (Right knee pain and edema x 3 days)            HPI New. 53 year old female with 3 day history of knee pain on the right. She reports pain and swelling, no injury. She has no ankle pain or hip pain on this side. Hard to bear weight. History of chronic bilateral knee pain. She has been using topicals and aleve as well as ice.  Bactrim; Iodine; and Tetanus toxoid  Current Outpatient Prescriptions on File Prior to Visit   Medication Sig Dispense Refill   • telmisartan-hydrochlorothiazide (MICARDIS HCT) 80-12.5 MG per tablet Take 1 Tab by mouth every day. 90 Tab 2   • metoprolol SR (TOPROL XL) 25 MG TABLET SR 24 HR Take 1 Tab by mouth every day. 90 Tab 2   • amLODIPine (NORVASC) 10 MG Tab Take 1 Tab by mouth every day. 90 Tab 2   • albuterol 108 (90 Base) MCG/ACT Aero Soln inhalation aerosol Inhale 2 Puffs by mouth every 6 hours as needed for Shortness of Breath. 8.5 g 1   • aspirin EC (ECOTRIN) 81 MG Tablet Delayed Response Take 81 mg by mouth every day.     • Multiple Vitamin (DAILY VITAMIN PO) Take 1 Tab by mouth every day.       No current facility-administered medications on file prior to visit.      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      Review of Systems   Constitutional:  "Negative for chills, fever and malaise/fatigue.   Musculoskeletal: Positive for joint pain and myalgias.   Neurological: Positive for headaches. Negative for tingling and sensory change.          Objective:     /72   Pulse 80   Temp 37.2 °C (98.9 °F) (Temporal)   Resp 18   Ht 1.575 m (5' 2\")   Wt 99.3 kg (219 lb)   LMP 04/17/2008   SpO2 97%   BMI 40.06 kg/m²      Physical Exam   Constitutional: She is oriented to person, place, and time. She appears well-developed and well-nourished. No distress.   Cardiovascular: Normal rate, regular rhythm and normal heart sounds.    No murmur heard.  Pulmonary/Chest: Breath sounds normal. No respiratory distress.   Musculoskeletal:        Right knee: She exhibits decreased range of motion and swelling. Tenderness found. Medial joint line and lateral joint line tenderness noted.   Neurological: She is alert and oriented to person, place, and time.   Skin: Skin is warm and dry. No erythema.   Psychiatric: She has a normal mood and affect. Her behavior is normal. Thought content normal.   Nursing note and vitals reviewed.              Assessment/Plan:     1. Acute pain of right knee  DX-KNEE COMPLETE 4+ RIGHT   2. Osteophyte of right knee  REFERRAL TO ORTHOPEDICS    ibuprofen (MOTRIN) 800 MG Tab   3. Effusion of knee joint right  REFERRAL TO ORTHOPEDICS     X-ray with osteophyte and effusion.  nsaids and icing.   Work note and ortho referral.  Rest.  Differential diagnosis, natural history, supportive care, and indications for immediate follow-up discussed at length.     "

## 2019-06-26 ENCOUNTER — OFFICE VISIT (OUTPATIENT)
Dept: MEDICAL GROUP | Facility: PHYSICIAN GROUP | Age: 54
End: 2019-06-26
Payer: COMMERCIAL

## 2019-06-26 VITALS
BODY MASS INDEX: 40.3 KG/M2 | HEIGHT: 62 IN | TEMPERATURE: 97.2 F | OXYGEN SATURATION: 95 % | SYSTOLIC BLOOD PRESSURE: 140 MMHG | WEIGHT: 219 LBS | HEART RATE: 74 BPM | DIASTOLIC BLOOD PRESSURE: 82 MMHG

## 2019-06-26 DIAGNOSIS — M25.562 ACUTE PAIN OF BOTH KNEES: ICD-10-CM

## 2019-06-26 DIAGNOSIS — M25.462 BILATERAL KNEE SWELLING: ICD-10-CM

## 2019-06-26 DIAGNOSIS — M25.561 ACUTE PAIN OF BOTH KNEES: ICD-10-CM

## 2019-06-26 DIAGNOSIS — M25.461 BILATERAL KNEE SWELLING: ICD-10-CM

## 2019-06-26 PROCEDURE — 99214 OFFICE O/P EST MOD 30 MIN: CPT | Performed by: PHYSICIAN ASSISTANT

## 2019-06-26 RX ORDER — PREDNISONE 20 MG/1
TABLET ORAL
Qty: 32 TAB | Refills: 0 | Status: SHIPPED | OUTPATIENT
Start: 2019-06-26 | End: 2019-07-11

## 2019-06-26 RX ORDER — TRAMADOL HYDROCHLORIDE 50 MG/1
50-100 TABLET ORAL EVERY 8 HOURS PRN
Qty: 30 TAB | Refills: 0 | Status: SHIPPED | OUTPATIENT
Start: 2019-06-26 | End: 2019-07-01

## 2019-06-26 NOTE — LETTER
[facility]  Xochilt Ricardo P.A.-C.  [address]  [facility] [address]  Fax: [phone]   Authorization for Release/Disclosure of   Protected Health Information   Name: SHARMIN MARRERO : 1965 SSN: [id number]   Address: [address] [place] [address]  [place] [address] Phone:    [phone] (home)    I authorize the entity listed below to release/disclose the PHI below to:   [facility]/Xochilt Ricardo P.A.-C. and Xochilt Ricardo P.A.-C.   Provider or Entity Name:  [facility]   Address   City, State, [facility]   Phone:      Fax:     Reason for request: continuity of care   Information to be released:    [  ] LAST COLONOSCOPY,  including any PATH REPORT and follow-up  [  ] LAST FIT/COLOGUARD RESULT [  ] LAST DEXA  [  ] LAST MAMMOGRAM  [  ] LAST PAP  [  ] LAST LABS [  ] RETINA EXAM REPORT  [  ] IMMUNIZATION RECORDS  [ xx ] Release all info      [ xx ] Check here and initial the line next to each item to release ALL health information INCLUDING  _____ Care and treatment for drug and / or alcohol abuse  _____ HIV testing, infection status, or AIDS  _____ Genetic Testing    DATES OF SERVICE OR TIME PERIOD TO BE DISCLOSED: _____________  I understand and acknowledge that:  * This Authorization may be revoked at any time by you in writing, except if your health information has already been used or disclosed.  * Your health information that will be used or disclosed as a result of you signing this authorization could be re-disclosed by the recipient. If this occurs, your re-disclosed health information may no longer be protected by State or Federal laws.  * You may refuse to sign this Authorization. Your refusal will not affect your ability to obtain treatment.  * This Authorization becomes effective upon signing and will  on (date) __________.      If no date is indicated, this Authorization will  one (1) year from the signature date.    Name: Sharmin Mckay  Juliane    Signature:   Date:     6/26/2019       PLEASE FAX REQUESTED RECORDS BACK TO: [phone]

## 2019-06-26 NOTE — PROGRESS NOTES
"Subjective:   Sharmin Cardozo is a 53 y.o. female here today for knee pain. Is an established patient of mine.    HPI:    Sharmin presents today with concern for acute worsening of bilateral knee pain over the last 2 months. She states that the knee pain has been progressive since initial onset. Current pain is constant. She denies any history of injury.  Pain is worse on the left side but does have pain on the right as well. Pain is associated with swelling. In the last week, pain has gotten so severe that she feels she can barely walk.  She has had 3 falls since last week. She does have chronic knee pain on both sides due to arthritis but states this pain feels different. Pain is now to the extent that it is affecting her job.    She went to [facility] urgent care back in April and states that they attempted to drain fluid from her knee but it was too thick so they were unable to. She received steroid injections with significant improvement in her symptoms, but only for 2 days. She has had follow up with Orthopedics since her  visit (Dr. Sreedhar Young) but states she was feeling good at the time because it was right after the steroid injections, so nothing additional was done. She states that x-rays were completed. I do not have any current records to review.    She has tried to do supportive measures at home including buying new supportive shoes and taking ibuprofen.  She states that she is taking 800 mg ibuprofen \"like Tic Tacs\" and it does nothing for the pain.       Current medicines (including changes today)  Current Outpatient Prescriptions   Medication Sig Dispense Refill   • predniSONE (DELTASONE) 20 MG Tab Take 4 tabs daily for 3 days, then 3 tabs daily for 3 days, then 2 tabs daily for 3 days, then 1 tab daily for 3 days, then 1/2 tab daily for 3 days 32 Tab 0   • tramadol (ULTRAM) 50 MG Tab Take 1-2 Tabs by mouth every 8 hours as needed for Severe Pain for up to 5 days. 30 Tab 0   • " "ibuprofen (MOTRIN) 800 MG Tab Take 1 Tab by mouth every 8 hours as needed. 90 Tab 1   • telmisartan-hydrochlorothiazide (MICARDIS HCT) 80-12.5 MG per tablet Take 1 Tab by mouth every day. 90 Tab 2   • metoprolol SR (TOPROL XL) 25 MG TABLET SR 24 HR Take 1 Tab by mouth every day. 90 Tab 2   • amLODIPine (NORVASC) 10 MG Tab Take 1 Tab by mouth every day. 90 Tab 2   • albuterol 108 (90 Base) MCG/ACT Aero Soln inhalation aerosol Inhale 2 Puffs by mouth every 6 hours as needed for Shortness of Breath. 8.5 g 1   • aspirin EC (ECOTRIN) 81 MG Tablet Delayed Response Take 81 mg by mouth every day.     • Multiple Vitamin (DAILY VITAMIN PO) Take 1 Tab by mouth every day.       No current facility-administered medications for this visit.      She  has a past medical history of ASTHMA; Hypertension; and Physiological ovarian cysts. She also has no past medical history of Allergy.    ROS  As per HPI.       Objective:     /82 (BP Location: Right arm, Patient Position: Sitting, BP Cuff Size: Adult)   Pulse 74   Temp 36.2 °C (97.2 °F)   Ht 1.575 m (5' 2\")   Wt 99.3 kg (219 lb)   SpO2 95%  Body mass index is 40.06 kg/m².     Physical Exam:  Constitutional: Alert, appears distressed.  Skin: Warm, dry, good turgor, no rashes in visible areas.  Eye: Pupils are equal and round, conjunctiva clear, lids normal.  ENMT: Lips without lesions, moist mucus membranes.  Respiratory: Unlabored respiratory effort, lungs clear to auscultation, no wheezes, no rhonchi.  Cardiovascular: Normal S1, S2, no murmur, no lower extremity edema.  Musculoskeletal: Bilateral knees without obvious deformity or signs of trauma. No warmth or erythema noted. Knees do appears edematous bilaterally. There is fairly diffuse tenderness over both knees to even light palpation. She endorses pain with active flexion and extension of both knees.      Assessment and Plan:   The following treatment plan was discussed    1. Acute pain of both knees  2. Bilateral " knee swelling    New problems, uncontrolled. Having acute worsening of chronic bilateral knee pain x 2 months. Patient's presentation out of proportion to what I would expect simply from osteoarthritis. There is no history of injury. Thus, I am concerned for possible inflammatory arthropathy. There is no fever or erythema on exam to suggest septic arthritis. Will start on tapering prednisone x 2 weeks with labs to further evaluate for secondary cause. Patient advised to discontinue ibuprofen while she is on the steroids. Will request records from [facility]. I have also provided patient with script for small supply of tramadol to take as-needed given her severe pain. Opiate risk score today is 0. Opiate consent reviewed and signed by patient. Would like to see her back in 2 weeks for re-check.     - URIC ACID; Future  - CCP ANTIBODY; Future  - RHEUMATOID ARTHRITIS FACTOR; Future  - CBC WITH DIFFERENTIAL; Future  - CRP QUANTITIVE (NON-CARDIAC); Future  - WESTERGREN SED RATE; Future  - predniSONE (DELTASONE) 20 MG Tab; Take 4 tabs daily for 3 days, then 3 tabs daily for 3 days, then 2 tabs daily for 3 days, then 1 tab daily for 3 days, then 1/2 tab daily for 3 days  Dispense: 32 Tab; Refill: 0  - tramadol (ULTRAM) 50 MG Tab; Take 1-2 Tabs by mouth every 8 hours as needed for Severe Pain for up to 5 days.  Dispense: 30 Tab; Refill: 0  - Consent for Opiate Prescription      Total 30 minutes face-to-face time spent with patient, with greater than 50% of the total time discussing patient's issues and symptoms as listed above in assessment and plan, as well as managing coordination of care for future evaluation and treatment.      Followup: Return in about 2 weeks (around 7/10/2019).    Xochilt Ricardo P.A.-C.

## 2019-06-27 ENCOUNTER — HOSPITAL ENCOUNTER (OUTPATIENT)
Dept: LAB | Facility: MEDICAL CENTER | Age: 54
End: 2019-06-27
Attending: PHYSICIAN ASSISTANT
Payer: COMMERCIAL

## 2019-06-27 DIAGNOSIS — M25.561 ACUTE PAIN OF BOTH KNEES: ICD-10-CM

## 2019-06-27 DIAGNOSIS — M25.462 BILATERAL KNEE SWELLING: ICD-10-CM

## 2019-06-27 DIAGNOSIS — M25.461 BILATERAL KNEE SWELLING: ICD-10-CM

## 2019-06-27 DIAGNOSIS — M25.562 ACUTE PAIN OF BOTH KNEES: ICD-10-CM

## 2019-06-27 DIAGNOSIS — I10 ESSENTIAL HYPERTENSION: ICD-10-CM

## 2019-06-27 LAB
ALBUMIN SERPL BCP-MCNC: 4.4 G/DL (ref 3.2–4.9)
ALBUMIN/GLOB SERPL: 1.5 G/DL
ALP SERPL-CCNC: 80 U/L (ref 30–99)
ALT SERPL-CCNC: 15 U/L (ref 2–50)
ANION GAP SERPL CALC-SCNC: 9 MMOL/L (ref 0–11.9)
AST SERPL-CCNC: 12 U/L (ref 12–45)
BASOPHILS # BLD AUTO: 0.1 % (ref 0–1.8)
BASOPHILS # BLD: 0.02 K/UL (ref 0–0.12)
BILIRUB SERPL-MCNC: 0.4 MG/DL (ref 0.1–1.5)
BUN SERPL-MCNC: 21 MG/DL (ref 8–22)
CALCIUM SERPL-MCNC: 10.1 MG/DL (ref 8.5–10.5)
CHLORIDE SERPL-SCNC: 103 MMOL/L (ref 96–112)
CHOLEST SERPL-MCNC: 225 MG/DL (ref 100–199)
CO2 SERPL-SCNC: 24 MMOL/L (ref 20–33)
CREAT SERPL-MCNC: 0.61 MG/DL (ref 0.5–1.4)
CRP SERPL HS-MCNC: 0.15 MG/DL (ref 0–0.75)
EOSINOPHIL # BLD AUTO: 0 K/UL (ref 0–0.51)
EOSINOPHIL NFR BLD: 0 % (ref 0–6.9)
ERYTHROCYTE [DISTWIDTH] IN BLOOD BY AUTOMATED COUNT: 44 FL (ref 35.9–50)
ERYTHROCYTE [SEDIMENTATION RATE] IN BLOOD BY WESTERGREN METHOD: 20 MM/HOUR (ref 0–30)
FASTING STATUS PATIENT QL REPORTED: NORMAL
GLOBULIN SER CALC-MCNC: 2.9 G/DL (ref 1.9–3.5)
GLUCOSE SERPL-MCNC: 125 MG/DL (ref 65–99)
HCT VFR BLD AUTO: 49 % (ref 37–47)
HDLC SERPL-MCNC: 63 MG/DL
HGB BLD-MCNC: 16.7 G/DL (ref 12–16)
IMM GRANULOCYTES # BLD AUTO: 0.08 K/UL (ref 0–0.11)
IMM GRANULOCYTES NFR BLD AUTO: 0.5 % (ref 0–0.9)
LDLC SERPL CALC-MCNC: 137 MG/DL
LYMPHOCYTES # BLD AUTO: 1.89 K/UL (ref 1–4.8)
LYMPHOCYTES NFR BLD: 12.2 % (ref 22–41)
MCH RBC QN AUTO: 32.2 PG (ref 27–33)
MCHC RBC AUTO-ENTMCNC: 34.1 G/DL (ref 33.6–35)
MCV RBC AUTO: 94.4 FL (ref 81.4–97.8)
MONOCYTES # BLD AUTO: 0.25 K/UL (ref 0–0.85)
MONOCYTES NFR BLD AUTO: 1.6 % (ref 0–13.4)
NEUTROPHILS # BLD AUTO: 13.29 K/UL (ref 2–7.15)
NEUTROPHILS NFR BLD: 85.6 % (ref 44–72)
NRBC # BLD AUTO: 0 K/UL
NRBC BLD-RTO: 0 /100 WBC
PLATELET # BLD AUTO: 274 K/UL (ref 164–446)
PMV BLD AUTO: 10.8 FL (ref 9–12.9)
POTASSIUM SERPL-SCNC: 4 MMOL/L (ref 3.6–5.5)
PROT SERPL-MCNC: 7.3 G/DL (ref 6–8.2)
RBC # BLD AUTO: 5.19 M/UL (ref 4.2–5.4)
RHEUMATOID FACT SER IA-ACNC: <10 IU/ML (ref 0–14)
SODIUM SERPL-SCNC: 136 MMOL/L (ref 135–145)
TRIGL SERPL-MCNC: 123 MG/DL (ref 0–149)
URATE SERPL-MCNC: 3.5 MG/DL (ref 1.9–8.2)
WBC # BLD AUTO: 15.5 K/UL (ref 4.8–10.8)

## 2019-06-27 PROCEDURE — 80053 COMPREHEN METABOLIC PANEL: CPT

## 2019-06-27 PROCEDURE — 85652 RBC SED RATE AUTOMATED: CPT

## 2019-06-27 PROCEDURE — 80061 LIPID PANEL: CPT

## 2019-06-27 PROCEDURE — 86431 RHEUMATOID FACTOR QUANT: CPT

## 2019-06-27 PROCEDURE — 86200 CCP ANTIBODY: CPT

## 2019-06-27 PROCEDURE — 86140 C-REACTIVE PROTEIN: CPT

## 2019-06-27 PROCEDURE — 84550 ASSAY OF BLOOD/URIC ACID: CPT

## 2019-06-27 PROCEDURE — 36415 COLL VENOUS BLD VENIPUNCTURE: CPT

## 2019-06-27 PROCEDURE — 85025 COMPLETE CBC W/AUTO DIFF WBC: CPT

## 2019-06-29 LAB — CCP IGG SERPL-ACNC: 3 UNITS (ref 0–19)

## 2019-07-11 ENCOUNTER — OFFICE VISIT (OUTPATIENT)
Dept: MEDICAL GROUP | Facility: PHYSICIAN GROUP | Age: 54
End: 2019-07-11
Payer: COMMERCIAL

## 2019-07-11 VITALS
BODY MASS INDEX: 40.3 KG/M2 | HEART RATE: 74 BPM | DIASTOLIC BLOOD PRESSURE: 82 MMHG | TEMPERATURE: 97.6 F | SYSTOLIC BLOOD PRESSURE: 128 MMHG | WEIGHT: 219 LBS | OXYGEN SATURATION: 96 % | HEIGHT: 62 IN

## 2019-07-11 DIAGNOSIS — M25.561 BILATERAL CHRONIC KNEE PAIN: ICD-10-CM

## 2019-07-11 DIAGNOSIS — M25.562 BILATERAL CHRONIC KNEE PAIN: ICD-10-CM

## 2019-07-11 DIAGNOSIS — M17.0 OSTEOARTHRITIS OF BOTH KNEES, UNSPECIFIED OSTEOARTHRITIS TYPE: ICD-10-CM

## 2019-07-11 DIAGNOSIS — G89.29 BILATERAL CHRONIC KNEE PAIN: ICD-10-CM

## 2019-07-11 PROCEDURE — 99214 OFFICE O/P EST MOD 30 MIN: CPT | Performed by: PHYSICIAN ASSISTANT

## 2019-07-11 RX ORDER — DICLOFENAC SODIUM 75 MG/1
75 TABLET, DELAYED RELEASE ORAL 2 TIMES DAILY
Qty: 60 TAB | Refills: 2 | Status: SHIPPED | OUTPATIENT
Start: 2019-07-11 | End: 2019-10-05 | Stop reason: SDUPTHER

## 2019-07-11 NOTE — PROGRESS NOTES
Subjective:   Sharmin Cardozo is a 53 y.o. female here today for follow-up knee pain. Is an established patient of mine.    HPI:    Sharmin returns today to follow-up on bilateral knee pain. I last saw her 2 weeks ago. At that time, was having acute exacerbation of bilateral knee pain over the last couple of months unrelieved with high doses of ibuprofen.    She has been evaluated by orthopedics back in April, who did bilateral x-rays and right steroid knee injections which were helpful only for about a week. Per patient fluid aspiration was attempted but unsuccessful.     At last visit, I started her on tapering prednisone which she has since completed. I also provided her with small supply of tramadol to take as-needed. Full lab panel was ordered which she has completed. Inflammatory markers were normal and RF/CCP were both negative.  Overall, she feels that her knee pain is essentially unchanged.  She does not feel that the steroids helped at all.  The tramadol is helpful, but has been trying to limit as much as possible.  States has only taken 3 to 4 tablets since I last saw her.  Continues to deny fever, chills, malaise.  Did have recent GI illness with nausea and vomiting which she states has resolved.  She also states that since last visit, she has noticed a localized lump on her right ankle area that she would like me to look at.      Current medicines (including changes today)  Current Outpatient Prescriptions   Medication Sig Dispense Refill   • diclofenac EC (VOLTAREN) 75 MG Tablet Delayed Response Take 1 Tab by mouth 2 times a day. 60 Tab 2   • telmisartan-hydrochlorothiazide (MICARDIS HCT) 80-12.5 MG per tablet Take 1 Tab by mouth every day. 90 Tab 2   • metoprolol SR (TOPROL XL) 25 MG TABLET SR 24 HR Take 1 Tab by mouth every day. 90 Tab 2   • amLODIPine (NORVASC) 10 MG Tab Take 1 Tab by mouth every day. 90 Tab 2   • albuterol 108 (90 Base) MCG/ACT Aero Soln inhalation aerosol Inhale 2 Puffs by mouth  "every 6 hours as needed for Shortness of Breath. 8.5 g 1   • aspirin EC (ECOTRIN) 81 MG Tablet Delayed Response Take 81 mg by mouth every day.     • Multiple Vitamin (DAILY VITAMIN PO) Take 1 Tab by mouth every day.       No current facility-administered medications for this visit.      She  has a past medical history of ASTHMA; Hypertension; and Physiological ovarian cysts. She also has no past medical history of Allergy.    ROS  As per HPI.       Objective:     /82 (BP Location: Left arm, Patient Position: Sitting, BP Cuff Size: Large adult)   Pulse 74   Temp 36.4 °C (97.6 °F)   Ht 1.575 m (5' 2\")   Wt 99.3 kg (219 lb)   SpO2 96%  Body mass index is 40.06 kg/m².     Physical Exam:  Constitutional: Alert, well-appearing, pleasant, no distress.  Skin: Warm, dry, good turgor, no rashes in visible areas.  Eye: Pupils are equal and round, conjunctiva clear, lids normal.  ENMT: Lips without lesions, moist mucus membranes.  Musculoskeletal: Bilateral knees again without obvious deformity.  There is a small area of ecchymosis on the inferior medial left knee.  No erythema or warmth.  Perhaps some mild edema, but appears improved compared to last exam.  There is no longer tenderness over bilateral knees.      Assessment and Plan:   The following treatment plan was discussed    1. Bilateral chronic knee pain  Established problem, still uncontrolled.  She does appear somewhat improved from the last time I saw her.  Given that she is off steroids now, will have her start on anti-inflammatory medication. I have prescribed diclofenac  She does have known arthritis.  Given this as well as her worsening knee pain over the last couple of months, recommend that she see orthopedics again.  She plans to go back to the same orthopedist she saw back in April.  I have placed a referral for her.  Explained that they may attempt aspiration again.  Her lab work is not suggestive of inflammatory arthritis including RA or gout.  - " diclofenac EC (VOLTAREN) 75 MG Tablet Delayed Response; Take 1 Tab by mouth 2 times a day.  Dispense: 60 Tab; Refill: 2  - REFERRAL TO ORTHOPEDICS    2. Osteoarthritis of both knees, unspecified osteoarthritis type  Established problem, uncontrolled pain.  Unclear if her current presentation is solely from arthritis or if there is some sort of soft tissue issue also going on in her knees.  Regardless, feel that she should see orthopedics as described above.  - diclofenac EC (VOLTAREN) 75 MG Tablet Delayed Response; Take 1 Tab by mouth 2 times a day.  Dispense: 60 Tab; Refill: 2  - REFERRAL TO ORTHOPEDICS      Followup: Return in about 1 month (around 8/11/2019) for f/u chronic conditions; Short.    Xochilt Ricardo P.A.-C.

## 2019-08-02 ENCOUNTER — OFFICE VISIT (OUTPATIENT)
Dept: URGENT CARE | Facility: PHYSICIAN GROUP | Age: 54
End: 2019-08-02
Payer: COMMERCIAL

## 2019-08-02 VITALS
WEIGHT: 229 LBS | OXYGEN SATURATION: 97 % | TEMPERATURE: 98 F | SYSTOLIC BLOOD PRESSURE: 128 MMHG | HEART RATE: 68 BPM | BODY MASS INDEX: 42.14 KG/M2 | RESPIRATION RATE: 18 BRPM | HEIGHT: 62 IN | DIASTOLIC BLOOD PRESSURE: 84 MMHG

## 2019-08-02 DIAGNOSIS — M25.562 ACUTE PAIN OF LEFT KNEE: ICD-10-CM

## 2019-08-02 PROCEDURE — 99214 OFFICE O/P EST MOD 30 MIN: CPT | Performed by: PHYSICIAN ASSISTANT

## 2019-08-02 RX ORDER — KETOROLAC TROMETHAMINE 30 MG/ML
30 INJECTION, SOLUTION INTRAMUSCULAR; INTRAVENOUS ONCE
Status: COMPLETED | OUTPATIENT
Start: 2019-08-02 | End: 2019-08-02

## 2019-08-02 RX ADMIN — KETOROLAC TROMETHAMINE 30 MG: 30 INJECTION, SOLUTION INTRAMUSCULAR; INTRAVENOUS at 09:42

## 2019-08-02 ASSESSMENT — ENCOUNTER SYMPTOMS: LEG PAIN: 1

## 2019-08-02 NOTE — PROGRESS NOTES
"Subjective:      Sharmin Cardozo is a 53 y.o. female who presents with Leg Pain (L leg pain, from knee down, pt states it was swelling last night, painful to walk, x1 year )            Patient is a 53-year-old female who presents to urgent care with worsening left knee pain and lower leg pain for the last several months.  She does report worsening pain to her leg last night.  Patient was previously started on a taper of steroid approximately 1 month ago of which she does report minimal relief his symptoms.  She then was started on diclofenac of which this is mildly helping as well.  She denies any new fall, trauma.  She denies any increased warmth, redness.  She does report slight swelling to the left ankle however does have this off and on.  She reports the pain today is slightly radiating down the front of her shin.  Specifically she denies any calf pain or swelling.    Leg Pain   The current episode started more than 1 month ago. The problem occurs intermittently. The problem has been waxing and waning. Associated symptoms include joint swelling. The symptoms are aggravated by walking and standing. Treatments tried: As above.  The treatment provided mild relief.       Review of Systems   Musculoskeletal: Positive for joint pain and joint swelling. Negative for falls.   Neurological: Negative for tingling and sensory change.   All other systems reviewed and are negative.         Objective:     /84 (BP Location: Left arm, Patient Position: Sitting, BP Cuff Size: Large adult)   Pulse 68   Temp 36.7 °C (98 °F) (Temporal)   Resp 18   Ht 1.575 m (5' 2\")   Wt 103.9 kg (229 lb)   LMP 04/17/2008   SpO2 97%   BMI 41.88 kg/m²    PMH:  has a past medical history of ASTHMA, Hypertension, and Physiological ovarian cysts. She also has no past medical history of Allergy.  MEDS:   Current Outpatient Medications:   •  diclofenac EC (VOLTAREN) 75 MG Tablet Delayed Response, Take 1 Tab by mouth 2 times a day., Disp: " 60 Tab, Rfl: 2  •  telmisartan-hydrochlorothiazide (MICARDIS HCT) 80-12.5 MG per tablet, Take 1 Tab by mouth every day., Disp: 90 Tab, Rfl: 2  •  metoprolol SR (TOPROL XL) 25 MG TABLET SR 24 HR, Take 1 Tab by mouth every day., Disp: 90 Tab, Rfl: 2  •  amLODIPine (NORVASC) 10 MG Tab, Take 1 Tab by mouth every day., Disp: 90 Tab, Rfl: 2  •  albuterol 108 (90 Base) MCG/ACT Aero Soln inhalation aerosol, Inhale 2 Puffs by mouth every 6 hours as needed for Shortness of Breath., Disp: 8.5 g, Rfl: 1  •  aspirin EC (ECOTRIN) 81 MG Tablet Delayed Response, Take 81 mg by mouth every day., Disp: , Rfl:   •  Multiple Vitamin (DAILY VITAMIN PO), Take 1 Tab by mouth every day., Disp: , Rfl:   ALLERGIES:   Allergies   Allergen Reactions   • Bactrim Rash     Rxn - about 2016     • Iodine Nausea   • Tetanus Toxoid Rash and Swelling     Rash & swelling from tetanus vaccine      SURGHX:   Past Surgical History:   Procedure Laterality Date   • CYSTOSCOPY  3/24/2009    Performed by ZORAN BRIGGS at SURGERY SAME DAY [facility] ORS   • VAGINAL HYSTERECTOMY SCOPE TOTAL  3/24/2009    Performed by ZORAN BRIGGS at SURGERY SAME DAY [facility]   • COMPL.ULNAR NERVE TRANSPOSITION W/ POSS AUTOGRAFT  2003   • MASS EXCISION GENERAL Left 2003    left forearm - benign per patient   • CHOLECYSTECTOMY     • PB VAG DELIV ONLY,PBEV C-SECTN      x 4   • TUBAL LIGATION       SOCHX:  reports that she has been smoking cigarettes. She has been smoking about 0.25 packs per day. She has never used smokeless tobacco. She reports that she does not drink alcohol or use drugs.  FH: Family history was reviewed, no pertinent findings to report    Physical Exam   Constitutional: She is oriented to person, place, and time. She appears well-developed and well-nourished. No distress.   HENT:   Head: Normocephalic and atraumatic.   Eyes: Pupils are equal, round, and reactive to light. Conjunctivae and EOM are normal.   Neck: Normal range of motion. Neck supple. No  tracheal deviation present.   Cardiovascular: Normal rate.   Pulmonary/Chest: Effort normal. No respiratory distress.   Musculoskeletal:        Left knee: She exhibits decreased range of motion. She exhibits no effusion, no erythema and no bony tenderness. Tenderness found. Medial joint line and lateral joint line tenderness noted.        Legs:  Minimal swelling.   Gait antalgic. Without laxity to the joint. Calf without specific tenderness.   BLE- trace edema.    Neurological: She is alert and oriented to person, place, and time.   Skin: Skin is warm. No rash noted.   Psychiatric: She has a normal mood and affect. Her behavior is normal. Judgment and thought content normal.   Vitals reviewed.              Assessment/Plan:     1. Acute pain of left knee  - ketorolac (TORADOL) injection 30 mg    Patient is to hold the Voltaren for the next 24 hours we will trial ketorolac to see if any improvement.  Patient was given referral for [facility] of which it does not appear that she is scheduled at this time.  She is to call and make an appointment today.  Patient given precautionary s/sx that mandate immediate follow up and evaluation in the ED. Advised of risks of not doing so.    DDX, Supportive care, and indications for immediate follow-up discussed with patient.    Instructed to return to clinic or nearest emergency department if we are not available for any change in condition, further concerns, or worsening of symptoms.    The patient demonstrated a good understanding and agreed with the treatment plan.  Please note that this dictation was created using voice recognition software. I have made every reasonable attempt to correct obvious errors, but I expect that there are errors of grammar and possibly content that I did not discover before finalizing the note.

## 2019-08-04 ASSESSMENT — ENCOUNTER SYMPTOMS
FALLS: 0
SENSORY CHANGE: 0
JOINT SWELLING: 1
TINGLING: 0

## 2019-08-21 ENCOUNTER — OFFICE VISIT (OUTPATIENT)
Dept: MEDICAL GROUP | Facility: PHYSICIAN GROUP | Age: 54
End: 2019-08-21
Payer: COMMERCIAL

## 2019-08-21 VITALS
HEIGHT: 62 IN | BODY MASS INDEX: 41.96 KG/M2 | HEART RATE: 74 BPM | TEMPERATURE: 98 F | WEIGHT: 228 LBS | DIASTOLIC BLOOD PRESSURE: 78 MMHG | OXYGEN SATURATION: 97 % | SYSTOLIC BLOOD PRESSURE: 114 MMHG

## 2019-08-21 DIAGNOSIS — E78.5 DYSLIPIDEMIA: ICD-10-CM

## 2019-08-21 DIAGNOSIS — M17.0 PRIMARY OSTEOARTHRITIS OF BOTH KNEES: ICD-10-CM

## 2019-08-21 DIAGNOSIS — F17.210 NICOTINE DEPENDENCE, CIGARETTES, UNCOMPLICATED: ICD-10-CM

## 2019-08-21 DIAGNOSIS — I10 ESSENTIAL HYPERTENSION: ICD-10-CM

## 2019-08-21 DIAGNOSIS — J44.89 COPD WITH ASTHMA: ICD-10-CM

## 2019-08-21 DIAGNOSIS — D72.829 LEUKOCYTOSIS, UNSPECIFIED TYPE: ICD-10-CM

## 2019-08-21 PROCEDURE — 99214 OFFICE O/P EST MOD 30 MIN: CPT | Performed by: PHYSICIAN ASSISTANT

## 2019-08-21 NOTE — PROGRESS NOTES
Subjective:   Sharmin Cardozo is a 54 y.o. female here today for follow-up on knee pain another chronic conditions. Is an established patient of mine.    HPI:    Sharmin presents to the office today for routine follow-up on chronic health conditions. I last saw her in June and July, both times for bilateral knee pain. She was recently seen in  again for issue and states she saw orthopedics (Dr. Young) on 8/7. Received steroid injections with minimal improvement. Also continues to take the diclofenac that I prescribed. Expresses frustration because she was told that she needed knee replacement surgery and she can't afford it.     Other chronic conditions include the following:    -hypertension -treated with telmisartan-hydrochlorothiazide 80-12.5 mg daily, metoprolol SR 25 mg daily, and amlodipine 10 mg daily.  Blood pressure today in the office is 114/78. States runs similarly at home when she checks.    -hyperlipidemia - noted on recent lab work done in June. Total cholesterol/LDL both elevated. Not currently on any cholesterol medication.    -COPD/asthma - currently treated with only PRN albuterol nebulizer treatments. [facility] brings her nebulizer machine to work every day in case she needs a treatment. Experiences dyspnea on exertion on occasion. Unable to afford albuterol inhalers. Was also previously on Symbicort but cannot afford this either. She does mention that she has been cutting back on her smoking. 1 pack is lasting her about a week. It is her goal to continue to cut back.      Current medicines (including changes today)  Current Outpatient Medications   Medication Sig Dispense Refill   • diclofenac EC (VOLTAREN) 75 MG Tablet Delayed Response Take 1 Tab by mouth 2 times a day. 60 Tab 2   • telmisartan-hydrochlorothiazide (MICARDIS HCT) 80-12.5 MG per tablet Take 1 Tab by mouth every day. 90 Tab 2   • metoprolol SR (TOPROL XL) 25 MG TABLET SR 24 HR Take 1 Tab by mouth every day. 90 Tab 2   • amLODIPine  "(NORVASC) 10 MG Tab Take 1 Tab by mouth every day. 90 Tab 2   • albuterol 108 (90 Base) MCG/ACT Aero Soln inhalation aerosol Inhale 2 Puffs by mouth every 6 hours as needed for Shortness of Breath. 8.5 g 1   • aspirin EC (ECOTRIN) 81 MG Tablet Delayed Response Take 81 mg by mouth every day.     • Multiple Vitamin (DAILY VITAMIN PO) Take 1 Tab by mouth every day.       No current facility-administered medications for this visit.      She  has a past medical history of ASTHMA, Hypertension, and Physiological ovarian cysts. She also has no past medical history of Allergy.    ROS  +intermittent chest pain - previous neg work-up  +intermittent dyspnea  +chronic knee pain       Objective:     /78 (BP Location: Right arm, Patient Position: Sitting, BP Cuff Size: Large adult)   Pulse 74   Temp 36.7 °C (98 °F)   Ht 1.575 m (5' 2\")   Wt 103.4 kg (228 lb)   SpO2 97%  Body mass index is 41.7 kg/m².     Physical Exam:  Constitutional: Alert, no distress.  Skin: Warm, dry, good turgor, no rashes in visible areas.  Eye: Pupils are equal and round, conjunctiva clear, lids normal.  ENMT: Lips without lesions, moist mucus membranes.  Respiratory: Unlabored respiratory effort, lungs clear to auscultation, no wheezes, no rhonchi.  Cardiovascular: Normal S1, S2, no murmur, no lower extremity edema.      Assessment and Plan:   The following treatment plan was discussed    1. Primary osteoarthritis of both knees  Chronic issue, uncontrolled but with some improvement with steroid injections/diclofenac. Recommend continued follow-up with orthopedics.    2. Essential hypertension  Chronic issue, well-controlled with current medication regimen. BP remains at-goal. Screening lab work is up-to-date. Continue current management.  - Comp Metabolic Panel; Future    3. Dyslipidemia  Chronic issue, uncontrolled. Discussed recent lipid profile. Discussed lifestyle recommendations to improve cholesterol levels.  - Lipid Profile; " Future    4. COPD with asthma (HCC)  Chronic issue, uncontrolled. Should ideally be on maintenance inhaler but unable to afford. Will continue to use albuterol nebulizer treatments on as-needed basis.    5. Nicotine dependence, cigarettes, uncomplicated  Chronic issue, uncontrolled, but improving. Plans to continue to cut back.    6. Leukocytosis, unspecified type  Established problem, needing follow-up. Suspect previously high WBC related to recent steroids. Will re-check CBC prior to next appointment.  - CBC WITH DIFFERENTIAL; Future      Followup: Return in about 6 months (around 2/21/2020).    Xochilt Ricardo P.A.-C.

## 2019-10-05 DIAGNOSIS — M17.0 OSTEOARTHRITIS OF BOTH KNEES, UNSPECIFIED OSTEOARTHRITIS TYPE: ICD-10-CM

## 2019-10-05 DIAGNOSIS — G89.29 BILATERAL CHRONIC KNEE PAIN: ICD-10-CM

## 2019-10-05 DIAGNOSIS — M25.562 BILATERAL CHRONIC KNEE PAIN: ICD-10-CM

## 2019-10-05 DIAGNOSIS — I10 ESSENTIAL HYPERTENSION: ICD-10-CM

## 2019-10-05 DIAGNOSIS — M25.561 BILATERAL CHRONIC KNEE PAIN: ICD-10-CM

## 2019-10-07 RX ORDER — METOPROLOL SUCCINATE 25 MG/1
TABLET, EXTENDED RELEASE ORAL
Qty: 90 TAB | Refills: 1 | Status: SHIPPED | OUTPATIENT
Start: 2019-10-07 | End: 2020-03-27

## 2019-10-07 RX ORDER — TELMISARTAN AND HYDROCHLORTHIAZIDE 80; 12.5 MG/1; MG/1
TABLET ORAL
Qty: 90 TAB | Refills: 1 | Status: SHIPPED | OUTPATIENT
Start: 2019-10-07 | End: 2020-03-27

## 2019-10-07 RX ORDER — DICLOFENAC SODIUM 75 MG/1
TABLET, DELAYED RELEASE ORAL
Qty: 180 TAB | Refills: 0 | Status: SHIPPED | OUTPATIENT
Start: 2019-10-07 | End: 2020-01-06

## 2019-10-07 NOTE — TELEPHONE ENCOUNTER
Was the patient seen in the last year in this department? Yes    Does patient have an active prescription for medications requested? No     Received Request Via: Pharmacy      Pt met protocol?: Yes   Pt last ov 8/2019    BP Readings from Last 1 Encounters:   08/21/19 114/78     Lab Results   Component Value Date/Time    SODIUM 136 06/27/2019 09:48 AM    POTASSIUM 4.0 06/27/2019 09:48 AM    CHLORIDE 103 06/27/2019 09:48 AM    CO2 24 06/27/2019 09:48 AM    GLUCOSE 125 (H) 06/27/2019 09:48 AM    BUN 21 06/27/2019 09:48 AM    CREATININE 0.61 06/27/2019 09:48 AM    CREATININE 0.9 03/19/2009 01:20 PM

## 2019-10-07 NOTE — TELEPHONE ENCOUNTER
Refill X 6 months, sent to pharmacy.Pt. Seen in the last 6 months per protocol.   Lab Results   Component Value Date/Time    SODIUM 136 06/27/2019 09:48 AM    POTASSIUM 4.0 06/27/2019 09:48 AM    CHLORIDE 103 06/27/2019 09:48 AM    CO2 24 06/27/2019 09:48 AM    GLUCOSE 125 (H) 06/27/2019 09:48 AM    BUN 21 06/27/2019 09:48 AM    CREATININE 0.61 06/27/2019 09:48 AM    CREATININE 0.9 03/19/2009 01:20 PM

## 2019-11-24 ENCOUNTER — APPOINTMENT (OUTPATIENT)
Dept: RADIOLOGY | Facility: IMAGING CENTER | Age: 54
End: 2019-11-24
Attending: PHYSICIAN ASSISTANT
Payer: COMMERCIAL

## 2019-11-24 ENCOUNTER — OFFICE VISIT (OUTPATIENT)
Dept: URGENT CARE | Facility: PHYSICIAN GROUP | Age: 54
End: 2019-11-24
Payer: COMMERCIAL

## 2019-11-24 VITALS
DIASTOLIC BLOOD PRESSURE: 72 MMHG | WEIGHT: 228 LBS | TEMPERATURE: 97.8 F | HEIGHT: 62 IN | HEART RATE: 64 BPM | OXYGEN SATURATION: 96 % | BODY MASS INDEX: 41.96 KG/M2 | SYSTOLIC BLOOD PRESSURE: 112 MMHG

## 2019-11-24 DIAGNOSIS — R06.02 SHORTNESS OF BREATH: ICD-10-CM

## 2019-11-24 DIAGNOSIS — J06.9 VIRAL URI WITH COUGH: ICD-10-CM

## 2019-11-24 DIAGNOSIS — A08.4 VIRAL GASTROENTERITIS: ICD-10-CM

## 2019-11-24 PROCEDURE — 99213 OFFICE O/P EST LOW 20 MIN: CPT | Performed by: PHYSICIAN ASSISTANT

## 2019-11-24 PROCEDURE — 71046 X-RAY EXAM CHEST 2 VIEWS: CPT | Mod: TC | Performed by: PHYSICIAN ASSISTANT

## 2019-11-24 ASSESSMENT — ENCOUNTER SYMPTOMS
SHORTNESS OF BREATH: 1
NAUSEA: 1
SPUTUM PRODUCTION: 1
EYE REDNESS: 0
SORE THROAT: 1
COUGH: 1
VOMITING: 1
ABDOMINAL PAIN: 0
HEADACHES: 1
FEVER: 0
EYE DISCHARGE: 0
CHANGE IN BOWEL HABIT: 1

## 2019-11-24 NOTE — PROGRESS NOTES
Subjective:      Sharmin Cardozo is a 54 y.o. female who presents with Nausea (nausea, diarrhea x5 days ) and Shortness of Breath (SOB x5 days )        Nausea   This is a new problem. Episode onset: x 5 days ago. The problem occurs constantly. The problem has been unchanged. Associated symptoms include a change in bowel habit (The patient reports intermittent diarrhea.), congestion, coughing, headaches, nausea, a sore throat and vomiting (The patient reports 3-4 episdoes of vomiting.). Pertinent negatives include no abdominal pain, chest pain, fever, rash or urinary symptoms. The symptoms are aggravated by eating and drinking. She has tried nothing for the symptoms.   Shortness of Breath   This is a new problem. Episode onset: x 5 days ago. The problem occurs constantly. The problem has been unchanged. Associated symptoms include headaches, a sore throat, sputum production and vomiting (The patient reports 3-4 episdoes of vomiting.). Pertinent negatives include no abdominal pain, chest pain, ear pain, fever, leg swelling or rash. Treatments tried: nebulizer treatment. Her past medical history is significant for asthma.     The patient reports nausea after eating and drinking. The patient states she has had 3-4 episodes of vomiting. The patient also reports intermittent diarrhea. The patient denies abdominal pain. No fever.     The patient is also reports a cough with associated shortness of breath. The patient reports a history of Asthma. The patient has used her nebulizer treatment for her current symptoms. No fever. No chest pain. No leg swelling.       PMH:  has a past medical history of ASTHMA, Hypertension, and Physiological ovarian cysts. She also has no past medical history of Allergy.  MEDS:   Current Outpatient Medications:   •  telmisartan-hydrochlorothiazide (MICARDIS HCT) 80-12.5 MG per tablet, TAKE 1 TABLET BY MOUTH ONCE DAILY, Disp: 90 Tab, Rfl: 1  •  metoprolol SR (TOPROL XL) 25 MG TABLET SR 24 HR,  TAKE 1 TABLET BY MOUTH ONCE DAILY, Disp: 90 Tab, Rfl: 1  •  diclofenac EC (VOLTAREN) 75 MG Tablet Delayed Response, TAKE 1 TABLET BY MOUTH TWICE DAILY, Disp: 180 Tab, Rfl: 0  •  amLODIPine (NORVASC) 10 MG Tab, Take 1 Tab by mouth every day., Disp: 90 Tab, Rfl: 2  •  albuterol 108 (90 Base) MCG/ACT Aero Soln inhalation aerosol, Inhale 2 Puffs by mouth every 6 hours as needed for Shortness of Breath., Disp: 8.5 g, Rfl: 1  •  aspirin EC (ECOTRIN) 81 MG Tablet Delayed Response, Take 81 mg by mouth every day., Disp: , Rfl:   •  Multiple Vitamin (DAILY VITAMIN PO), Take 1 Tab by mouth every day., Disp: , Rfl:   ALLERGIES:   Allergies   Allergen Reactions   • Bactrim Rash     Rxn - about 2016     • Iodine Nausea   • Tetanus Toxoid Rash and Swelling     Rash & swelling from tetanus vaccine      SURGHX:   Past Surgical History:   Procedure Laterality Date   • CYSTOSCOPY  3/24/2009    Performed by ZORAN BRIGGS at [facility]   • VAGINAL HYSTERECTOMY SCOPE TOTAL  3/24/2009    Performed by ZORAN BRIGGS at [facility]   • COMPL.ULNAR NERVE TRANSPOSITION W/ POSS AUTOGRAFT  2003   • MASS EXCISION GENERAL Left 2003    left forearm - benign per patient   • CHOLECYSTECTOMY     • PB VAG DELIV ONLY,PBEV C-SECTN      x 4   • TUBAL LIGATION       SOCHX:  reports that she has been smoking cigarettes. She has been smoking about 0.25 packs per day. She has never used smokeless tobacco. She reports that she does not drink alcohol or use drugs.  FH: Family history was reviewed, no pertinent findings to report      Review of Systems   Constitutional: Negative for fever.   HENT: Positive for congestion and sore throat. Negative for ear pain.    Eyes: Negative for discharge and redness.   Respiratory: Positive for cough, sputum production and shortness of breath.    Cardiovascular: Negative for chest pain and leg swelling.   Gastrointestinal: Positive for change in bowel habit (The patient reports  "intermittent diarrhea.), nausea and vomiting (The patient reports 3-4 episdoes of vomiting.). Negative for abdominal pain.   Skin: Negative for rash.   Neurological: Positive for headaches.          Objective:     /72 (BP Location: Right arm, Patient Position: Sitting, BP Cuff Size: Large adult)   Pulse 64   Temp 36.6 °C (97.8 °F)   Ht 1.575 m (5' 2\")   Wt 103.4 kg (228 lb)   LMP 04/17/2008   SpO2 96%   BMI 41.70 kg/m²      Physical Exam  Constitutional:       General: She is not in acute distress.     Appearance: Normal appearance.   HENT:      Head: Normocephalic and atraumatic.      Right Ear: Tympanic membrane, ear canal and external ear normal.      Left Ear: Tympanic membrane, ear canal and external ear normal.      Nose: Nose normal.      Mouth/Throat:      Mouth: Mucous membranes are moist.      Pharynx: Oropharynx is clear. No posterior oropharyngeal erythema.   Eyes:      Extraocular Movements: Extraocular movements intact.      Conjunctiva/sclera: Conjunctivae normal.   Neck:      Musculoskeletal: Normal range of motion and neck supple.   Cardiovascular:      Rate and Rhythm: Normal rate and regular rhythm.      Heart sounds: Normal heart sounds.   Pulmonary:      Effort: Pulmonary effort is normal. No respiratory distress.      Breath sounds: Normal breath sounds. No wheezing.   Abdominal:      General: Bowel sounds are normal.      Palpations: Abdomen is soft.      Tenderness: There is no tenderness.   Musculoskeletal: Normal range of motion.   Skin:     General: Skin is warm and dry.   Neurological:      Mental Status: She is alert and oriented to person, place, and time.            Progress:  CXR:  FINDINGS:  Cardiomediastinal contours are stable with mild aortic ectasia and borderline cardiac silhouette enlargement.     No pulmonary consolidation is seen.     No pleural space process is evident.     IMPRESSION:     No radiographic evidence of acute cardiopulmonary process.        The " patient declined a breathing treatment today in clinic.     The patient also declined an EKG today in clinic.      Assessment/Plan:     1. Viral gastroenteritis    2. Viral URI with cough  - DX-CHEST-2 VIEWS; Future    The patient's presenting symptoms and physical exam findings are consistent with gastroenteritis and URI-like symptoms with an associated cough likely secondary to a viral illness.  The patient's physical exam today in clinic was normal.  The patient had no abdominal tenderness and her bowel sounds were normal.  Based on the patient's presenting symptoms and physical exam findings, it is unlikely the patient symptoms are due to an acute abdominal process.  The patient's lungs were clear to auscultation without wheezing and her pulse ox was within normal limits.  The patient's chest x-ray today in clinic showed no radiographic evidence of acute cardiopulmonary process.  The patient declined a breathing treatment today in clinic for her associated shortness of breath.  The patient also declined an EKG for further evaluation of her current symptoms.  Advised the patient of the associated risks of not obtaining an EKG today in clinic, including permanent morbidity and/or mortality. The patient verbalized understanding.  Discussed likely viral etiology as the cause of the patient's symptoms.  Recommend OTC medications and supportive care for symptomatic management.  Recommend patient follow-up with her PCP.  Discussed return precautions with patient, and she verbalized understanding.    Differential diagnoses, supportive care, and indications for immediate follow-up discussed with patient.   Instructed to return to clinic or nearest emergency department for any change in condition, further concerns, or worsening of symptoms.    Continue Nebulizer as prescribed   OTC Tylenol or Motrin for fever/discomfort.  OTC cough/cold medication for symptomatic relief  OTC Supportive Care for Congestion - saline nasal  spray or neti pot  Drink plenty of fluids  -- Recommend small sips of clear fluids  Wisner diet  Follow-up with PCP  Return to clinic or go to the ED if symptoms worsen or fail to improve, or if the patient should develop worsening/increasing cough, congestion, ear pain, sore throat, shortness of breath, wheezing, chest pain, worsening/increasing/persistent abdominal pain, nausea/vomiting, diarrhea, inability to tolerate p.o. fluids, fever/chills, and/or any concerning symptoms.    Discussed plan with the patient, and she agrees to the above.

## 2019-12-26 DIAGNOSIS — M25.761 OSTEOPHYTE OF RIGHT KNEE: ICD-10-CM

## 2019-12-27 RX ORDER — IBUPROFEN 800 MG/1
800 TABLET ORAL EVERY 8 HOURS PRN
Qty: 90 TAB | Refills: 1 | OUTPATIENT
Start: 2019-12-27

## 2019-12-27 NOTE — TELEPHONE ENCOUNTER
Per our records, she is taking diclofenac. Cannot take both ibuprofen and diclofenac together. Please verify with her. If she is not taking the diclofenac anymore I will refill the ibuprofen.  Xochilt Ricardo P.A.-C.

## 2020-01-20 ENCOUNTER — APPOINTMENT (OUTPATIENT)
Dept: URGENT CARE | Facility: PHYSICIAN GROUP | Age: 55
End: 2020-01-20
Payer: COMMERCIAL

## 2020-02-11 ENCOUNTER — NON-PROVIDER VISIT (OUTPATIENT)
Dept: URGENT CARE | Facility: PHYSICIAN GROUP | Age: 55
End: 2020-02-11

## 2020-02-11 DIAGNOSIS — Z02.1 PRE-EMPLOYMENT DRUG SCREENING: ICD-10-CM

## 2020-02-11 PROCEDURE — 7503 PR ESCREEN ACCT UDS COL ONLY: Performed by: PHYSICIAN ASSISTANT

## 2020-02-26 ENCOUNTER — OFFICE VISIT (OUTPATIENT)
Dept: MEDICAL GROUP | Facility: PHYSICIAN GROUP | Age: 55
End: 2020-02-26
Payer: COMMERCIAL

## 2020-02-26 VITALS
DIASTOLIC BLOOD PRESSURE: 84 MMHG | TEMPERATURE: 98.4 F | OXYGEN SATURATION: 96 % | HEART RATE: 64 BPM | HEIGHT: 62 IN | BODY MASS INDEX: 39.93 KG/M2 | WEIGHT: 217 LBS | SYSTOLIC BLOOD PRESSURE: 134 MMHG

## 2020-02-26 DIAGNOSIS — M17.0 PRIMARY OSTEOARTHRITIS OF BOTH KNEES: ICD-10-CM

## 2020-02-26 DIAGNOSIS — I10 ESSENTIAL HYPERTENSION: ICD-10-CM

## 2020-02-26 DIAGNOSIS — E66.9 OBESITY (BMI 30-39.9): ICD-10-CM

## 2020-02-26 DIAGNOSIS — J44.89 COPD WITH ASTHMA (HCC): ICD-10-CM

## 2020-02-26 DIAGNOSIS — E78.00 PURE HYPERCHOLESTEROLEMIA: ICD-10-CM

## 2020-02-26 DIAGNOSIS — F17.210 NICOTINE DEPENDENCE, CIGARETTES, UNCOMPLICATED: ICD-10-CM

## 2020-02-26 DIAGNOSIS — Z12.39 ENCOUNTER FOR SCREENING FOR MALIGNANT NEOPLASM OF BREAST: ICD-10-CM

## 2020-02-26 DIAGNOSIS — D72.829 LEUKOCYTOSIS, UNSPECIFIED TYPE: ICD-10-CM

## 2020-02-26 PROBLEM — E78.5 DYSLIPIDEMIA: Status: RESOLVED | Noted: 2017-03-01 | Resolved: 2020-02-26

## 2020-02-26 PROBLEM — R55 VASOVAGAL SYNCOPE: Status: RESOLVED | Noted: 2018-09-13 | Resolved: 2020-02-26

## 2020-02-26 PROBLEM — R07.2 PRECORDIAL PAIN: Status: RESOLVED | Noted: 2017-02-02 | Resolved: 2020-02-26

## 2020-02-26 PROCEDURE — 99214 OFFICE O/P EST MOD 30 MIN: CPT | Performed by: PHYSICIAN ASSISTANT

## 2020-02-26 RX ORDER — MOMETASONE FUROATE AND FORMOTEROL FUMARATE DIHYDRATE 100; 5 UG/1; UG/1
2 AEROSOL RESPIRATORY (INHALATION) 2 TIMES DAILY
Qty: 2 INHALER | Refills: 0 | Status: SHIPPED | OUTPATIENT
Start: 2020-02-26 | End: 2022-04-25

## 2020-02-26 RX ORDER — AMLODIPINE BESYLATE 10 MG/1
TABLET ORAL
COMMUNITY
Start: 2020-01-06 | End: 2020-02-26

## 2020-02-26 RX ORDER — TELMISARTAN AND HYDROCHLORTHIAZIDE 80; 12.5 MG/1; MG/1
TABLET ORAL
COMMUNITY
Start: 2020-01-06 | End: 2020-02-26

## 2020-02-26 RX ORDER — METOPROLOL SUCCINATE 25 MG/1
TABLET, EXTENDED RELEASE ORAL
COMMUNITY
Start: 2020-01-06 | End: 2020-02-26

## 2020-02-26 RX ORDER — DICLOFENAC SODIUM 75 MG/1
TABLET, DELAYED RELEASE ORAL
COMMUNITY
Start: 2020-01-06 | End: 2020-02-26

## 2020-02-26 NOTE — PROGRESS NOTES
Subjective:   Sharmin Cardozo is a 54 y.o. female here today for follow-up on hypertension and other medical problems. Is an established patient of mine.    HPI:    Patient presents to the office today for follow-up on chronic medical conditions.  Last visit with me was in August 2019.    Since last visit, has continued to have significant bilateral knee pain. She has osteoarthritis. Has already seen orthopedics and had joint injection which she didn't find that helpful and cost her a lot of money so doesn't want to repeat. She has been offered surgery but doesn't want to pursue due to financial reasons. She is taking diclofenac daily but states it doesn't help. Has tried neoprene knee sleeves and topical pain relievers without relief. She states she is just living with the pain.    Patient continues on telmisartan-hydrochlorothiazide 80-12.5 mg daily, metoprolol SR 25 mg daily, and amlodipine 10 mg daily for her blood pressure.  Blood pressure today in the office is 134/84.    She has dyslipidemia which is not currently treated with medication.  Last lipid check was in June 2019 which showed elevation of both total cholesterol and LDL. She has been trying to make some lifestyle changes--mostly diet-related. Is cutting back on carbohydrates and soda intake. Eating more salads. Has lost >10 lbs since last visit. Current BMI is 39.69.    She has mixed COPD and asthma not currently treated with any maintenance inhalers due to cost, but previously used Symbicort. Endorses PFTs in remote past--no records available for review. She states that she doesn't feel she has problem getting air in--just getting the air out.  She does have prescription for albuterol inhaler but has not been able to fill this either due to cost. Carries her nebulizer machine with her which she estimates that she uses fairly infrequently. She continues to smoke, currently going through about 1 pack per week. She does not have interest in  "cessation medications or tobacco cessation program. States has tried it all in the past. Feels she should be allowed to have one vice.    Was found to have leukocytosis on last set of lab work done in 6/2019.  I put in repeat lab orders for her to have done which have not yet been completed.      Current medicines (including changes today)  Current Outpatient Medications   Medication Sig Dispense Refill   • Mometasone Furo-Formoterol Fum (DULERA) 100-5 MCG/ACT Aerosol Inhale 2 Inhalation by mouth 2 Times a Day. Sample Use. 2 Inhaler 0   • diclofenac EC (VOLTAREN) 75 MG Tablet Delayed Response TAKE 1 TABLET BY MOUTH TWICE DAILY 180 Tab 0   • amLODIPine (NORVASC) 10 MG Tab Take 1 Tab by mouth every day. 90 Tab 0   • telmisartan-hydrochlorothiazide (MICARDIS HCT) 80-12.5 MG per tablet TAKE 1 TABLET BY MOUTH ONCE DAILY 90 Tab 1   • metoprolol SR (TOPROL XL) 25 MG TABLET SR 24 HR TAKE 1 TABLET BY MOUTH ONCE DAILY 90 Tab 1   • albuterol 108 (90 Base) MCG/ACT Aero Soln inhalation aerosol Inhale 2 Puffs by mouth every 6 hours as needed for Shortness of Breath. 8.5 g 1   • aspirin EC (ECOTRIN) 81 MG Tablet Delayed Response Take 81 mg by mouth every day.     • Multiple Vitamin (DAILY VITAMIN PO) Take 1 Tab by mouth every day.       No current facility-administered medications for this visit.      She  has a past medical history of ASTHMA, Hypertension, and Physiological ovarian cysts. She also has no past medical history of Allergy.    ROS  As per HPI.       Objective:     /84 (BP Location: Right arm, Patient Position: Sitting, BP Cuff Size: Adult)   Pulse 64   Temp 36.9 °C (98.4 °F) (Tympanic)   Ht 1.575 m (5' 2\")   Wt 98.4 kg (217 lb)   SpO2 96%  Body mass index is 39.69 kg/m².     Physical Exam:  Constitutional: Alert, no distress.  Skin: No rashes in visible areas.  Eye: Pupils are equal and round, conjunctiva clear, lids normal.  ENMT: Lips without lesions, moist mucus membranes.  Neck: " Normal-appearing.  Respiratory: Unlabored respiratory effort, lungs clear to auscultation, no wheezes, no rhonchi.  Cardiovascular: Normal S1, S2, no murmur.      Assessment and Plan:   The following treatment plan was discussed    1. Primary osteoarthritis of both knees  Established problem, remains uncontrolled.  We discussed that if she is truly not noticing any improvement on the diclofenac, she should stop taking it.  The safest medication for long-term use would be Tylenol.  She does not feel that that has been helpful either.  Follow-up with orthopedics as needed.    2. Essential hypertension  Established problem, well-controlled on current medication regimen.  Blood pressure at goal.  Continue current management.    3. Pure hypercholesterolemia  Established problem, uncontrolled but suspect improved given the lifestyle changes she has made and the weight loss she has had.  Will repeat lipids.    4. Obesity (BMI 30-39.9)  Patient congratulated on her weight loss and urged to continue.  We discussed that any weight loss would have a great benefit for her knee pain as well as her overall health.  - Patient identified as having weight management issue.  Appropriate orders and counseling given.    5. COPD with asthma (HCC)  Established problem, stable.  Continue to recommend maintenance inhaler as it would benefit her breathing.  I do see that Dulera is in stock at the [facility] sample pharmacy.  She is willing to go monthly to  refills so I have ordered.  Continue PRN albuterol.  - Mometasone Furo-Formoterol Fum (DULERA) 100-5 MCG/ACT Aerosol; Inhale 2 Inhalation by mouth 2 Times a Day. Sample Use.  Dispense: 2 Inhaler; Refill: 0    6. Nicotine dependence, cigarettes, uncomplicated  Established problem, uncontrolled.  Continues to smoke with no interest in any medication/outside help.  She plans to continue cutting down gradually.    7. Leukocytosis, unspecified type  Established problem. Has had  elevated WBC on two sets of lab work from last year. Need to follow to ensure resolution. If not, will need further hematology work-up. Patient reminded to have repeat CBC/other lab work done. Orders re-printed.    8. Encounter for screening for malignant neoplasm of breast  - MA-SCREENING MAMMO BILAT W/CAD; Future      Had discussion with patient regarding health maintenance items. She has never had screening mammogram so I have ordered. She declines any type of colon cancer screening including colonoscopy or FIT.       Followup: Return in about 6 months (around 8/26/2020).    Xochilt Ricardo P.A.-C.

## 2020-03-12 ENCOUNTER — TELEPHONE (OUTPATIENT)
Dept: MEDICAL GROUP | Facility: PHYSICIAN GROUP | Age: 55
End: 2020-03-12

## 2020-03-12 NOTE — TELEPHONE ENCOUNTER
1. Caller Name: Sharmin                          Call Back Number: [phone]        How would the patient prefer to be contacted with a response: Phone call OK to leave a detailed message    2. SPECIFIC Action To Be Taken: handicap placard     3. Diagnosis/Clinical Reason for Request: Bilateral Knee Pain        Pt called requesting a update on her handicap placard. I don't see that one was done at last visit, but the bilateral knee pain was discussed. Can the placard paperwork be given or does pt need appt? Please advise.

## 2020-03-12 NOTE — TELEPHONE ENCOUNTER
Informed patient she needs to come in and fill out her part of the form so we can make a copy. She showed understanding

## 2020-03-26 DIAGNOSIS — M25.561 BILATERAL CHRONIC KNEE PAIN: ICD-10-CM

## 2020-03-26 DIAGNOSIS — I10 ESSENTIAL HYPERTENSION: ICD-10-CM

## 2020-03-26 DIAGNOSIS — M17.0 OSTEOARTHRITIS OF BOTH KNEES, UNSPECIFIED OSTEOARTHRITIS TYPE: ICD-10-CM

## 2020-03-26 DIAGNOSIS — M25.562 BILATERAL CHRONIC KNEE PAIN: ICD-10-CM

## 2020-03-26 DIAGNOSIS — G89.29 BILATERAL CHRONIC KNEE PAIN: ICD-10-CM

## 2020-03-26 NOTE — TELEPHONE ENCOUNTER
Was the patient seen in the last year in this department? Yes    Does patient have an active prescription for medications requested? No     Received Request Via: Pharmacy      Pt met protocol?: Yes, OV last month   BP Readings from Last 1 Encounters:   02/26/20 134/84

## 2020-03-27 RX ORDER — AMLODIPINE BESYLATE 10 MG/1
TABLET ORAL
Qty: 90 TAB | Refills: 0 | Status: SHIPPED | OUTPATIENT
Start: 2020-03-27 | End: 2024-03-15

## 2020-03-27 RX ORDER — METOPROLOL SUCCINATE 25 MG/1
TABLET, EXTENDED RELEASE ORAL
Qty: 90 TAB | Refills: 0 | Status: SHIPPED | OUTPATIENT
Start: 2020-03-27 | End: 2024-03-15

## 2020-03-27 RX ORDER — DICLOFENAC SODIUM 75 MG/1
TABLET, DELAYED RELEASE ORAL
Qty: 180 TAB | Refills: 0 | Status: SHIPPED | OUTPATIENT
Start: 2020-03-27 | End: 2022-10-19

## 2020-03-27 RX ORDER — TELMISARTAN AND HYDROCHLORTHIAZIDE 80; 12.5 MG/1; MG/1
TABLET ORAL
Qty: 90 TAB | Refills: 0 | Status: SHIPPED | OUTPATIENT
Start: 2020-03-27 | End: 2024-03-15

## 2020-06-01 ENCOUNTER — HOSPITAL ENCOUNTER (OUTPATIENT)
Dept: RADIOLOGY | Facility: MEDICAL CENTER | Age: 55
End: 2020-06-01
Attending: PHYSICIAN ASSISTANT
Payer: COMMERCIAL

## 2020-06-01 ENCOUNTER — HOSPITAL ENCOUNTER (OUTPATIENT)
Dept: LAB | Facility: MEDICAL CENTER | Age: 55
End: 2020-06-01
Attending: PHYSICIAN ASSISTANT
Payer: COMMERCIAL

## 2020-06-01 DIAGNOSIS — M25.562 LEFT KNEE PAIN, UNSPECIFIED CHRONICITY: ICD-10-CM

## 2020-06-01 LAB
ALBUMIN SERPL BCP-MCNC: 4.6 G/DL (ref 3.2–4.9)
ALBUMIN/GLOB SERPL: 1.8 G/DL
ALP SERPL-CCNC: 83 U/L (ref 30–99)
ALT SERPL-CCNC: 27 U/L (ref 2–50)
ANION GAP SERPL CALC-SCNC: 12 MMOL/L (ref 7–16)
AST SERPL-CCNC: 15 U/L (ref 12–45)
BASOPHILS # BLD AUTO: 0.5 % (ref 0–1.8)
BASOPHILS # BLD: 0.04 K/UL (ref 0–0.12)
BILIRUB SERPL-MCNC: 0.3 MG/DL (ref 0.1–1.5)
BUN SERPL-MCNC: 21 MG/DL (ref 8–22)
CALCIUM SERPL-MCNC: 9.7 MG/DL (ref 8.5–10.5)
CHLORIDE SERPL-SCNC: 103 MMOL/L (ref 96–112)
CHOLEST SERPL-MCNC: 216 MG/DL (ref 100–199)
CO2 SERPL-SCNC: 26 MMOL/L (ref 20–33)
CREAT SERPL-MCNC: 0.68 MG/DL (ref 0.5–1.4)
EOSINOPHIL # BLD AUTO: 0.11 K/UL (ref 0–0.51)
EOSINOPHIL NFR BLD: 1.4 % (ref 0–6.9)
ERYTHROCYTE [DISTWIDTH] IN BLOOD BY AUTOMATED COUNT: 47.8 FL (ref 35.9–50)
FASTING STATUS PATIENT QL REPORTED: NORMAL
GLOBULIN SER CALC-MCNC: 2.5 G/DL (ref 1.9–3.5)
GLUCOSE SERPL-MCNC: 106 MG/DL (ref 65–99)
HCT VFR BLD AUTO: 52.2 % (ref 37–47)
HDLC SERPL-MCNC: 60 MG/DL
HGB BLD-MCNC: 17.1 G/DL (ref 12–16)
HIV 1+2 AB+HIV1 P24 AG SERPL QL IA: NORMAL
IMM GRANULOCYTES # BLD AUTO: 0.02 K/UL (ref 0–0.11)
IMM GRANULOCYTES NFR BLD AUTO: 0.3 % (ref 0–0.9)
LDLC SERPL CALC-MCNC: 138 MG/DL
LYMPHOCYTES # BLD AUTO: 3.2 K/UL (ref 1–4.8)
LYMPHOCYTES NFR BLD: 41.2 % (ref 22–41)
MCH RBC QN AUTO: 32.2 PG (ref 27–33)
MCHC RBC AUTO-ENTMCNC: 32.8 G/DL (ref 33.6–35)
MCV RBC AUTO: 98.3 FL (ref 81.4–97.8)
MONOCYTES # BLD AUTO: 0.4 K/UL (ref 0–0.85)
MONOCYTES NFR BLD AUTO: 5.1 % (ref 0–13.4)
NEUTROPHILS # BLD AUTO: 4 K/UL (ref 2–7.15)
NEUTROPHILS NFR BLD: 51.5 % (ref 44–72)
NRBC # BLD AUTO: 0 K/UL
NRBC BLD-RTO: 0 /100 WBC
PLATELET # BLD AUTO: 228 K/UL (ref 164–446)
PMV BLD AUTO: 10.3 FL (ref 9–12.9)
POTASSIUM SERPL-SCNC: 4.6 MMOL/L (ref 3.6–5.5)
PROT SERPL-MCNC: 7.1 G/DL (ref 6–8.2)
RBC # BLD AUTO: 5.31 M/UL (ref 4.2–5.4)
SODIUM SERPL-SCNC: 141 MMOL/L (ref 135–145)
TRIGL SERPL-MCNC: 90 MG/DL (ref 0–149)
TSH SERPL DL<=0.005 MIU/L-ACNC: 1.9 UIU/ML (ref 0.38–5.33)
WBC # BLD AUTO: 7.8 K/UL (ref 4.8–10.8)

## 2020-06-01 PROCEDURE — 84443 ASSAY THYROID STIM HORMONE: CPT

## 2020-06-01 PROCEDURE — 80053 COMPREHEN METABOLIC PANEL: CPT

## 2020-06-01 PROCEDURE — 36415 COLL VENOUS BLD VENIPUNCTURE: CPT

## 2020-06-01 PROCEDURE — 73562 X-RAY EXAM OF KNEE 3: CPT | Mod: LT

## 2020-06-01 PROCEDURE — 85025 COMPLETE CBC W/AUTO DIFF WBC: CPT

## 2020-06-01 PROCEDURE — 80061 LIPID PANEL: CPT

## 2020-06-01 PROCEDURE — 87389 HIV-1 AG W/HIV-1&-2 AB AG IA: CPT

## 2020-06-16 ENCOUNTER — HOSPITAL ENCOUNTER (EMERGENCY)
Facility: MEDICAL CENTER | Age: 55
End: 2020-06-16
Attending: EMERGENCY MEDICINE
Payer: COMMERCIAL

## 2020-06-16 ENCOUNTER — OFFICE VISIT (OUTPATIENT)
Dept: URGENT CARE | Facility: PHYSICIAN GROUP | Age: 55
End: 2020-06-16
Payer: COMMERCIAL

## 2020-06-16 ENCOUNTER — APPOINTMENT (OUTPATIENT)
Dept: RADIOLOGY | Facility: MEDICAL CENTER | Age: 55
End: 2020-06-16
Attending: EMERGENCY MEDICINE
Payer: COMMERCIAL

## 2020-06-16 ENCOUNTER — TELEPHONE (OUTPATIENT)
Dept: HEALTH INFORMATION MANAGEMENT | Facility: OTHER | Age: 55
End: 2020-06-16

## 2020-06-16 VITALS
WEIGHT: 223.77 LBS | SYSTOLIC BLOOD PRESSURE: 121 MMHG | RESPIRATION RATE: 17 BRPM | BODY MASS INDEX: 38.2 KG/M2 | HEIGHT: 64 IN | OXYGEN SATURATION: 92 % | HEART RATE: 64 BPM | TEMPERATURE: 97.3 F | DIASTOLIC BLOOD PRESSURE: 60 MMHG

## 2020-06-16 VITALS
WEIGHT: 215 LBS | RESPIRATION RATE: 16 BRPM | HEART RATE: 72 BPM | DIASTOLIC BLOOD PRESSURE: 86 MMHG | OXYGEN SATURATION: 97 % | SYSTOLIC BLOOD PRESSURE: 124 MMHG | TEMPERATURE: 97.4 F | HEIGHT: 64 IN | BODY MASS INDEX: 36.7 KG/M2

## 2020-06-16 DIAGNOSIS — R10.84 GENERALIZED ABDOMINAL PAIN: ICD-10-CM

## 2020-06-16 DIAGNOSIS — J44.89 COPD WITH ASTHMA (HCC): ICD-10-CM

## 2020-06-16 DIAGNOSIS — R11.0 NAUSEA: ICD-10-CM

## 2020-06-16 DIAGNOSIS — R10.12 LEFT UPPER QUADRANT ABDOMINAL PAIN: ICD-10-CM

## 2020-06-16 DIAGNOSIS — R53.83 FATIGUE, UNSPECIFIED TYPE: ICD-10-CM

## 2020-06-16 DIAGNOSIS — Z20.822 SUSPECTED COVID-19 VIRUS INFECTION: ICD-10-CM

## 2020-06-16 DIAGNOSIS — R10.12 LUQ ABDOMINAL PAIN: ICD-10-CM

## 2020-06-16 DIAGNOSIS — F17.210 NICOTINE DEPENDENCE, CIGARETTES, UNCOMPLICATED: ICD-10-CM

## 2020-06-16 LAB
ALBUMIN SERPL BCP-MCNC: 3.6 G/DL (ref 3.2–4.9)
ALBUMIN/GLOB SERPL: 1.4 G/DL
ALP SERPL-CCNC: 69 U/L (ref 30–99)
ALT SERPL-CCNC: 24 U/L (ref 2–50)
ANION GAP SERPL CALC-SCNC: 9 MMOL/L (ref 7–16)
APPEARANCE UR: CLEAR
APPEARANCE UR: CLEAR
AST SERPL-CCNC: 16 U/L (ref 12–45)
BASOPHILS # BLD AUTO: 0.3 % (ref 0–1.8)
BASOPHILS # BLD: 0.03 K/UL (ref 0–0.12)
BILIRUB SERPL-MCNC: 0.4 MG/DL (ref 0.1–1.5)
BILIRUB UR QL STRIP.AUTO: NEGATIVE
BILIRUB UR STRIP-MCNC: NORMAL MG/DL
BUN SERPL-MCNC: 15 MG/DL (ref 8–22)
CALCIUM SERPL-MCNC: 8.9 MG/DL (ref 8.5–10.5)
CHLORIDE SERPL-SCNC: 97 MMOL/L (ref 96–112)
CO2 SERPL-SCNC: 25 MMOL/L (ref 20–33)
COLOR UR AUTO: NORMAL
COLOR UR: YELLOW
COVID ORDER STATUS COVID19: NORMAL
CREAT SERPL-MCNC: 0.56 MG/DL (ref 0.5–1.4)
CRP SERPL HS-MCNC: 0.07 MG/DL (ref 0–0.75)
EOSINOPHIL # BLD AUTO: 0.03 K/UL (ref 0–0.51)
EOSINOPHIL NFR BLD: 0.3 % (ref 0–6.9)
ERYTHROCYTE [DISTWIDTH] IN BLOOD BY AUTOMATED COUNT: 46.5 FL (ref 35.9–50)
ERYTHROCYTE [SEDIMENTATION RATE] IN BLOOD BY WESTERGREN METHOD: <1 MM/HOUR (ref 0–30)
GLOBULIN SER CALC-MCNC: 2.5 G/DL (ref 1.9–3.5)
GLUCOSE SERPL-MCNC: 81 MG/DL (ref 65–99)
GLUCOSE UR STRIP.AUTO-MCNC: NEGATIVE MG/DL
GLUCOSE UR STRIP.AUTO-MCNC: NORMAL MG/DL
HCT VFR BLD AUTO: 51 % (ref 37–47)
HGB BLD-MCNC: 17.4 G/DL (ref 12–16)
IMM GRANULOCYTES # BLD AUTO: 0.03 K/UL (ref 0–0.11)
IMM GRANULOCYTES NFR BLD AUTO: 0.3 % (ref 0–0.9)
KETONES UR STRIP.AUTO-MCNC: NEGATIVE MG/DL
KETONES UR STRIP.AUTO-MCNC: NORMAL MG/DL
LACTATE BLD-SCNC: 2 MMOL/L (ref 0.5–2)
LEUKOCYTE ESTERASE UR QL STRIP.AUTO: NEGATIVE
LEUKOCYTE ESTERASE UR QL STRIP.AUTO: NORMAL
LIPASE SERPL-CCNC: 18 U/L (ref 11–82)
LYMPHOCYTES # BLD AUTO: 3.27 K/UL (ref 1–4.8)
LYMPHOCYTES NFR BLD: 31.7 % (ref 22–41)
MCH RBC QN AUTO: 32.6 PG (ref 27–33)
MCHC RBC AUTO-ENTMCNC: 34.1 G/DL (ref 33.6–35)
MCV RBC AUTO: 95.7 FL (ref 81.4–97.8)
MICRO URNS: NORMAL
MONOCYTES # BLD AUTO: 0.38 K/UL (ref 0–0.85)
MONOCYTES NFR BLD AUTO: 3.7 % (ref 0–13.4)
NEUTROPHILS # BLD AUTO: 6.59 K/UL (ref 2–7.15)
NEUTROPHILS NFR BLD: 63.7 % (ref 44–72)
NITRITE UR QL STRIP.AUTO: NEGATIVE
NITRITE UR QL STRIP.AUTO: NORMAL
NRBC # BLD AUTO: 0 K/UL
NRBC BLD-RTO: 0 /100 WBC
PH UR STRIP.AUTO: 5.5 [PH] (ref 5–8)
PH UR STRIP.AUTO: 5.5 [PH] (ref 5–8)
PLATELET # BLD AUTO: 251 K/UL (ref 164–446)
PMV BLD AUTO: 10.1 FL (ref 9–12.9)
POTASSIUM SERPL-SCNC: 3.7 MMOL/L (ref 3.6–5.5)
PROT SERPL-MCNC: 6.1 G/DL (ref 6–8.2)
PROT UR QL STRIP: NEGATIVE MG/DL
PROT UR QL STRIP: NORMAL MG/DL
RBC # BLD AUTO: 5.33 M/UL (ref 4.2–5.4)
RBC UR QL AUTO: NEGATIVE
RBC UR QL AUTO: NORMAL
SODIUM SERPL-SCNC: 131 MMOL/L (ref 135–145)
SP GR UR STRIP.AUTO: 1.01
SP GR UR STRIP.AUTO: 1.01
UROBILINOGEN UR STRIP-MCNC: 0.2 MG/DL
UROBILINOGEN UR STRIP.AUTO-MCNC: 0.2 MG/DL
WBC # BLD AUTO: 10.3 K/UL (ref 4.8–10.8)

## 2020-06-16 PROCEDURE — C9803 HOPD COVID-19 SPEC COLLECT: HCPCS | Performed by: EMERGENCY MEDICINE

## 2020-06-16 PROCEDURE — U0003 INFECTIOUS AGENT DETECTION BY NUCLEIC ACID (DNA OR RNA); SEVERE ACUTE RESPIRATORY SYNDROME CORONAVIRUS 2 (SARS-COV-2) (CORONAVIRUS DISEASE [COVID-19]), AMPLIFIED PROBE TECHNIQUE, MAKING USE OF HIGH THROUGHPUT TECHNOLOGIES AS DESCRIBED BY CMS-2020-01-R: HCPCS

## 2020-06-16 PROCEDURE — 96375 TX/PRO/DX INJ NEW DRUG ADDON: CPT

## 2020-06-16 PROCEDURE — 74177 CT ABD & PELVIS W/CONTRAST: CPT

## 2020-06-16 PROCEDURE — 700111 HCHG RX REV CODE 636 W/ 250 OVERRIDE (IP): Performed by: EMERGENCY MEDICINE

## 2020-06-16 PROCEDURE — 99285 EMERGENCY DEPT VISIT HI MDM: CPT

## 2020-06-16 PROCEDURE — 83690 ASSAY OF LIPASE: CPT

## 2020-06-16 PROCEDURE — 700102 HCHG RX REV CODE 250 W/ 637 OVERRIDE(OP): Performed by: EMERGENCY MEDICINE

## 2020-06-16 PROCEDURE — 86140 C-REACTIVE PROTEIN: CPT

## 2020-06-16 PROCEDURE — 700105 HCHG RX REV CODE 258: Performed by: EMERGENCY MEDICINE

## 2020-06-16 PROCEDURE — 83605 ASSAY OF LACTIC ACID: CPT

## 2020-06-16 PROCEDURE — 85652 RBC SED RATE AUTOMATED: CPT

## 2020-06-16 PROCEDURE — 81003 URINALYSIS AUTO W/O SCOPE: CPT

## 2020-06-16 PROCEDURE — 85025 COMPLETE CBC W/AUTO DIFF WBC: CPT

## 2020-06-16 PROCEDURE — 81002 URINALYSIS NONAUTO W/O SCOPE: CPT | Performed by: PHYSICIAN ASSISTANT

## 2020-06-16 PROCEDURE — A9270 NON-COVERED ITEM OR SERVICE: HCPCS | Performed by: EMERGENCY MEDICINE

## 2020-06-16 PROCEDURE — 96374 THER/PROPH/DIAG INJ IV PUSH: CPT

## 2020-06-16 PROCEDURE — 700117 HCHG RX CONTRAST REV CODE 255: Performed by: EMERGENCY MEDICINE

## 2020-06-16 PROCEDURE — 96376 TX/PRO/DX INJ SAME DRUG ADON: CPT

## 2020-06-16 PROCEDURE — 99214 OFFICE O/P EST MOD 30 MIN: CPT | Performed by: PHYSICIAN ASSISTANT

## 2020-06-16 PROCEDURE — 80053 COMPREHEN METABOLIC PANEL: CPT

## 2020-06-16 RX ORDER — ONDANSETRON 2 MG/ML
4 INJECTION INTRAMUSCULAR; INTRAVENOUS ONCE
Status: COMPLETED | OUTPATIENT
Start: 2020-06-16 | End: 2020-06-16

## 2020-06-16 RX ORDER — MORPHINE SULFATE 4 MG/ML
4 INJECTION, SOLUTION INTRAMUSCULAR; INTRAVENOUS ONCE
Status: COMPLETED | OUTPATIENT
Start: 2020-06-16 | End: 2020-06-16

## 2020-06-16 RX ORDER — ONDANSETRON 4 MG/1
4 TABLET, ORALLY DISINTEGRATING ORAL EVERY 8 HOURS PRN
Qty: 10 TAB | Refills: 0 | Status: SHIPPED | OUTPATIENT
Start: 2020-06-16 | End: 2022-04-25

## 2020-06-16 RX ORDER — SODIUM CHLORIDE, SODIUM LACTATE, POTASSIUM CHLORIDE, CALCIUM CHLORIDE 600; 310; 30; 20 MG/100ML; MG/100ML; MG/100ML; MG/100ML
1000 INJECTION, SOLUTION INTRAVENOUS ONCE
Status: COMPLETED | OUTPATIENT
Start: 2020-06-16 | End: 2020-06-16

## 2020-06-16 RX ADMIN — SODIUM CHLORIDE, POTASSIUM CHLORIDE, SODIUM LACTATE AND CALCIUM CHLORIDE 1000 ML: 600; 310; 30; 20 INJECTION, SOLUTION INTRAVENOUS at 11:25

## 2020-06-16 RX ADMIN — MORPHINE SULFATE 4 MG: 4 INJECTION INTRAVENOUS at 11:25

## 2020-06-16 RX ADMIN — IOHEXOL 100 ML: 350 INJECTION, SOLUTION INTRAVENOUS at 11:48

## 2020-06-16 RX ADMIN — ONDANSETRON 4 MG: 2 INJECTION INTRAMUSCULAR; INTRAVENOUS at 14:20

## 2020-06-16 RX ADMIN — ONDANSETRON 4 MG: 2 INJECTION INTRAMUSCULAR; INTRAVENOUS at 11:25

## 2020-06-16 RX ADMIN — MORPHINE SULFATE 4 MG: 4 INJECTION INTRAVENOUS at 14:20

## 2020-06-16 RX ADMIN — LIDOCAINE HYDROCHLORIDE 30 ML: 20 SOLUTION OROPHARYNGEAL at 14:20

## 2020-06-16 ASSESSMENT — ENCOUNTER SYMPTOMS
MYALGIAS: 1
BLOOD IN STOOL: 0
FEVER: 0
SHORTNESS OF BREATH: 1
COUGH: 0
ABDOMINAL PAIN: 1
DIARRHEA: 0
VOMITING: 0
CHILLS: 1
SPUTUM PRODUCTION: 0
NAUSEA: 1
SORE THROAT: 0
FATIGUE: 1

## 2020-06-16 ASSESSMENT — FIBROSIS 4 INDEX
FIB4 SCORE: 0.66
FIB4 SCORE: 0.66

## 2020-06-16 NOTE — ED PROVIDER NOTES
ED Provider Note    CHIEF COMPLAINT  Chief Complaint   Patient presents with   • Abdominal Pain   • Nausea       HPI  Sharmin Cardozo is a 54 y.o. female w she of hypertension, asthma, ovarian cysts, status post cholecystectomy, appendectomy, hysterectomy ho presents with complaints of mid abdominal left upper quadrant abdominal pain for the past 2 days.  Patient says the pain is been constant since yesterday.  She has had nausea, but denies any vomiting.  She has had normal bowel movements without diarrhea.  The patient went to the urgent care, and was referred to the emergency department.  The patient rates her pain is severe at this time.  She notes it seems be worse when she tries to eat or drink.  She has had no fever, shaking chills, sore throat, cough, congestion, or difficulty breathing.  She has had no urinary symptoms.  Does note that she suffers from fairly severe heartburn, and cannot eat spicy foods, barbecue sauce, or tomato sauce.  She has never     REVIEW OF SYSTEMS  See HPI for further details. All other systems are negative.     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)   • Drug use: No      Social History     Substance and Sexual Activity   Drug Use No       SURGICAL HISTORY  Past Surgical History:   Procedure Laterality Date   • CYSTOSCOPY  3/24/2009    Performed by ZORAN BRIGGS at SURGERY SAME DAY [facility]   •  "VAGINAL HYSTERECTOMY SCOPE TOTAL  3/24/2009    Performed by ZORAN BRIGGS at SURGERY SAME DAY [facility]   • COMPL.ULNAR NERVE TRANSPOSITION W/ POSS AUTOGRAFT  2003   • MASS EXCISION GENERAL Left 2003    left forearm - benign per patient   • CHOLECYSTECTOMY     • CHOLECYSTECTOMY     • PB VAG DELIV ONLY,PBEV C-SECTN      x 4   • TUBAL LIGATION         CURRENT MEDICATIONS  Home Medications    **Home medications have not yet been reviewed for this encounter**         ALLERGIES  Allergies   Allergen Reactions   • Bactrim Rash     Rxn - about 2016     • Iodine Nausea   • Tetanus Toxoid Rash and Swelling     Rash & swelling from tetanus vaccine        PHYSICAL EXAM0  VITAL SIGNS: Blood Pressure 121/60   Pulse 64   Temperature 36.3 °C (97.3 °F) (Temporal)   Respiration 17   Height 1.626 m (5' 4\")   Weight 101.5 kg (223 lb 12.3 oz)   Last Menstrual Period 04/17/2008   Oxygen Saturation 92%   Body Mass Index 38.41 kg/m²   Constitutional: Awake, alert, in no acute distress, Non-toxic appearance.  Appears mildly uncomfortable holding her left upper abdomen  HENT: Atraumatic. Bilateral external ears normal, mucous membranes are dry, throat nonerythematous without exudates, nose is normal.  Eyes: PERRL, EOMI, conjunctiva moist, noninjected.  Neck: Nontender, Normal range of motion, No nuchal rigidity, No stridor.   Lymphatic: No lymphadenopathy noted.   Cardiovascular: Regular rate and rhythm, no murmurs, rubs, gallops.  Thorax & Lungs:  Good breath sounds bilaterally, no wheezes, rales, or retractions.  No chest tenderness.  Abdomen: Bowel sounds normal, Soft, nondistended, there is tenderness to the epigastrium, with increased tenderness to the left upper quadrant, no tenderness in the right upper quadrant or to the lower abdomen, no focal tenderness at McBurney's point, no rebound, guarding, masses.  Back: No CVA or spinal tenderness.  Extremities: Intact distal pulses, No edema, No tenderness.   Skin: Warm, " Dry, No rashes.   Musculoskeletal: No joint swelling or tenderness.  Neurologic: Alert & oriented x 3, sensory and motor function normal. No focal deficits.   Psychiatric: Affect normal, Judgment normal, Mood normal.       LABS  Labs Reviewed   CBC WITH DIFFERENTIAL - Abnormal; Notable for the following components:       Result Value    Hemoglobin 17.4 (*)     Hematocrit 51.0 (*)     All other components within normal limits   COMP METABOLIC PANEL - Abnormal; Notable for the following components:    Sodium 131 (*)     All other components within normal limits    Narrative:     RECOLLECT-hemolyzed,notified jimenez [id number],rn   LIPASE   LACTIC ACID   URINALYSIS,CULTURE IF INDICATED   SED RATE   CRP QUANTITIVE (NON-CARDIAC)   COVID/SARS COV-2    Narrative:     Send out  Expected Turn around time?->Routine ([facility] up to 4 days)   ESTIMATED GFR    Narrative:     RECOLLECT-hemolyzed,notified jimenez [id number],rn   SARS-COV-2, PCR (IN-HOUSE)    Narrative:     Send out  Expected Turn around time?->Routine ([facility] up to 4 days)       All labs reviewed by me.      RADIOLOGY/PROCEDURES  CT-ABDOMEN-PELVIS WITH   Final Result      1. Hepatic steatosis.      2. Previously identified 5 mm right lower lobe pulmonary nodule has decreased in size to 2 to 3 mm and does not require additional follow-up.      3. 1.1 cm cyst superiorly in left lobe of liver unchanged from previous exam.      4. Previous cholecystectomy.          The radiologist's interpretations of all radiological studies have been reviewed by me.        COURSE & MEDICAL DECISION MAKING  Pertinent Labs & Imaging studies reviewed. (See chart for details)  Patient presents with above complaints.  Clinically she appears dehydrated with dry mucous membranes, and decreased oral intake.  IV was placed, she was given a liter bolus of lactated Ringer's, morphine, and Zofran.    HYDRATION: Based on the patient's presentation of Dehydration and Inability to take oral fluids the  patient was given IV fluids. IV Hydration was used because oral hydration was not adequate alone. Upon recheck following hydration, the patient was feeling improved.      CBC shows white count 10,300, hemoglobin 17.4, normal differential, shows a sodium 131, otherwise normal, lipase normal, sedimentation rate less than 1, C-reactive protein 0.07, urinalysis negative.  A send out COVID test was obtained which had been ordered by the urgent care.  CT scan of the abdomen/pelvis IV contrast was obtained which no acute intra-abdominal pathology, no acute inflammatory changes.  White count is normal as is her inflammatory markers.  I suspect the patient has early peptic ulcer disease or gastritis.  She complained of having recurrent pain, was given additional dose of morphine and Zofran, and a GI cocktail which immediately resolved her pain.  Patient will be discharged with instruction to follow-up with her PCP.  She is also referred to gastroenterology on call for possible endoscopy.  Patient will be started on Prilosec OTC and zofran.  Patient is to return to the ER for worsening pain, fever, vomiting, bloody stools or black stools  or any other problems.        FINAL IMPRESSION  1. LUQ abdominal pain    2. Nausea          Electronically signed by: Rudy Hendricks M.D., 6/16/2020 11:42 AM

## 2020-06-16 NOTE — ED TRIAGE NOTES
Pts masked pta.  Sent by  for further eval.  Pt c/o LUQ pain & nausea x 2 days.    Pt denies respiratory symptoms/fever.  Pt denies exposure to anyone known to be Covid +.

## 2020-06-16 NOTE — ED NOTES
Pt discharged home as ordered by erp. Pt instructed to follow up with their PCP and return here as need. Pt  instructed no driving/drinking on pain medications. Pt verbalized understanding and left ambulating independently. Pt will be walking to her husbands place of work.

## 2020-06-16 NOTE — PATIENT INSTRUCTIONS

## 2020-06-16 NOTE — PROGRESS NOTES
Subjective:   Sharmin Cardozo  is a 54 y.o. female who presents for Fatigue (Stomach pain, bodyaches, urine has strong odor x 2 days)    This is a new problem.  Patient presents to urgent care with 3-day history of extreme fatigue, generalized body aches, generalized abdominal pain worse in the epigastric and left upper quadrant with chills and shaking chills at time.  Patient is unsure if she has had fever.  She states that she drank a red bull a few days ago which caused her urine to have an unusual odor which has persisted.  She denies any dysuria, urgency or frequency with urination.  Patient is status post cholecystectomy.    Patient does have a history of asthma and reports that she has felt short of breath however she states that this is unchanged from her baseline.  Patient does still continue to smoke.    Patient has had no known exposure to COVID-19 or suspected case of COVID-19.  However, she does work at [employer] and is exposed to a lot of people.      Fatigue   Associated symptoms include abdominal pain, chills, fatigue, myalgias and nausea. Pertinent negatives include no chest pain, congestion, coughing, fever, sore throat or vomiting.     Review of Systems   Constitutional: Positive for chills, fatigue and malaise/fatigue. Negative for fever.   HENT: Negative for congestion, ear pain and sore throat.    Respiratory: Positive for shortness of breath. Negative for cough and sputum production.    Cardiovascular: Negative for chest pain.   Gastrointestinal: Positive for abdominal pain and nausea. Negative for blood in stool, diarrhea, melena and vomiting.   Musculoskeletal: Positive for myalgias.   All other systems reviewed and are negative.    Allergies   Allergen Reactions   • Bactrim Rash     Rxn - about 2016     • Iodine Nausea   • Tetanus Toxoid Rash and Swelling     Rash & swelling from tetanus vaccine      Reviewed past medical, surgical , social and family history.  Reviewed prescription and  "over-the-counter medications with patient and electronic health record today.     Objective:   /86   Pulse 72   Temp 36.3 °C (97.4 °F)   Resp 16   Ht 1.626 m (5' 4\")   Wt 97.5 kg (215 lb)   LMP 04/17/2008   SpO2 97%   BMI 36.90 kg/m²   Physical Exam  Vitals signs reviewed.   Constitutional:       General: She is not in acute distress.     Appearance: She is well-developed. She is not ill-appearing or toxic-appearing.   HENT:      Head: Normocephalic and atraumatic.      Right Ear: Tympanic membrane, ear canal and external ear normal.      Left Ear: Tympanic membrane, ear canal and external ear normal.      Nose: Nose normal.      Mouth/Throat:      Lips: Pink. No lesions.      Mouth: Mucous membranes are moist.      Pharynx: Oropharynx is clear. Uvula midline. No oropharyngeal exudate.   Eyes:      General: Lids are normal.      Extraocular Movements: Extraocular movements intact.      Conjunctiva/sclera: Conjunctivae normal.      Pupils: Pupils are equal, round, and reactive to light.   Neck:      Musculoskeletal: Normal range of motion and neck supple.   Cardiovascular:      Rate and Rhythm: Normal rate and regular rhythm.      Heart sounds: Normal heart sounds. No murmur. No friction rub. No gallop.    Pulmonary:      Effort: Pulmonary effort is normal. No respiratory distress.      Breath sounds: Normal breath sounds.   Abdominal:      General: Bowel sounds are normal. There is no distension.      Palpations: Abdomen is soft. There is no mass.      Tenderness: There is generalized abdominal tenderness and tenderness in the left upper quadrant and left lower quadrant. There is no right CVA tenderness, left CVA tenderness, guarding or rebound. Negative signs include McBurney's sign.       Musculoskeletal: Normal range of motion.         General: No tenderness or deformity.   Lymphadenopathy:      Head:      Right side of head: No submental, submandibular or tonsillar adenopathy.      Left side of " head: No submental, submandibular or tonsillar adenopathy.      Cervical: No cervical adenopathy.      Upper Body:      Right upper body: No supraclavicular adenopathy.      Left upper body: No supraclavicular adenopathy.   Skin:     General: Skin is warm and dry.      Findings: No rash.   Neurological:      Mental Status: She is alert and oriented to person, place, and time.      Cranial Nerves: Cranial nerves are intact. No cranial nerve deficit.      Sensory: Sensation is intact. No sensory deficit.      Motor: Motor function is intact.      Coordination: Coordination is intact. Coordination normal.      Gait: Gait is intact.   Psychiatric:         Attention and Perception: Attention normal.         Mood and Affect: Mood and affect normal.         Speech: Speech normal.         Behavior: Behavior normal. Behavior is cooperative.         Thought Content: Thought content normal.         Judgment: Judgment normal.           Assessment/Plan:   1. Fatigue, unspecified type    2. Generalized abdominal pain  - POCT Urinalysis    3. Nausea  - POCT Urinalysis    4. COPD with asthma (HCC)    5. Nicotine dependence, cigarettes, uncomplicated    6. Left upper quadrant abdominal pain    7. Suspected COVID-19 virus infection    Results for orders placed or performed in visit on 06/16/20   POCT Urinalysis   Result Value Ref Range    POC Color Light Yellow Negative    POC Appearance Clear Negative    POC Leukocyte Esterase Neg Negative    POC Nitrites Neg Negative    POC Urobiligen 0.2 Negative (0.2) mg/dL    POC Protein Neg Negative mg/dL    POC Urine PH 5.5 5.0 - 8.0    POC Blood Neg Negative    POC Specific Gravity 1.015 <1.005 - >1.030    POC Ketones Neg Negative mg/dL    POC Bilirubin Neg Negative mg/dL    POC Glucose Neg Negative mg/dL       Urinalysis is within normal limits.  Given the patient's myriad of symptoms consideration must be given to the possibility of COVID-19.  However, I am most concerned about the  possibility of diverticulitis.  I did offer to initiate an outpatient work-up including labs and CT.  However, given the potential concern for COVID-19 she would not be able to have CT performed on an outpatient basis (this is identified through RN triage nurse with [facility]).  Therefore, the patient chooses to go to the emergency department for further evaluation and management.  Report is called to transfer center RN case manager Rachel.  Patient is counseled on the importance of wearing a mask.    Upon entering exam room I ensured patient was wearing a mask.  This provider wore a mask for the entire visit.  Patient wore mask entire visit except for a brief period while examining oropharynx.  Differential diagnosis, natural history, supportive care, and indications for immediate follow-up discussed.       The patient demonstrated a good understanding and agreed with the treatment plan.  Please note that this note was created using voice recognition speech to text software. Every effort has been made to correct obvious errors.  However, I expect there are errors of grammar and possibly context that were not discovered prior to finalizing the note  DIPAK Ma PA-C

## 2020-06-17 LAB
SARS-COV-2 RNA RESP QL NAA+PROBE: NOTDETECTED
SPECIMEN SOURCE: NORMAL

## 2020-06-19 NOTE — ED NOTES
COVID-19 Test Follow Up  06/19/20 6/16/2020 12:48   COVID Order Status Received   SARS-CoV-2 by PCR NotDetected   SARS-CoV-2 Source NP Swab       Patient tested negative for COVID-19. I have informed the patient of the result by MyChart. Encouraged to stay at home until no fever for 72 hours without the use of fever reducing medications and symptoms improving. Informed there is no need to further self-isolate for 14 days for COVID-19 unless otherwise directed by the Health Dept.     They are advised to return to the ER for worsening symptoms including difficulty breathing, ongoing fever, weakness or chest pain.    Prema Padilla, PharmD

## 2020-07-28 ENCOUNTER — APPOINTMENT (OUTPATIENT)
Dept: RADIOLOGY | Facility: MEDICAL CENTER | Age: 55
End: 2020-07-28
Attending: EMERGENCY MEDICINE
Payer: COMMERCIAL

## 2020-07-28 ENCOUNTER — HOSPITAL ENCOUNTER (EMERGENCY)
Facility: MEDICAL CENTER | Age: 55
End: 2020-07-28
Attending: EMERGENCY MEDICINE
Payer: COMMERCIAL

## 2020-07-28 VITALS
TEMPERATURE: 98.9 F | RESPIRATION RATE: 11 BRPM | SYSTOLIC BLOOD PRESSURE: 131 MMHG | HEART RATE: 46 BPM | HEIGHT: 62 IN | BODY MASS INDEX: 39.56 KG/M2 | WEIGHT: 215 LBS | DIASTOLIC BLOOD PRESSURE: 61 MMHG | OXYGEN SATURATION: 97 %

## 2020-07-28 DIAGNOSIS — F41.8 SITUATIONAL ANXIETY: ICD-10-CM

## 2020-07-28 DIAGNOSIS — R55 NEAR SYNCOPE: ICD-10-CM

## 2020-07-28 LAB
ALBUMIN SERPL BCP-MCNC: 4.1 G/DL (ref 3.2–4.9)
ALBUMIN/GLOB SERPL: 1.6 G/DL
ALP SERPL-CCNC: 74 U/L (ref 30–99)
ALT SERPL-CCNC: 25 U/L (ref 2–50)
ANION GAP SERPL CALC-SCNC: 12 MMOL/L (ref 7–16)
AST SERPL-CCNC: 11 U/L (ref 12–45)
BASOPHILS # BLD AUTO: 0.3 % (ref 0–1.8)
BASOPHILS # BLD: 0.02 K/UL (ref 0–0.12)
BILIRUB SERPL-MCNC: 0.4 MG/DL (ref 0.1–1.5)
BUN SERPL-MCNC: 19 MG/DL (ref 8–22)
CALCIUM SERPL-MCNC: 10 MG/DL (ref 8.5–10.5)
CHLORIDE SERPL-SCNC: 102 MMOL/L (ref 96–112)
CO2 SERPL-SCNC: 22 MMOL/L (ref 20–33)
CREAT SERPL-MCNC: 0.67 MG/DL (ref 0.5–1.4)
EKG IMPRESSION: NORMAL
EKG IMPRESSION: NORMAL
EOSINOPHIL # BLD AUTO: 0.07 K/UL (ref 0–0.51)
EOSINOPHIL NFR BLD: 0.9 % (ref 0–6.9)
ERYTHROCYTE [DISTWIDTH] IN BLOOD BY AUTOMATED COUNT: 46.9 FL (ref 35.9–50)
GLOBULIN SER CALC-MCNC: 2.6 G/DL (ref 1.9–3.5)
GLUCOSE SERPL-MCNC: 97 MG/DL (ref 65–99)
HCT VFR BLD AUTO: 47.4 % (ref 37–47)
HGB BLD-MCNC: 15.7 G/DL (ref 12–16)
IMM GRANULOCYTES # BLD AUTO: 0.02 K/UL (ref 0–0.11)
IMM GRANULOCYTES NFR BLD AUTO: 0.3 % (ref 0–0.9)
LYMPHOCYTES # BLD AUTO: 3.27 K/UL (ref 1–4.8)
LYMPHOCYTES NFR BLD: 42.5 % (ref 22–41)
MCH RBC QN AUTO: 32.1 PG (ref 27–33)
MCHC RBC AUTO-ENTMCNC: 33.1 G/DL (ref 33.6–35)
MCV RBC AUTO: 96.9 FL (ref 81.4–97.8)
MONOCYTES # BLD AUTO: 0.36 K/UL (ref 0–0.85)
MONOCYTES NFR BLD AUTO: 4.7 % (ref 0–13.4)
NEUTROPHILS # BLD AUTO: 3.95 K/UL (ref 2–7.15)
NEUTROPHILS NFR BLD: 51.3 % (ref 44–72)
NRBC # BLD AUTO: 0 K/UL
NRBC BLD-RTO: 0 /100 WBC
PLATELET # BLD AUTO: 218 K/UL (ref 164–446)
PMV BLD AUTO: 9.8 FL (ref 9–12.9)
POTASSIUM SERPL-SCNC: 4.2 MMOL/L (ref 3.6–5.5)
PROT SERPL-MCNC: 6.7 G/DL (ref 6–8.2)
RBC # BLD AUTO: 4.89 M/UL (ref 4.2–5.4)
SODIUM SERPL-SCNC: 136 MMOL/L (ref 135–145)
TROPONIN T SERPL-MCNC: <6 NG/L (ref 6–19)
TROPONIN T SERPL-MCNC: <6 NG/L (ref 6–19)
WBC # BLD AUTO: 7.7 K/UL (ref 4.8–10.8)

## 2020-07-28 PROCEDURE — 71045 X-RAY EXAM CHEST 1 VIEW: CPT

## 2020-07-28 PROCEDURE — 84484 ASSAY OF TROPONIN QUANT: CPT

## 2020-07-28 PROCEDURE — 85025 COMPLETE CBC W/AUTO DIFF WBC: CPT

## 2020-07-28 PROCEDURE — 80053 COMPREHEN METABOLIC PANEL: CPT

## 2020-07-28 PROCEDURE — 93005 ELECTROCARDIOGRAM TRACING: CPT

## 2020-07-28 PROCEDURE — 93005 ELECTROCARDIOGRAM TRACING: CPT | Performed by: EMERGENCY MEDICINE

## 2020-07-28 PROCEDURE — 99284 EMERGENCY DEPT VISIT MOD MDM: CPT

## 2020-07-28 PROCEDURE — 36415 COLL VENOUS BLD VENIPUNCTURE: CPT

## 2020-07-28 RX ORDER — OMEPRAZOLE 20 MG/1
20 CAPSULE, DELAYED RELEASE ORAL DAILY
Qty: 30 CAP | Refills: 0 | Status: SHIPPED | OUTPATIENT
Start: 2020-07-28 | End: 2020-08-27

## 2020-07-28 ASSESSMENT — FIBROSIS 4 INDEX: FIB4 SCORE: 0.68

## 2020-07-28 NOTE — ED PROVIDER NOTES
ED Provider Note    ER PROVIDER NOTE        CHIEF COMPLAINT  Chief Complaint   Patient presents with   • Syncope       HPI  Sharmin Cardozo is a 54 y.o. female who presents to the emergency department complaining of lightheadedness and potential syncope.  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  She did not actually fall or pass she does not think,, she states she feels somewhat shaky, and anxious now and was having some sharp pain towards her left chest although this has also improved.  No radiation to the pain her back or neck or arms or elsewhere.  Not pleuritic, not exertional, does not think was positional either.  No shortness of breath, no leg pain or swelling.  No nausea vomiting or abdominal pain.  No headaches or neck pain or focal weakness or numbness.  States that she did not eat breakfast this morning    No prior similar episodes    REVIEW OF SYSTEMS  Pertinent positives include lightheadedness. Pertinent negatives include no headache. See HPI for details. All other systems reviewed and are negative.    PAST MEDICAL HISTORY   has a past medical history of ASTHMA, Hypertension, and Physiological ovarian cysts.    SURGICAL HISTORY   has a past surgical history that includes vag deliv only,prev c-sectn; tubal ligation; cystoscopy (3/24/2009); cholecystectomy; vaginal hysterectomy scope total (3/24/2009); compl.ulnar nerve transposition w/ poss autograft (2003); mass excision general (Left, 2003); and cholecystectomy.    FAMILY HISTORY  Family History   Problem Relation Age of Onset   • Heart Disease Father        SOCIAL HISTORY  Social History     Socioeconomic History   • Marital status:      Spouse name: Not on file   • Number of children: 4   • Years of education: Not on file   • Highest education level: Not on file   Occupational History     Employer: [employer] [id number]   Social Needs   • Financial  "resource strain: Not on file   • Food insecurity     Worry: Not on file     Inability: Not on file   • Transportation needs     Medical: Not on file     Non-medical: Not on file   Tobacco Use   • Smoking status: Current Every Day Smoker     Packs/day: 0.25     Types: Cigarettes     Last attempt to quit: 2/2/2017     Years since quitting: 3.4   • Smokeless tobacco: Never Used   • Tobacco comment: 3-4/day   Substance and Sexual Activity   • Alcohol use: No     Alcohol/week: 0.0 oz     Comment: 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, 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)   • Drug use: No   • Sexual activity: Yes     Partners: Male   Lifestyle   • Physical activity     Days per week: Not on file     Minutes per session: Not on file   • Stress: Not on file   Relationships   • Social connections     Talks on phone: Not on file     Gets together: Not on file     Attends Yazidi service: Not on file     Active member of club or organization: Not on file     Attends meetings of clubs or organizations: Not on file     Relationship status: Not on file   • Intimate partner violence     Fear of current or ex partner: Not on file     Emotionally abused: Not on file     Physically abused: Not on file     Forced sexual activity: Not on file   Other Topics Concern   • Not on file   Social History Narrative   • Not on file      Social History     Substance and Sexual Activity   Drug Use No       CURRENT MEDICATIONS  Home Medications    **Home medications have not yet been reviewed for this encounter**         ALLERGIES  Allergies   Allergen Reactions   • Bactrim Rash     Rxn - about 2016     • Iodine Nausea   • Tetanus Toxoid Rash and Swelling     Rash & swelling from tetanus vaccine        PHYSICAL EXAM  VITAL SIGNS: /61   Pulse (!) 46   Temp 37.2 °C (98.9 °F)   Resp (!) 11   Ht 1.575 m (5' 2\")   Wt 97.5 kg (215 lb)   LMP 04/17/2008   SpO2 97%   BMI 39.32 kg/m²   Pulse ox interpretation: I interpret this pulse ox as " normal.    Constitutional: Alert anxious appearing.  HENT: No signs of trauma, Bilateral external ears normal, Nose normal.   Eyes: Pupils are equal and reactive, Conjunctiva normal, Non-icteric.   Neck: Normal range of motion, No tenderness, Supple, No stridor.   Lymphatic: No lymphadenopathy noted.   Cardiovascular: Regular rate and rhythm, no murmurs.   Thorax & Lungs: Normal breath sounds, No respiratory distress, No wheezing, No chest tenderness.   Abdomen: Bowel sounds normal, Soft, No tenderness, No masses, No pulsatile masses. No peritoneal signs.  Skin: Warm, Dry, No erythema, No rash.   Back: No bony tenderness, No CVA tenderness.   Extremities: Intact distal pulses, No edema, No tenderness, No cyanosis, Negative Quique's sign.  Musculoskeletal: Good range of motion in all major joints. No tenderness to palpation or major deformities noted.   Neurologic: Alert , Normal motor function, Normal sensory function, No focal deficits noted.   Psychiatric: Affect normal, Judgment normal, Mood normal.     DIAGNOSTIC STUDIES / PROCEDURES    EKG  Results for orders placed or performed during the hospital encounter of 07/28/20   CBC WITH DIFFERENTIAL   Result Value Ref Range    WBC 7.7 4.8 - 10.8 K/uL    RBC 4.89 4.20 - 5.40 M/uL    Hemoglobin 15.7 12.0 - 16.0 g/dL    Hematocrit 47.4 (H) 37.0 - 47.0 %    MCV 96.9 81.4 - 97.8 fL    MCH 32.1 27.0 - 33.0 pg    MCHC 33.1 (L) 33.6 - 35.0 g/dL    RDW 46.9 35.9 - 50.0 fL    Platelet Count 218 164 - 446 K/uL    MPV 9.8 9.0 - 12.9 fL    Neutrophils-Polys 51.30 44.00 - 72.00 %    Lymphocytes 42.50 (H) 22.00 - 41.00 %    Monocytes 4.70 0.00 - 13.40 %    Eosinophils 0.90 0.00 - 6.90 %    Basophils 0.30 0.00 - 1.80 %    Immature Granulocytes 0.30 0.00 - 0.90 %    Nucleated RBC 0.00 /100 WBC    Neutrophils (Absolute) 3.95 2.00 - 7.15 K/uL    Lymphs (Absolute) 3.27 1.00 - 4.80 K/uL    Monos (Absolute) 0.36 0.00 - 0.85 K/uL    Eos (Absolute) 0.07 0.00 - 0.51 K/uL    Baso (Absolute)  0.02 0.00 - 0.12 K/uL    Immature Granulocytes (abs) 0.02 0.00 - 0.11 K/uL    NRBC (Absolute) 0.00 K/uL   COMP METABOLIC PANEL   Result Value Ref Range    Sodium 136 135 - 145 mmol/L    Potassium 4.2 3.6 - 5.5 mmol/L    Chloride 102 96 - 112 mmol/L    Co2 22 20 - 33 mmol/L    Anion Gap 12.0 7.0 - 16.0    Glucose 97 65 - 99 mg/dL    Bun 19 8 - 22 mg/dL    Creatinine 0.67 0.50 - 1.40 mg/dL    Calcium 10.0 8.5 - 10.5 mg/dL    AST(SGOT) 11 (L) 12 - 45 U/L    ALT(SGPT) 25 2 - 50 U/L    Alkaline Phosphatase 74 30 - 99 U/L    Total Bilirubin 0.4 0.1 - 1.5 mg/dL    Albumin 4.1 3.2 - 4.9 g/dL    Total Protein 6.7 6.0 - 8.2 g/dL    Globulin 2.6 1.9 - 3.5 g/dL    A-G Ratio 1.6 g/dL   TROPONIN   Result Value Ref Range    Troponin T <6 6 - 19 ng/L   ESTIMATED GFR   Result Value Ref Range    GFR If African American >60 >60 mL/min/1.73 m 2    GFR If Non African American >60 >60 mL/min/1.73 m 2   TROPONIN   Result Value Ref Range    Troponin T <6 6 - 19 ng/L   EKG (NOW)   Result Value Ref Range    Report       [facility]    Test Date:  2020  Pt Name:    ERENDIRA MARRERO                  Department: ER  MRN:        [id number]                      Room:       [facility]  Gender:     Female                       Technician: [id number]  :        1965                   Requested By:ER TRIAGE PROTOCOL  Order #:    [id number]                    Reading MD: CHAKA VALENTIN MD    Measurements  Intervals                                Axis  Rate:       58                           P:  CA:                                      QRS:        -34  QRSD:       100                          T:          16  QT:         448  QTc:        441    Interpretive Statements  SINUS RHYTHM  BORDERLINE LOW VOLTAGE IN FRONTAL LEADS  Compared to ECG 2019 11:08:05  Electronically Signed On 2020 10:48:37 PDT by CHAKA VALENTIN MD     EKG   Result Value Ref Range    Report       [facility]    Test Date:  2020  Pt Name:    ERENDIRA MARRERO                  Department: ER  MRN:        [id number]                      Room:       [facility]  Gender:     Female                       Technician: [id number]  :        1965                   Requested By:ER TRIAGE PROTOCOL  Order #:    [id number]                    Reading MD: CHAKA VALENTIN MD    Measurements  Intervals                                Axis  Rate:       49                           P:          68  NV:         160                          QRS:        -23  QRSD:       110                          T:          -10  QT:         472  QTc:        427    Interpretive Statements  SINUS BRADYCARDIA  Compared to ECG 2020 10:24:15  no change  Electronically Signed On 2020 15:03:30 PDT by CHAKA VALENTIN MD       .    RADIOLOGY  DX-CHEST-PORTABLE (1 VIEW)   Final Result      No acute cardiopulmonary process is seen.        The radiologist's interpretation of all radiological studies have been reviewed and images independently viewed by me.    COURSE & MEDICAL DECISION MAKING  Nursing notes, VS, PMSFHx reviewed in chart.    10:32 AM Patient seen and examined at bedside. Ordered for cbc, cmp, trop, ecg ,xr to evaluate her symptoms.     Patient reevaluated, she is comfortable at this time, pending repeat troponin, ECG      Patient reevaluated again, still comfortable, updated on all results and will plan for discharge        This patient was cared for during the COVID-19 pandemic.  History and physical exam may be limited/truncated by the inherent challenges of PPE and the need to decrease staff exposure to novel coronavirus.  Some aspects of disease management may be different to protect staff and help slow the spread of disease. I verified that, if possible, the patient was wearing a mask and I was wearing appropriate PPE every time I encountered the patient.     Current [facility] COVID19 protocol followed        Decision Making:  This is a 54  y.o. female presenting with some lightheadedness.  Patient did not eat breakfast this morning I think this is likely contributing, and she does have some anxiety surrounding the event as well.  No neurologic signs/symptoms were present to suggest a neurogenic cause such as CVA/TIA. There was no witnessed seizure activity or history or residual neuro deficit to suggest seizure. The patient has no valvular abnormality on my examination to suggest valvular cause or hypertrophic cardiomyopathy. The ekg does not show any evidence of dangerous arrythmia, prolonged intervals, early excitation, or other abnormality such as an accessory pathway, qt prolongation, or brugada syndrome. ACS or MI are unlikely causes given nature of sx, unremarkable ECG and given the patients improvement the likely of acs of mi is very low. Other cardiac causes are also unlikely based on my history and physical examination.  Serious bacterial illness was considered but ruled out by history and physical exam.   There is no evidence of metabolic abnormalities to explain this episode of syncope on labs.  As the patient is now improved there is no evidence of emergent toxic, metabolic, or endocrine abnormalities.      Patient has no heart disease history, no systolic blood pressure less than 90 or greater than 180, no elevated troponin, normal ECG without evidence of abnormal QRS axis or QRS prolonged duration or prolonged QT, evaluation not suggestive of cardiac syncope (no sudden syncope, no palpitations, no injury suggesting sudden fall) and there was predisposition to vasovagal symptoms.  Unremarkable cardiac monitoring over the course in emergency department. As such patient low risk by [place] syncope criteria     The patient will return for new or worsening symptoms and is stable at the time of discharge.    The patient is referred to a primary physician for blood pressure management, diabetic screening, and for all other preventative health  concerns.    Patient is also asking for medication for her stomach acid and I given her prescription for omeprazole      DISPOSITION:  Patient will be discharged home in stable condition.    FOLLOW UP:  Jose Miguel Chaudhari P.A.-C.  [address]  [facility] [address]  [facility] [id number] [phone]    In 1 week        OUTPATIENT MEDICATIONS:  Discharge Medication List as of 7/28/2020  3:07 PM      START taking these medications    Details   omeprazole (PRILOSEC) 20 MG delayed-release capsule Take 1 Cap by mouth every day for 30 days., Disp-30 Cap,R-0, Print Rx Paper               FINAL IMPRESSION  1. Near syncope    2. Situational anxiety          The note accurately reflects work and decisions made by me.  Leonardo Ho M.D.  7/28/2020  3:35 PM

## 2020-08-21 ENCOUNTER — OFFICE VISIT (OUTPATIENT)
Dept: URGENT CARE | Facility: PHYSICIAN GROUP | Age: 55
End: 2020-08-21
Payer: COMMERCIAL

## 2020-08-21 ENCOUNTER — APPOINTMENT (OUTPATIENT)
Dept: RADIOLOGY | Facility: IMAGING CENTER | Age: 55
End: 2020-08-21
Attending: FAMILY MEDICINE
Payer: COMMERCIAL

## 2020-08-21 VITALS
BODY MASS INDEX: 38.96 KG/M2 | HEIGHT: 64 IN | RESPIRATION RATE: 16 BRPM | HEART RATE: 75 BPM | SYSTOLIC BLOOD PRESSURE: 122 MMHG | WEIGHT: 228.2 LBS | OXYGEN SATURATION: 98 % | DIASTOLIC BLOOD PRESSURE: 84 MMHG | TEMPERATURE: 98 F

## 2020-08-21 DIAGNOSIS — J02.9 PHARYNGITIS, UNSPECIFIED ETIOLOGY: ICD-10-CM

## 2020-08-21 DIAGNOSIS — R05.9 COUGH: ICD-10-CM

## 2020-08-21 LAB
INT CON NEG: NORMAL
INT CON POS: NORMAL
S PYO AG THROAT QL: NEGATIVE

## 2020-08-21 PROCEDURE — 99214 OFFICE O/P EST MOD 30 MIN: CPT | Performed by: FAMILY MEDICINE

## 2020-08-21 PROCEDURE — 71046 X-RAY EXAM CHEST 2 VIEWS: CPT | Mod: TC | Performed by: FAMILY MEDICINE

## 2020-08-21 PROCEDURE — 87880 STREP A ASSAY W/OPTIC: CPT | Performed by: FAMILY MEDICINE

## 2020-08-21 RX ORDER — BENZONATATE 200 MG/1
200 CAPSULE ORAL 3 TIMES DAILY PRN
Qty: 30 CAP | Refills: 0 | Status: SHIPPED | OUTPATIENT
Start: 2020-08-21 | End: 2022-04-25

## 2020-08-21 RX ORDER — LIDOCAINE HYDROCHLORIDE 20 MG/ML
15 SOLUTION OROPHARYNGEAL EVERY 4 HOURS PRN
Qty: 120 ML | Refills: 0 | Status: SHIPPED | OUTPATIENT
Start: 2020-08-21 | End: 2022-04-25

## 2020-08-21 ASSESSMENT — ENCOUNTER SYMPTOMS
EYE DISCHARGE: 0
MYALGIAS: 0
NAUSEA: 0
WEIGHT LOSS: 0
EYE REDNESS: 0
VOMITING: 0

## 2020-08-21 ASSESSMENT — FIBROSIS 4 INDEX: FIB4 SCORE: 0.53

## 2020-08-21 NOTE — PROGRESS NOTES
"Subjective:      Sharmin Cardozo is a 55 y.o. female who presents with Sore Throat (Sore throat, L tonsile hurts more,  painful swallowing, pain is going into L ear, cough, runny nose, x3 days )            Sore throat and left swollen tonsil.  No fever/chill.  Tolerating fluids with normal urine output.  No rash.  No known exposures.  She has associated nasal congestion, left earache. Productive cough with blood in sputum. No new shortness of breath or wheezing.  Minimal relief with OTC medications of moderate severity symptoms.  No other aggravating or alleviating factors.  She was swabbed for COVID-19 at [facility] this morning.      Review of Systems   Constitutional: Positive for malaise/fatigue. Negative for weight loss.   Eyes: Negative for discharge and redness.   Gastrointestinal: Negative for nausea and vomiting.   Musculoskeletal: Negative for joint pain and myalgias.   Skin: Negative for itching and rash.   .  Medications, Allergies, and current problem list reviewed today in Epic         Objective:     /84 (BP Location: Left arm, Patient Position: Sitting, BP Cuff Size: Large adult)   Pulse 75   Temp 36.7 °C (98 °F) (Temporal)   Resp 16   Ht 1.626 m (5' 4\")   Wt 103.5 kg (228 lb 3.2 oz)   LMP 04/17/2008   SpO2 98%   BMI 39.17 kg/m²      Physical Exam  Constitutional:       General: She is not in acute distress.     Appearance: She is well-developed.   HENT:      Head: Normocephalic and atraumatic.      Right Ear: Tympanic membrane normal.      Left Ear: Tympanic membrane normal.      Nose: Nose normal. No congestion.      Mouth/Throat:      Mouth: Mucous membranes are moist.      Pharynx: Posterior oropharyngeal erythema present.   Eyes:      Conjunctiva/sclera: Conjunctivae normal.   Neck:      Musculoskeletal: Neck supple.   Cardiovascular:      Rate and Rhythm: Normal rate and regular rhythm.      Heart sounds: Normal heart sounds. No murmur.   Pulmonary:      Effort: Pulmonary effort is " normal.      Breath sounds: Normal breath sounds. No wheezing.   Lymphadenopathy:      Cervical: Cervical adenopathy (tender left anterior) present.   Skin:     General: Skin is warm and dry.      Findings: No rash.   Neurological:      Mental Status: She is alert and oriented to person, place, and time.                 Assessment/Plan:       CXR: no acute cardiopulmonary process per radiology    1. Pharyngitis, unspecified etiology  POCT Rapid Strep A    lidocaine (XYLOCAINE) 2 % Solution   2. Cough  DX-CHEST-2 VIEWS    benzonatate (TESSALON) 200 MG capsule     Differential diagnosis, natural history, supportive care, and indications for immediate follow-up discussed at length.     Patient to f/u covid19 testing  Self isolate per protocol

## 2020-09-07 ENCOUNTER — HOSPITAL ENCOUNTER (EMERGENCY)
Dept: HOSPITAL 8 - ED | Age: 55
Discharge: HOME | End: 2020-09-07
Payer: MEDICAID

## 2020-09-07 VITALS — WEIGHT: 231.93 LBS | HEIGHT: 62 IN | BODY MASS INDEX: 42.68 KG/M2

## 2020-09-07 VITALS — DIASTOLIC BLOOD PRESSURE: 80 MMHG | SYSTOLIC BLOOD PRESSURE: 100 MMHG

## 2020-09-07 DIAGNOSIS — F17.200: ICD-10-CM

## 2020-09-07 DIAGNOSIS — I10: ICD-10-CM

## 2020-09-07 DIAGNOSIS — M54.16: ICD-10-CM

## 2020-09-07 DIAGNOSIS — Z90.710: ICD-10-CM
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
## 2020-09-07 DIAGNOSIS — M47.816: ICD-10-CM
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
## 2020-09-07 DIAGNOSIS — M79.662: ICD-10-CM

## 2020-09-07 DIAGNOSIS — Z85.41: ICD-10-CM
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
## 2020-09-07 DIAGNOSIS — G89.11: Primary | ICD-10-CM

## 2020-09-07 PROCEDURE — 96372 THER/PROPH/DIAG INJ SC/IM: CPT

## 2020-09-07 PROCEDURE — 72131 CT LUMBAR SPINE W/O DYE: CPT

## 2020-09-07 PROCEDURE — 93971 EXTREMITY STUDY: CPT

## 2020-09-07 PROCEDURE — 99285 EMERGENCY DEPT VISIT HI MDM: CPT

## 2020-09-07 NOTE — NUR
Pt c/o of LLE pain, spontaneous, for multiple days. States painful to the 
touch. No obvious deformity, mild swelling, no discoloration noted.

## 2021-06-17 ENCOUNTER — HOSPITAL ENCOUNTER (EMERGENCY)
Dept: HOSPITAL 8 - ED | Age: 56
Discharge: HOME | End: 2021-06-17
Payer: COMMERCIAL

## 2021-06-17 VITALS — SYSTOLIC BLOOD PRESSURE: 132 MMHG | DIASTOLIC BLOOD PRESSURE: 78 MMHG

## 2021-06-17 VITALS — BODY MASS INDEX: 43.82 KG/M2 | HEIGHT: 62 IN | WEIGHT: 238.1 LBS

## 2021-06-17 DIAGNOSIS — R63.0: ICD-10-CM

## 2021-06-17 DIAGNOSIS — I10: ICD-10-CM

## 2021-06-17 DIAGNOSIS — R10.84: Primary | ICD-10-CM

## 2021-06-17 DIAGNOSIS — Z85.41: ICD-10-CM

## 2021-06-17 DIAGNOSIS — Z90.710: ICD-10-CM

## 2021-06-17 DIAGNOSIS — R11.0: ICD-10-CM

## 2021-06-17 DIAGNOSIS — E66.01: ICD-10-CM

## 2021-06-17 DIAGNOSIS — J45.909: ICD-10-CM

## 2021-06-17 LAB
ALBUMIN SERPL-MCNC: 3.8 G/DL (ref 3.4–5)
ALP SERPL-CCNC: 82 U/L (ref 45–117)
ALT SERPL-CCNC: 32 U/L (ref 12–78)
ANION GAP SERPL CALC-SCNC: 6 MMOL/L (ref 5–15)
BASOPHILS # BLD AUTO: 0 X10^3/UL (ref 0–0.1)
BASOPHILS NFR BLD AUTO: 1 % (ref 0–1)
BILIRUB SERPL-MCNC: 0.7 MG/DL (ref 0.2–1)
CALCIUM SERPL-MCNC: 9.4 MG/DL (ref 8.5–10.1)
CHLORIDE SERPL-SCNC: 107 MMOL/L (ref 98–107)
CREAT SERPL-MCNC: 0.74 MG/DL (ref 0.55–1.02)
EOSINOPHIL # BLD AUTO: 0.1 X10^3/UL (ref 0–0.4)
EOSINOPHIL NFR BLD AUTO: 1 % (ref 1–7)
ERYTHROCYTE [DISTWIDTH] IN BLOOD BY AUTOMATED COUNT: 13.6 % (ref 9.6–15.2)
LYMPHOCYTES # BLD AUTO: 3.1 X10^3/UL (ref 1–3.4)
LYMPHOCYTES NFR BLD AUTO: 33 % (ref 22–44)
MCH RBC QN AUTO: 32.9 PG (ref 27–34.8)
MCHC RBC AUTO-ENTMCNC: 34.6 G/DL (ref 32.4–35.8)
MICROSCOPIC: (no result)
MONOCYTES # BLD AUTO: 0.4 X10^3/UL (ref 0.2–0.8)
MONOCYTES NFR BLD AUTO: 5 % (ref 2–9)
NEUTROPHILS # BLD AUTO: 5.7 X10^3/UL (ref 1.8–6.8)
NEUTROPHILS NFR BLD AUTO: 61 % (ref 42–75)
PLATELET # BLD AUTO: 235 X10^3/UL (ref 130–400)
PMV BLD AUTO: 8.4 FL (ref 7.4–10.4)
PROT SERPL-MCNC: 7.3 G/DL (ref 6.4–8.2)
RBC # BLD AUTO: 4.97 X10^6/UL (ref 3.82–5.3)

## 2021-06-17 PROCEDURE — 80053 COMPREHEN METABOLIC PANEL: CPT

## 2021-06-17 PROCEDURE — 85025 COMPLETE CBC W/AUTO DIFF WBC: CPT

## 2021-06-17 PROCEDURE — 81003 URINALYSIS AUTO W/O SCOPE: CPT

## 2021-06-17 PROCEDURE — 74177 CT ABD & PELVIS W/CONTRAST: CPT

## 2021-06-17 PROCEDURE — 99285 EMERGENCY DEPT VISIT HI MDM: CPT

## 2021-06-17 PROCEDURE — 83690 ASSAY OF LIPASE: CPT

## 2021-06-17 PROCEDURE — 36415 COLL VENOUS BLD VENIPUNCTURE: CPT

## 2021-09-10 ENCOUNTER — OFFICE VISIT (OUTPATIENT)
Dept: URGENT CARE | Facility: PHYSICIAN GROUP | Age: 56
End: 2021-09-10
Payer: COMMERCIAL

## 2021-09-10 RX ORDER — CYCLOBENZAPRINE HCL 10 MG
TABLET ORAL
COMMUNITY
Start: 2021-08-04 | End: 2024-03-15

## 2022-04-25 PROBLEM — M17.9 OSTEOARTHRITIS, KNEE: Status: ACTIVE | Noted: 2022-04-25

## 2022-10-20 PROBLEM — M17.12 ARTHRITIS OF LEFT KNEE: Status: ACTIVE | Noted: 2022-10-20

## 2024-02-07 ENCOUNTER — HOSPITAL ENCOUNTER (EMERGENCY)
Facility: MEDICAL CENTER | Age: 59
End: 2024-02-07
Attending: EMERGENCY MEDICINE
Payer: COMMERCIAL

## 2024-02-07 VITALS
WEIGHT: 269.09 LBS | TEMPERATURE: 99 F | RESPIRATION RATE: 16 BRPM | HEIGHT: 64 IN | OXYGEN SATURATION: 94 % | SYSTOLIC BLOOD PRESSURE: 185 MMHG | HEART RATE: 80 BPM | DIASTOLIC BLOOD PRESSURE: 79 MMHG | BODY MASS INDEX: 45.94 KG/M2

## 2024-02-07 DIAGNOSIS — I10 HYPERTENSION, UNSPECIFIED TYPE: ICD-10-CM

## 2024-02-07 PROCEDURE — 99284 EMERGENCY DEPT VISIT MOD MDM: CPT

## 2024-02-07 PROCEDURE — 99406 BEHAV CHNG SMOKING 3-10 MIN: CPT

## 2024-02-07 ASSESSMENT — FIBROSIS 4 INDEX: FIB4 SCORE: 0.89

## 2024-02-07 NOTE — ED NOTES
PT has plan to take second metoprolol on MD orders this afternoon when she returns home.  Pt to begin 50 mg metoprolol po tomorrow am, along with 20 mg lisinopril dosing.    Pt educated about checking BP BID.  Provided sheet to record BP on.    All lines and monitors DC'd.  Discharge instructions given, questions answered.  Ambulated out of ER, escorted by RN.  Pt states all belongings in possession.  Instructed not to drive after pain medication and pt verbalizes understanding.  RX x0 given.

## 2024-02-07 NOTE — ED NOTES
Discharge teaching and paperwork provided by RN and all questions/concerns answered. VSS,  assessment stable. Given information regarding home care and reasons to follow up with ED or primary MD. Patient discharged to the care of self and ambulated out of the ED.

## 2024-02-07 NOTE — ED TRIAGE NOTES
"Chief Complaint   Patient presents with    Sent by MD     Pt sent by MD, pt went to  last week and had bp elevated, was told to come back today, today pts bp 194/103 and 184/104 at Dr office, pt was told to come to ER to have bp evaluated.  Pt states he has been having headache 5/10, pt on metoprolol 25mg daily and lisinopril 20 mg daily     BP (!) 163/124   Pulse 81   Temp 37.4 °C (99.4 °F) (Temporal)   Resp 18   Ht 1.626 m (5' 4\")   Wt 122 kg (269 lb 1.5 oz)   LMP 04/17/2008   SpO2 96%   BMI 46.19 kg/m²     "

## 2024-02-07 NOTE — ED PROVIDER NOTES
ED Provider Note    CHIEF COMPLAINT  Chief Complaint   Patient presents with    Sent by MD     Pt sent by MD, pt went to dr last week and had bp elevated, was told to come back today, today pts bp 194/103 and 184/104 at Dr office, pt was told to come to ER to have bp evaluated.  Pt states he has been having headache 5/10, pt on metoprolol 25mg daily and lisinopril 20 mg daily     HPI/ROS    Sharmin Cardozo is a 58 y.o. female who presents for evaluation of high blood pressure.  The patient states has been on a hard time controlling her blood pressure with her primary care provider.  She states that they just added lisinopril 20 mg to her regimen daily and she is been taking metoprolol 25 mg daily.  She states occasionally she does have some headaches as well as some tingling around her lips.  She states she has been anxious over her blood pressure.  She has not had any chest pain or change in her breathing patterns.  She is down to 3 cigarettes a day and we had a good discussion regarding the need for complete smoking cessation.    PAST MEDICAL HISTORY   has a past medical history of ASTHMA, Hypertension, and Physiological ovarian cysts.    SURGICAL HISTORY   has a past surgical history that includes vag deliv only,prev c-sectn; tubal ligation; cystoscopy (3/24/2009); cholecystectomy; vaginal hysterectomy scope total (3/24/2009); compl.ulnar nerve transposition w/ poss autograft (); mass excision general (Left, ); cholecystectomy; and total knee arthroplasty (Left, 10/20/2022).    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 (Registered Nurse) on 02/07/24 at 1314  Med List Status: Not Addressed     Medication Last Dose Status   albuterol 108 (90 Base) MCG/ACT Aero Soln inhalation aerosol  Active   amLODIPine (NORVASC) 10 MG Tab  Active   cyclobenzaprine (FLEXERIL) 10 mg Tab  Active   metoprolol SR (TOPROL XL) 25 MG TABLET SR 24 HR  Active   Multiple Vitamin (DAILY VITAMIN PO)  Active   telmisartan-hydrochlorothiazide (MICARDIS HCT) 80-12.5 MG per tablet  Active                    ALLERGIES  Allergies   Allergen Reactions    Bactrim Rash     Rxn - about 2016      Iodine Nausea    Sulfa Drugs     Tetanus Toxoid Rash and Swelling     Rash & swelling from tetanus vaccine        PHYSICAL EXAM  VITAL SIGNS: BP (!) 163/124   Pulse 81   Temp 37.4 °C (99.4 °F) (Temporal)   Resp 18   Ht 1.626 m (5' 4\")   Wt 122 kg (269 lb 1.5 oz)   LMP 04/17/2008   SpO2 96%   BMI 46.19 kg/m²    In general the patient does not appear toxic    HEENT unremarkable    Pulmonary the patient's lungs are slightly diminished throughout    Cardiovascular S1-S2 with a regular rate and rhythm    GI abdomen soft    Skin no rashes, pallor, no jaundice    Extremities no distal edema      COURSE & MEDICAL DECISION MAKING    This a 58-year-old female who presents the emergency department with hypertension.  At this time she does not have any evidence of endorgan dysfunction.  I had a good discussion with the patient regarding the need for emergent blood pressure management and for her to return if there is evidence of endorgan dysfunction.  Otherwise we will increase her metoprolol to 50 mg daily and she will check her blood pressure on a daily basis.  She will follow-up with her primary care provider next week as scheduled.    I also had " a good discussion with the patient regarding the need for smoking cessation.    FINAL DIAGNOSIS  1.  Hypertension  2.Leeroy Heller M.D. spent greater than 3 minutes with the patient explaining the importance of smoking cessation.       Disposition  The patient will be discharged in stable condition       Electronically signed by: Leeroy Heller M.D., 2/7/2024 2:24 PM

## 2024-02-07 NOTE — DISCHARGE INSTRUCTIONS
Increase your metoprolol to 50 mg a day.  Follow-up your doctor next week.  Chart your blood pressure on a daily basis.

## 2024-03-15 ENCOUNTER — HOSPITAL ENCOUNTER (INPATIENT)
Facility: MEDICAL CENTER | Age: 59
LOS: 1 days | DRG: 191 | End: 2024-03-16
Attending: EMERGENCY MEDICINE | Admitting: STUDENT IN AN ORGANIZED HEALTH CARE EDUCATION/TRAINING PROGRAM
Payer: COMMERCIAL

## 2024-03-15 ENCOUNTER — OFFICE VISIT (OUTPATIENT)
Dept: URGENT CARE | Facility: PHYSICIAN GROUP | Age: 59
End: 2024-03-15
Payer: COMMERCIAL

## 2024-03-15 ENCOUNTER — APPOINTMENT (OUTPATIENT)
Dept: RADIOLOGY | Facility: MEDICAL CENTER | Age: 59
DRG: 191 | End: 2024-03-15
Payer: COMMERCIAL

## 2024-03-15 ENCOUNTER — APPOINTMENT (OUTPATIENT)
Dept: RADIOLOGY | Facility: MEDICAL CENTER | Age: 59
DRG: 191 | End: 2024-03-15
Attending: EMERGENCY MEDICINE
Payer: COMMERCIAL

## 2024-03-15 VITALS
HEIGHT: 62 IN | HEART RATE: 72 BPM | TEMPERATURE: 97.1 F | BODY MASS INDEX: 49.5 KG/M2 | WEIGHT: 269 LBS | OXYGEN SATURATION: 92 % | DIASTOLIC BLOOD PRESSURE: 120 MMHG | SYSTOLIC BLOOD PRESSURE: 150 MMHG | RESPIRATION RATE: 16 BRPM

## 2024-03-15 DIAGNOSIS — R09.02 HYPOXIA: ICD-10-CM

## 2024-03-15 DIAGNOSIS — R68.89 FLU-LIKE SYMPTOMS: ICD-10-CM

## 2024-03-15 DIAGNOSIS — I10 HYPERTENSION, UNSPECIFIED TYPE: ICD-10-CM

## 2024-03-15 DIAGNOSIS — R06.02 SOB (SHORTNESS OF BREATH): ICD-10-CM

## 2024-03-15 DIAGNOSIS — R07.9 CHEST PAIN, UNSPECIFIED TYPE: ICD-10-CM

## 2024-03-15 DIAGNOSIS — R06.02 SHORTNESS OF BREATH: Primary | ICD-10-CM

## 2024-03-15 DIAGNOSIS — R79.81 BORDERLINE LOW O2 SATURATION: ICD-10-CM

## 2024-03-15 DIAGNOSIS — Z72.0 TOBACCO ABUSE: ICD-10-CM

## 2024-03-15 DIAGNOSIS — J45.41 MODERATE PERSISTENT ASTHMA WITH ACUTE EXACERBATION: ICD-10-CM

## 2024-03-15 DIAGNOSIS — I10 ESSENTIAL HYPERTENSION: ICD-10-CM

## 2024-03-15 DIAGNOSIS — Z71.6 TOBACCO ABUSE COUNSELING: ICD-10-CM

## 2024-03-15 DIAGNOSIS — R05.8 PRODUCTIVE COUGH: ICD-10-CM

## 2024-03-15 DIAGNOSIS — J45.901 ASTHMA WITH ACUTE EXACERBATION, UNSPECIFIED ASTHMA SEVERITY, UNSPECIFIED WHETHER PERSISTENT: ICD-10-CM

## 2024-03-15 DIAGNOSIS — J45.51 SEVERE PERSISTENT ASTHMA WITH ACUTE EXACERBATION: Primary | ICD-10-CM

## 2024-03-15 PROBLEM — M54.40 LUMBAGO OF LUMBAR REGION WITH SCIATICA: Status: ACTIVE | Noted: 2021-11-12

## 2024-03-15 PROBLEM — M53.86 SCIATICA ASSOCIATED WITH DISORDER OF LUMBAR SPINE: Status: ACTIVE | Noted: 2021-11-12

## 2024-03-15 PROBLEM — I20.0 UNSTABLE ANGINA (HCC): Status: ACTIVE | Noted: 2023-09-11

## 2024-03-15 PROBLEM — E66.813 CLASS 3 SEVERE OBESITY IN ADULT (HCC): Status: ACTIVE | Noted: 2017-02-08

## 2024-03-15 PROBLEM — R05.9 COUGH: Status: ACTIVE | Noted: 2024-03-15

## 2024-03-15 PROBLEM — R07.89 ATYPICAL CHEST PAIN: Status: ACTIVE | Noted: 2023-09-11

## 2024-03-15 PROBLEM — R52 INTRACTABLE PAIN: Status: ACTIVE | Noted: 2021-11-13

## 2024-03-15 LAB
ALBUMIN SERPL BCP-MCNC: 4.5 G/DL (ref 3.2–4.9)
ALBUMIN/GLOB SERPL: 1.4 G/DL
ALP SERPL-CCNC: 108 U/L (ref 30–99)
ALT SERPL-CCNC: 20 U/L (ref 2–50)
ANION GAP SERPL CALC-SCNC: 16 MMOL/L (ref 7–16)
AST SERPL-CCNC: 18 U/L (ref 12–45)
BASOPHILS # BLD AUTO: 0.4 % (ref 0–1.8)
BASOPHILS # BLD: 0.04 K/UL (ref 0–0.12)
BILIRUB SERPL-MCNC: 0.3 MG/DL (ref 0.1–1.5)
BUN SERPL-MCNC: 11 MG/DL (ref 8–22)
CALCIUM ALBUM COR SERPL-MCNC: 9.2 MG/DL (ref 8.5–10.5)
CALCIUM SERPL-MCNC: 9.6 MG/DL (ref 8.5–10.5)
CHLORIDE SERPL-SCNC: 103 MMOL/L (ref 96–112)
CO2 SERPL-SCNC: 22 MMOL/L (ref 20–33)
CREAT SERPL-MCNC: 0.6 MG/DL (ref 0.5–1.4)
CRP SERPL HS-MCNC: 0.99 MG/DL (ref 0–0.75)
EKG IMPRESSION: NORMAL
EOSINOPHIL # BLD AUTO: 0.07 K/UL (ref 0–0.51)
EOSINOPHIL NFR BLD: 0.7 % (ref 0–6.9)
ERYTHROCYTE [DISTWIDTH] IN BLOOD BY AUTOMATED COUNT: 45.6 FL (ref 35.9–50)
EST. AVERAGE GLUCOSE BLD GHB EST-MCNC: 148 MG/DL
FLUAV RNA SPEC QL NAA+PROBE: NEGATIVE
FLUAV RNA SPEC QL NAA+PROBE: NEGATIVE
FLUBV RNA SPEC QL NAA+PROBE: NEGATIVE
FLUBV RNA SPEC QL NAA+PROBE: NEGATIVE
GFR SERPLBLD CREATININE-BSD FMLA CKD-EPI: 105 ML/MIN/1.73 M 2
GLOBULIN SER CALC-MCNC: 3.2 G/DL (ref 1.9–3.5)
GLUCOSE SERPL-MCNC: 110 MG/DL (ref 65–99)
HBA1C MFR BLD: 6.8 % (ref 4–5.6)
HCT VFR BLD AUTO: 53.8 % (ref 37–47)
HGB BLD-MCNC: 18.1 G/DL (ref 12–16)
IMM GRANULOCYTES # BLD AUTO: 0.03 K/UL (ref 0–0.11)
IMM GRANULOCYTES NFR BLD AUTO: 0.3 % (ref 0–0.9)
LYMPHOCYTES # BLD AUTO: 3.02 K/UL (ref 1–4.8)
LYMPHOCYTES NFR BLD: 32.3 % (ref 22–41)
MCH RBC QN AUTO: 30.9 PG (ref 27–33)
MCHC RBC AUTO-ENTMCNC: 33.6 G/DL (ref 32.2–35.5)
MCV RBC AUTO: 91.8 FL (ref 81.4–97.8)
MONOCYTES # BLD AUTO: 0.53 K/UL (ref 0–0.85)
MONOCYTES NFR BLD AUTO: 5.7 % (ref 0–13.4)
NEUTROPHILS # BLD AUTO: 5.65 K/UL (ref 1.82–7.42)
NEUTROPHILS NFR BLD: 60.6 % (ref 44–72)
NRBC # BLD AUTO: 0 K/UL
NRBC BLD-RTO: 0 /100 WBC (ref 0–0.2)
PLATELET # BLD AUTO: 156 K/UL (ref 164–446)
PLATELET # BLD AUTO: 196 K/UL (ref 164–446)
PMV BLD AUTO: 9.9 FL (ref 9–12.9)
POTASSIUM SERPL-SCNC: 4.2 MMOL/L (ref 3.6–5.5)
PROCALCITONIN SERPL-MCNC: 0.06 NG/ML
PROT SERPL-MCNC: 7.7 G/DL (ref 6–8.2)
RBC # BLD AUTO: 5.86 M/UL (ref 4.2–5.4)
RSV RNA SPEC QL NAA+PROBE: NEGATIVE
RSV RNA SPEC QL NAA+PROBE: NEGATIVE
S PYO DNA SPEC NAA+PROBE: NOT DETECTED
SARS-COV-2 RNA RESP QL NAA+PROBE: NEGATIVE
SARS-COV-2 RNA RESP QL NAA+PROBE: NOTDETECTED
SODIUM SERPL-SCNC: 141 MMOL/L (ref 135–145)
WBC # BLD AUTO: 9.3 K/UL (ref 4.8–10.8)

## 2024-03-15 PROCEDURE — A9270 NON-COVERED ITEM OR SERVICE: HCPCS | Performed by: STUDENT IN AN ORGANIZED HEALTH CARE EDUCATION/TRAINING PROGRAM

## 2024-03-15 PROCEDURE — 0241U HCHG SARS-COV-2 COVID-19 NFCT DS RESP RNA 4 TRGT ED POC: CPT

## 2024-03-15 PROCEDURE — 3077F SYST BP >= 140 MM HG: CPT | Performed by: NURSE PRACTITIONER

## 2024-03-15 PROCEDURE — 36415 COLL VENOUS BLD VENIPUNCTURE: CPT

## 2024-03-15 PROCEDURE — 94669 MECHANICAL CHEST WALL OSCILL: CPT

## 2024-03-15 PROCEDURE — 99223 1ST HOSP IP/OBS HIGH 75: CPT | Mod: 25 | Performed by: STUDENT IN AN ORGANIZED HEALTH CARE EDUCATION/TRAINING PROGRAM

## 2024-03-15 PROCEDURE — 1126F AMNT PAIN NOTED NONE PRSNT: CPT | Performed by: NURSE PRACTITIONER

## 2024-03-15 PROCEDURE — 3080F DIAST BP >= 90 MM HG: CPT | Performed by: NURSE PRACTITIONER

## 2024-03-15 PROCEDURE — 94640 AIRWAY INHALATION TREATMENT: CPT | Performed by: NURSE PRACTITIONER

## 2024-03-15 PROCEDURE — 86140 C-REACTIVE PROTEIN: CPT

## 2024-03-15 PROCEDURE — 99215 OFFICE O/P EST HI 40 MIN: CPT | Mod: 25 | Performed by: NURSE PRACTITIONER

## 2024-03-15 PROCEDURE — 770006 HCHG ROOM/CARE - MED/SURG/GYN SEMI*

## 2024-03-15 PROCEDURE — 93005 ELECTROCARDIOGRAM TRACING: CPT

## 2024-03-15 PROCEDURE — 85025 COMPLETE CBC W/AUTO DIFF WBC: CPT

## 2024-03-15 PROCEDURE — 94644 CONT INHLJ TX 1ST HOUR: CPT

## 2024-03-15 PROCEDURE — 700101 HCHG RX REV CODE 250: Performed by: STUDENT IN AN ORGANIZED HEALTH CARE EDUCATION/TRAINING PROGRAM

## 2024-03-15 PROCEDURE — 700111 HCHG RX REV CODE 636 W/ 250 OVERRIDE (IP): Performed by: EMERGENCY MEDICINE

## 2024-03-15 PROCEDURE — 80053 COMPREHEN METABOLIC PANEL: CPT

## 2024-03-15 PROCEDURE — 700101 HCHG RX REV CODE 250: Performed by: EMERGENCY MEDICINE

## 2024-03-15 PROCEDURE — 99406 BEHAV CHNG SMOKING 3-10 MIN: CPT | Performed by: STUDENT IN AN ORGANIZED HEALTH CARE EDUCATION/TRAINING PROGRAM

## 2024-03-15 PROCEDURE — 700102 HCHG RX REV CODE 250 W/ 637 OVERRIDE(OP): Performed by: STUDENT IN AN ORGANIZED HEALTH CARE EDUCATION/TRAINING PROGRAM

## 2024-03-15 PROCEDURE — 84145 PROCALCITONIN (PCT): CPT

## 2024-03-15 PROCEDURE — 94640 AIRWAY INHALATION TREATMENT: CPT

## 2024-03-15 PROCEDURE — 0241U POCT CEPHEID COV-2, FLU A/B, RSV - PCR: CPT | Performed by: NURSE PRACTITIONER

## 2024-03-15 PROCEDURE — 87651 STREP A DNA AMP PROBE: CPT | Performed by: NURSE PRACTITIONER

## 2024-03-15 PROCEDURE — 700105 HCHG RX REV CODE 258: Performed by: STUDENT IN AN ORGANIZED HEALTH CARE EDUCATION/TRAINING PROGRAM

## 2024-03-15 PROCEDURE — 71045 X-RAY EXAM CHEST 1 VIEW: CPT

## 2024-03-15 PROCEDURE — 93005 ELECTROCARDIOGRAM TRACING: CPT | Performed by: EMERGENCY MEDICINE

## 2024-03-15 PROCEDURE — 700111 HCHG RX REV CODE 636 W/ 250 OVERRIDE (IP): Mod: JZ | Performed by: STUDENT IN AN ORGANIZED HEALTH CARE EDUCATION/TRAINING PROGRAM

## 2024-03-15 PROCEDURE — 83036 HEMOGLOBIN GLYCOSYLATED A1C: CPT

## 2024-03-15 PROCEDURE — 99285 EMERGENCY DEPT VISIT HI MDM: CPT

## 2024-03-15 RX ORDER — ACETAMINOPHEN, DEXTROMETHORPHAN HYDROBROMIDE, PHENYLEPHRINE HYDROCHLORIDE, AND TRIPROLIDINE HYDROCHLORIDE 650; 20; 10; 2.5 MG/20ML; MG/20ML; MG/20ML; MG/20ML
20 SOLUTION ORAL
COMMUNITY

## 2024-03-15 RX ORDER — ONDANSETRON 2 MG/ML
4 INJECTION INTRAMUSCULAR; INTRAVENOUS EVERY 4 HOURS PRN
Status: DISCONTINUED | OUTPATIENT
Start: 2024-03-15 | End: 2024-03-16 | Stop reason: HOSPADM

## 2024-03-15 RX ORDER — NICOTINE 21 MG/24HR
21 PATCH, TRANSDERMAL 24 HOURS TRANSDERMAL
Status: DISCONTINUED | OUTPATIENT
Start: 2024-03-15 | End: 2024-03-16 | Stop reason: HOSPADM

## 2024-03-15 RX ORDER — POLYETHYLENE GLYCOL 3350 17 G/17G
1 POWDER, FOR SOLUTION ORAL
Status: DISCONTINUED | OUTPATIENT
Start: 2024-03-15 | End: 2024-03-16

## 2024-03-15 RX ORDER — IBUPROFEN 800 MG/1
800 TABLET ORAL EVERY 8 HOURS PRN
Status: DISCONTINUED | OUTPATIENT
Start: 2024-03-15 | End: 2024-03-16

## 2024-03-15 RX ORDER — BENZONATATE 100 MG/1
100 CAPSULE ORAL 3 TIMES DAILY PRN
Status: DISCONTINUED | OUTPATIENT
Start: 2024-03-15 | End: 2024-03-16

## 2024-03-15 RX ORDER — METOPROLOL SUCCINATE 50 MG/1
50 TABLET, EXTENDED RELEASE ORAL DAILY
COMMUNITY
Start: 2024-02-14

## 2024-03-15 RX ORDER — PROCHLORPERAZINE EDISYLATE 5 MG/ML
5-10 INJECTION INTRAMUSCULAR; INTRAVENOUS EVERY 4 HOURS PRN
Status: DISCONTINUED | OUTPATIENT
Start: 2024-03-15 | End: 2024-03-16 | Stop reason: HOSPADM

## 2024-03-15 RX ORDER — IPRATROPIUM BROMIDE AND ALBUTEROL SULFATE 2.5; .5 MG/3ML; MG/3ML
15 SOLUTION RESPIRATORY (INHALATION) ONCE
Status: DISCONTINUED | OUTPATIENT
Start: 2024-03-15 | End: 2024-03-15

## 2024-03-15 RX ORDER — METOPROLOL SUCCINATE 50 MG/1
50 TABLET, EXTENDED RELEASE ORAL DAILY
Status: DISCONTINUED | OUTPATIENT
Start: 2024-03-16 | End: 2024-03-16 | Stop reason: HOSPADM

## 2024-03-15 RX ORDER — SODIUM CHLORIDE, SODIUM LACTATE, POTASSIUM CHLORIDE, CALCIUM CHLORIDE 600; 310; 30; 20 MG/100ML; MG/100ML; MG/100ML; MG/100ML
1000 INJECTION, SOLUTION INTRAVENOUS CONTINUOUS
Status: DISCONTINUED | OUTPATIENT
Start: 2024-03-15 | End: 2024-03-16

## 2024-03-15 RX ORDER — GUAIFENESIN/DEXTROMETHORPHAN 100-10MG/5
10 SYRUP ORAL EVERY 6 HOURS PRN
Status: DISCONTINUED | OUTPATIENT
Start: 2024-03-15 | End: 2024-03-16

## 2024-03-15 RX ORDER — METHYLPREDNISOLONE SODIUM SUCCINATE 40 MG/ML
40 INJECTION, POWDER, LYOPHILIZED, FOR SOLUTION INTRAMUSCULAR; INTRAVENOUS EVERY 8 HOURS
Status: DISCONTINUED | OUTPATIENT
Start: 2024-03-15 | End: 2024-03-16

## 2024-03-15 RX ORDER — IBUPROFEN 800 MG/1
800 TABLET ORAL EVERY 8 HOURS PRN
Status: ON HOLD | COMMUNITY
End: 2024-03-16

## 2024-03-15 RX ORDER — IPRATROPIUM BROMIDE AND ALBUTEROL SULFATE 2.5; .5 MG/3ML; MG/3ML
3 SOLUTION RESPIRATORY (INHALATION)
Status: DISCONTINUED | OUTPATIENT
Start: 2024-03-15 | End: 2024-03-16 | Stop reason: HOSPADM

## 2024-03-15 RX ORDER — MONTELUKAST SODIUM 10 MG/1
10 TABLET ORAL NIGHTLY
Status: DISCONTINUED | OUTPATIENT
Start: 2024-03-15 | End: 2024-03-16 | Stop reason: HOSPADM

## 2024-03-15 RX ORDER — LISINOPRIL 20 MG/1
20 TABLET ORAL DAILY
Status: ON HOLD | COMMUNITY
End: 2024-03-16

## 2024-03-15 RX ORDER — PREDNISONE 20 MG/1
60 TABLET ORAL ONCE
Status: COMPLETED | OUTPATIENT
Start: 2024-03-15 | End: 2024-03-15

## 2024-03-15 RX ORDER — PROMETHAZINE HYDROCHLORIDE 25 MG/1
12.5-25 TABLET ORAL EVERY 4 HOURS PRN
Status: DISCONTINUED | OUTPATIENT
Start: 2024-03-15 | End: 2024-03-16

## 2024-03-15 RX ORDER — NICOTINE 21 MG/24HR
1 PATCH, TRANSDERMAL 24 HOURS TRANSDERMAL EVERY 24 HOURS
Qty: 30 PATCH | Refills: 0 | Status: SHIPPED | OUTPATIENT
Start: 2024-03-15

## 2024-03-15 RX ORDER — TELMISARTAN 80 MG/1
80 TABLET ORAL
Status: DISCONTINUED | OUTPATIENT
Start: 2024-03-16 | End: 2024-03-16 | Stop reason: HOSPADM

## 2024-03-15 RX ORDER — ALBUTEROL SULFATE 90 UG/1
2 AEROSOL, METERED RESPIRATORY (INHALATION) EVERY 6 HOURS PRN
Status: DISCONTINUED | OUTPATIENT
Start: 2024-03-15 | End: 2024-03-15

## 2024-03-15 RX ORDER — HYDROCHLOROTHIAZIDE 25 MG/1
12.5 TABLET ORAL
Status: DISCONTINUED | OUTPATIENT
Start: 2024-03-16 | End: 2024-03-16

## 2024-03-15 RX ORDER — MAGNESIUM SULFATE HEPTAHYDRATE 40 MG/ML
2 INJECTION, SOLUTION INTRAVENOUS ONCE
Status: COMPLETED | OUTPATIENT
Start: 2024-03-15 | End: 2024-03-15

## 2024-03-15 RX ORDER — LABETALOL HYDROCHLORIDE 5 MG/ML
10 INJECTION, SOLUTION INTRAVENOUS EVERY 4 HOURS PRN
Status: DISCONTINUED | OUTPATIENT
Start: 2024-03-15 | End: 2024-03-16 | Stop reason: HOSPADM

## 2024-03-15 RX ORDER — HEPARIN SODIUM 5000 [USP'U]/ML
5000 INJECTION, SOLUTION INTRAVENOUS; SUBCUTANEOUS EVERY 8 HOURS
Status: DISCONTINUED | OUTPATIENT
Start: 2024-03-15 | End: 2024-03-16

## 2024-03-15 RX ORDER — PROMETHAZINE HYDROCHLORIDE 25 MG/1
12.5-25 SUPPOSITORY RECTAL EVERY 4 HOURS PRN
Status: DISCONTINUED | OUTPATIENT
Start: 2024-03-15 | End: 2024-03-16

## 2024-03-15 RX ORDER — OMEPRAZOLE 20 MG/1
20 CAPSULE, DELAYED RELEASE ORAL 2 TIMES DAILY
Status: DISCONTINUED | OUTPATIENT
Start: 2024-03-15 | End: 2024-03-16 | Stop reason: HOSPADM

## 2024-03-15 RX ORDER — ACETAMINOPHEN, DEXTROMETHORPHAN HYDROBROMIDE, GUAIFENESIN, AND PHENYLEPHRINE HYDROCHLORIDE 650; 20; 400; 10 MG/20ML; MG/20ML; MG/20ML; MG/20ML
20 SOLUTION ORAL EVERY 4 HOURS PRN
COMMUNITY

## 2024-03-15 RX ORDER — ONDANSETRON 4 MG/1
4 TABLET, ORALLY DISINTEGRATING ORAL EVERY 4 HOURS PRN
Status: DISCONTINUED | OUTPATIENT
Start: 2024-03-15 | End: 2024-03-16 | Stop reason: HOSPADM

## 2024-03-15 RX ORDER — AMOXICILLIN 250 MG
2 CAPSULE ORAL EVERY EVENING
Status: DISCONTINUED | OUTPATIENT
Start: 2024-03-15 | End: 2024-03-16

## 2024-03-15 RX ORDER — TELMISARTAN AND HYDROCHLORTHIAZIDE 80; 12.5 MG/1; MG/1
1 TABLET ORAL DAILY
Status: DISCONTINUED | OUTPATIENT
Start: 2024-03-15 | End: 2024-03-15

## 2024-03-15 RX ORDER — IPRATROPIUM BROMIDE AND ALBUTEROL SULFATE 2.5; .5 MG/3ML; MG/3ML
3 SOLUTION RESPIRATORY (INHALATION) ONCE
Status: COMPLETED | OUTPATIENT
Start: 2024-03-15 | End: 2024-03-15

## 2024-03-15 RX ORDER — ACETAMINOPHEN 325 MG/1
650 TABLET ORAL EVERY 6 HOURS PRN
Status: DISCONTINUED | OUTPATIENT
Start: 2024-03-15 | End: 2024-03-16

## 2024-03-15 RX ORDER — ASPIRIN 81 MG/1
81 TABLET ORAL DAILY
COMMUNITY

## 2024-03-15 RX ADMIN — LABETALOL HYDROCHLORIDE 10 MG: 5 INJECTION INTRAVENOUS at 21:45

## 2024-03-15 RX ADMIN — BENZONATATE 100 MG: 100 CAPSULE ORAL at 21:43

## 2024-03-15 RX ADMIN — IPRATROPIUM BROMIDE AND ALBUTEROL SULFATE 3 ML: 2.5; .5 SOLUTION RESPIRATORY (INHALATION) at 13:41

## 2024-03-15 RX ADMIN — METHYLPREDNISOLONE SODIUM SUCCINATE 40 MG: 40 INJECTION, POWDER, FOR SOLUTION INTRAMUSCULAR; INTRAVENOUS at 21:37

## 2024-03-15 RX ADMIN — IPRATROPIUM BROMIDE 0.5 MG: 0.5 SOLUTION RESPIRATORY (INHALATION) at 15:53

## 2024-03-15 RX ADMIN — IBUPROFEN 800 MG: 800 TABLET, FILM COATED ORAL at 22:20

## 2024-03-15 RX ADMIN — MONTELUKAST 10 MG: 10 TABLET, FILM COATED ORAL at 21:38

## 2024-03-15 RX ADMIN — Medication 15 MG: at 15:52

## 2024-03-15 RX ADMIN — PREDNISONE 60 MG: 20 TABLET ORAL at 15:50

## 2024-03-15 RX ADMIN — SODIUM CHLORIDE, POTASSIUM CHLORIDE, SODIUM LACTATE AND CALCIUM CHLORIDE 1000 ML: 600; 310; 30; 20 INJECTION, SOLUTION INTRAVENOUS at 21:29

## 2024-03-15 RX ADMIN — MAGNESIUM SULFATE HEPTAHYDRATE 2 G: 2 INJECTION, SOLUTION INTRAVENOUS at 21:34

## 2024-03-15 RX ADMIN — IPRATROPIUM BROMIDE AND ALBUTEROL SULFATE 3 ML: 2.5; .5 SOLUTION RESPIRATORY (INHALATION) at 23:19

## 2024-03-15 RX ADMIN — OMEPRAZOLE 20 MG: 20 CAPSULE, DELAYED RELEASE ORAL at 18:19

## 2024-03-15 ASSESSMENT — COGNITIVE AND FUNCTIONAL STATUS - GENERAL
SUGGESTED CMS G CODE MODIFIER MOBILITY: CI
SUGGESTED CMS G CODE MODIFIER DAILY ACTIVITY: CH
WALKING IN HOSPITAL ROOM: A LITTLE
MOBILITY SCORE: 23
DAILY ACTIVITIY SCORE: 24

## 2024-03-15 ASSESSMENT — LIFESTYLE VARIABLES
AVERAGE NUMBER OF DAYS PER WEEK YOU HAVE A DRINK CONTAINING ALCOHOL: 0
ALCOHOL_USE: NO
SUBSTANCE_ABUSE: 0
HOW MANY TIMES IN THE PAST YEAR HAVE YOU HAD 5 OR MORE DRINKS IN A DAY: 0
EVER FELT BAD OR GUILTY ABOUT YOUR DRINKING: NO
TOTAL SCORE: 0
TOTAL SCORE: 0
CONSUMPTION TOTAL: NEGATIVE
TOTAL SCORE: 0
HAVE YOU EVER FELT YOU SHOULD CUT DOWN ON YOUR DRINKING: NO
HAVE PEOPLE ANNOYED YOU BY CRITICIZING YOUR DRINKING: NO
EVER HAD A DRINK FIRST THING IN THE MORNING TO STEADY YOUR NERVES TO GET RID OF A HANGOVER: NO
ON A TYPICAL DAY WHEN YOU DRINK ALCOHOL HOW MANY DRINKS DO YOU HAVE: 0
DOES PATIENT WANT TO STOP DRINKING: NO

## 2024-03-15 ASSESSMENT — COPD QUESTIONNAIRES
DO YOU EVER COUGH UP ANY MUCUS OR PHLEGM?: NO/ONLY WITH OCCASIONAL COLDS OR INFECTIONS
DURING THE PAST 4 WEEKS HOW MUCH DID YOU FEEL SHORT OF BREATH: SOME OF THE TIME
COPD SCREENING SCORE: 4
HAVE YOU SMOKED AT LEAST 100 CIGARETTES IN YOUR ENTIRE LIFE: YES

## 2024-03-15 ASSESSMENT — ENCOUNTER SYMPTOMS
DIZZINESS: 0
DEPRESSION: 0
SHORTNESS OF BREATH: 1
NAUSEA: 0
HEADACHES: 0
HEMOPTYSIS: 0
HEARTBURN: 1
MYALGIAS: 0
WHEEZING: 1
VOMITING: 0
COUGH: 1
ABDOMINAL PAIN: 0
SPUTUM PRODUCTION: 0
WEAKNESS: 1
CHILLS: 0
COUGH: 1
BLURRED VISION: 0
DOUBLE VISION: 0
BRUISES/BLEEDS EASILY: 0
FEVER: 0

## 2024-03-15 ASSESSMENT — FIBROSIS 4 INDEX
FIB4 SCORE: 0.89
FIB4 SCORE: 1.149977816999891844
FIB4 SCORE: 0.89

## 2024-03-15 ASSESSMENT — PAIN DESCRIPTION - PAIN TYPE
TYPE: ACUTE PAIN

## 2024-03-15 ASSESSMENT — PATIENT HEALTH QUESTIONNAIRE - PHQ9
1. LITTLE INTEREST OR PLEASURE IN DOING THINGS: NOT AT ALL
SUM OF ALL RESPONSES TO PHQ9 QUESTIONS 1 AND 2: 0
2. FEELING DOWN, DEPRESSED, IRRITABLE, OR HOPELESS: NOT AT ALL

## 2024-03-15 ASSESSMENT — PAIN SCALES - GENERAL: PAINLEVEL: NO PAIN

## 2024-03-15 NOTE — ED PROVIDER NOTES
ED Provider Note    Scribed for Mainor Duarte by Bereket Andrade. 3/15/2024  3:31 PM    Primary care provider: Jose Miguel Chaudhari P.A.-C.  Means of arrival: walk in  History obtained from: Patient  History limited by: None    CHIEF COMPLAINT  Chief Complaint   Patient presents with    Shortness of Breath     Patient report starting on Tuesday increasing chest congestion, SOB, and productive cough. Home medication has not been effective. Seen at local urgent care and received breathing treatment which she states has not been effective.      EXTERNAL RECORDS REVIEWED  Outpatient Notes Seen at urgent care earlier today for same symptoms, sent here    HPI/MAKI    LIMITATION TO HISTORY   Select: : None    HPI  Sharmin Cardozo is a 58 y.o. female who presents to the Emergency Department for worsening shortness of breath onset 3 days ago. Since Tuesday she has been having shortness of breath, she tried taking her medicines but it did not help. She was seen at urgent care for this earlier today where she had x-rays and DuoNeb which did not alleviate her symptoms and they sent her here. She denies any nausea, vomiting, diarrhea or abdominal pain. She is having productive cough, yellowish-greenish sputum. Last night she had a fever of 101. She denies any body aches. She does have a history of pneumonia. She has been smoking. She has a history of asthma.     REVIEW OF SYSTEMS  As above, all other systems reviewed and are negative.   See HPI for further details.     PAST MEDICAL HISTORY   has a past medical history of ASTHMA, Hypertension, and Physiological ovarian cysts.  SURGICAL HISTORY   has a past surgical history that includes vag deliv only,prev c-sectn; tubal ligation; cystoscopy (3/24/2009); cholecystectomy; vaginal hysterectomy scope total (3/24/2009); compl.ulnar nerve transposition w/ poss autograft (2003); mass excision general (Left, 2003); cholecystectomy; and total knee arthroplasty (Left,  10/20/2022).  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, 2014 ( had DUI and skilled nursing)    Drug use: No      Social History     Substance and Sexual Activity   Drug Use No     FAMILY HISTORY  Family History   Problem Relation Age of Onset    Heart Disease Father      CURRENT MEDICATIONS  Home Medications       Reviewed by Gladis Barrera (Pharmacy Tech) on 03/15/24 at 1728  Med List Status: Complete     Medication Last Dose Status   albuterol 108 (90 Base) MCG/ACT Aero Soln inhalation aerosol PRN Active   aspirin 81 MG EC tablet 3/15/2024 Active   ibuprofen (MOTRIN) 800 MG Tab ABOUT 1 MONTH AGO Active   lisinopril (PRINIVIL) 20 MG Tab 3/15/2024 Active   metoprolol SR (TOPROL XL) 50 MG TABLET SR 24 HR 3/15/2024 Active   Multiple Vitamin (DAILY VITAMIN PO) 3/15/2024 Active   Phenyleph-Triprolidine-DM-APAP (MUCINEX NIGHT SEV COLD/FLU MAX) 10-2.5- MG/20ML Solution 3/15/2024 Active   Phenylephrine-DM-GG-APAP (MUCINEX FAST-MAX COLD FLU) 5--325 MG/10ML Liquid 3/15/2024 Active                  ALLERGIES  Allergies   Allergen Reactions    Tetanus Toxoid Rash and Swelling     Rash & swelling at injection site from tetanus vaccine     Bactrim [Sulfamethoxazole W-Trimethoprim] Vomiting and Nausea    Iodine Vomiting and Nausea    Sulfa Drugs Vomiting and Nausea       PHYSICAL EXAM    VITAL SIGNS:   Vitals:    03/15/24 1600 03/15/24 1651 03/15/24 1655 03/15/24 1658   BP: (!) 161/102  (!) 154/88 (!) 154/88   Pulse: 76 95 (!) 113 97   Resp: (!) 75 (!) 24 (!) 26 (!) 21   Temp:       TempSrc:       SpO2: 97% 88% (!) 86% 94%   Weight:       Height:         Vitals: My  interpretation: hypertensive, not tachycardic, afebrile, not hypoxic    Reinterpretation of vitals: Improving    Cardiac Monitor Interpretation: The cardiac monitor revealed normal Sinus Rhythm as interpreted by me. The cardiac monitor was ordered secondary to the patient's history of shortness of breath and to monitor for dysrhythmia and/or tachycardia.    PE:   Gen: sitting comfortably, speaking clearly, appears in no acute distress. Obese  ENT: Mucous membranes moist, posterior pharynx clear, uvula midline, nares patent bilaterally   Neck: Supple, FROM  Pulmonary: Expiratory wheezes in all fields, no respiratory distress. No tachypnea  CV:  RRR, no murmur appreciated, pulses 2+ in both upper and lower extremities  Abdomen: soft, NT/ND; no rebound/guarding  : no CVA or suprapubic tenderness   Neuro: A&Ox4 (person, place, time, situation), speech fluent, gait steady, no focal deficits appreciated  Skin: No rash or lesions.  No pallor or jaundice.  No cyanosis.  Warm and dry.     DIAGNOSTIC STUDIES / PROCEDURES    LABS  Results for orders placed or performed during the hospital encounter of 03/15/24   Comp Metabolic Panel   Result Value Ref Range    Sodium 141 135 - 145 mmol/L    Potassium 4.2 3.6 - 5.5 mmol/L    Chloride 103 96 - 112 mmol/L    Co2 22 20 - 33 mmol/L    Anion Gap 16.0 7.0 - 16.0    Glucose 110 (H) 65 - 99 mg/dL    Bun 11 8 - 22 mg/dL    Creatinine 0.60 0.50 - 1.40 mg/dL    Calcium 9.6 8.5 - 10.5 mg/dL    Correct Calcium 9.2 8.5 - 10.5 mg/dL    AST(SGOT) 18 12 - 45 U/L    ALT(SGPT) 20 2 - 50 U/L    Alkaline Phosphatase 108 (H) 30 - 99 U/L    Total Bilirubin 0.3 0.1 - 1.5 mg/dL    Albumin 4.5 3.2 - 4.9 g/dL    Total Protein 7.7 6.0 - 8.2 g/dL    Globulin 3.2 1.9 - 3.5 g/dL    A-G Ratio 1.4 g/dL   ESTIMATED GFR   Result Value Ref Range    GFR (CKD-EPI) 105 >60 mL/min/1.73 m 2   CBC WITH DIFFERENTIAL   Result Value Ref Range    WBC 9.3 4.8 - 10.8 K/uL    RBC 5.86 (H) 4.20 - 5.40 M/uL    Hemoglobin 18.1  (H) 12.0 - 16.0 g/dL    Hematocrit 53.8 (H) 37.0 - 47.0 %    MCV 91.8 81.4 - 97.8 fL    MCH 30.9 27.0 - 33.0 pg    MCHC 33.6 32.2 - 35.5 g/dL    RDW 45.6 35.9 - 50.0 fL    Platelet Count 196 164 - 446 K/uL    MPV 9.9 9.0 - 12.9 fL    Neutrophils-Polys 60.60 44.00 - 72.00 %    Lymphocytes 32.30 22.00 - 41.00 %    Monocytes 5.70 0.00 - 13.40 %    Eosinophils 0.70 0.00 - 6.90 %    Basophils 0.40 0.00 - 1.80 %    Immature Granulocytes 0.30 0.00 - 0.90 %    Nucleated RBC 0.00 0.00 - 0.20 /100 WBC    Neutrophils (Absolute) 5.65 1.82 - 7.42 K/uL    Lymphs (Absolute) 3.02 1.00 - 4.80 K/uL    Monos (Absolute) 0.53 0.00 - 0.85 K/uL    Eos (Absolute) 0.07 0.00 - 0.51 K/uL    Baso (Absolute) 0.04 0.00 - 0.12 K/uL    Immature Granulocytes (abs) 0.03 0.00 - 0.11 K/uL    NRBC (Absolute) 0.00 K/uL   PLATELET COUNT, CITRATED   Result Value Ref Range    Platelet Count, Citrated 156 (L) 164 - 446 K/uL   EKG   Result Value Ref Range    Report       [facility] Emergency Dept.    Test Date:  2024-03-15  Pt Name:    ERENDIRA MARRERO                  Department: ER  MRN:        [id number]                      Room:  Gender:     Female                       Technician: [id number]  :        [id number]                   Requested By:ER TRIAGE PROTOCOL  Order #:    [id number]                    Reading MD: Mainor Duarte    Measurements  Intervals                                Axis  Rate:       81                           P:          71  KY:         146                          QRS:        -81  QRSD:       103                          T:          49  QT:         389  QTc:        452    Interpretive Statements  Sinus rhythm  Probable left atrial enlargement  Left anterior fascicular block  Probable anteroseptal infarct, old  Borderline ST depression, anterolateral leads  Baseline wander in lead(s) II,III,aVF,V2,V5,V6  Compared to ECG 2020 14:35:10  Left anterior fascicular block now present  Myocardial infarct finding  now  present  ST (T wave) deviation now present  Sinus bradycardia no longer present  Electronically Signed On 03- 15:42:09 PDT by Mainor Duarte     POC CoV-2, FLU A/B, RSV by PCR   Result Value Ref Range    POC Influenza A RNA, PCR Negative Negative    POC Influenza B RNA, PCR Negative Negative    POC RSV, by PCR Negative Negative    POC SARS-CoV-2, PCR NotDetected       All labs reviewed by me. Labs were compared to prior labs if they were available. Significant for normal electrolytes, normal renal function, normal liver enzymes, normal bilirubin, no leukocytosis, no anemia, flu, COVID and RSV negative.    RADIOLOGY  I have independently interpreted the diagnostic imaging associated with this visit and am waiting the final reading from the radiologist.   My preliminary interpretation is a follows: On my independent interpretation patient has no new significant cardiomegaly or focal consolidative process.     Radiologist interpretation is as follows:  DX-CHEST-PORTABLE (1 VIEW)   Final Result      No acute cardiac or pulmonary abnormalities are identified.      EC-ECHOCARDIOGRAM COMPLETE W/O CONT    (Results Pending)     COURSE & MEDICAL DECISION MAKING  Nursing notes, VS, PMSFHx, labs, imaging, EKG reviewed in chart.    ED Observation Status? No; Patient does not meet criteria for ED Observation.     Ddx: Flu, COVID, RSV, pneumonia, PE, MI, asthma exacerbation, COPD    MDM: 3:31 PM Sharmin Cardozo is a 58 y.o. female who presented with acute and severe ongoing shortness of breath, productive cough of yellow/green sputum.  History of heavy tobacco abuse, asthma, hypertension.  Denies general flulike symptoms like body aches, nasal congestion but did have a fever she states to 101 Fahrenheit last evening.  She arrives here extremely hypertensive from urgent care who sent her here for further evaluation.  Did give a breathing treatment there without improvement, no steroids were administered.  She arrives  "here with normal vital signs other than hypertension and mild tachypnea.  She is not hypoxic.  On exam patient has mild to moderate expiratory wheezing in all fields but no respiratory distress, she is morbidly obese but otherwise has a benign exam.  Initiated chest x-ray, EKG and laboratory workup including a viral panel for repeat testing.  Her chest x-ray on my independent interpretation shows no cardiomegaly or focal consolidative process.  Radiologist agrees.  EKG without ischemic changes.  All labs reviewed by me. Labs were compared to prior labs if they were available. Significant for normal electrolytes, normal renal function, normal liver enzymes, normal bilirubin, no leukocytosis, no anemia, flu, COVID and RSV negative.   I did ask the nursing staff to ambulate the patient after she received her hour-long breathing treatment which is her second breathing treatment today.  Unfortunately she failed ambulation, became very tachypneic, audible wheezing and oxygen dropped to 86%.  Paged hospitalist for admission for continued pulmonary toilet, treatment of her asthma exacerbation and monitoring labs.  Discussed with hospitalist and they are amenable.  Discussed with patient she is amenable.    Patient was counseled on her tobacco abuse and nicotine replacement patches were sent to the pharmacy in hopes that patient may be able to decrease her tobacco abuse and intake.  See procedure note.    Procedure Note:  Tobacco Cessation Counselling, Intermediate Timeframe, Greater than 4 minutes, less than 10 minutes:  Patient has been smoking for 40 years, averaging 1 packs per day.  Has not tried to stop in the past.  The patient knows that smoking is bad for general health and for the patient's disease process in particular.  I have recommended the use of a \"quit mariana\" to facilitate success.  I have also talked about resources with the American Cancer Society, the American Heart Association, as well as 1-800-QUIT-NOW. "  Finally, I briefly mentioned that pharmacologic adjuncts might be prescribed by a primary care physician, failing over-the-counter modalities.  The patient is given aftercare instructions on tobacco cessation.      ADDITIONAL PROBLEM LIST AND DISPOSITION    I have discussed management of the patient with the following physicians and DEEDEE's:  Hospitalist     Discussion of management with other QHP or appropriate source(s): RT regarding breathing treatments      Barriers to care at this time, including but not limited to: None    Decision tools and prescription drugs considered including, but not limited to:  None .    FINAL IMPRESSION  1. Severe persistent asthma with acute exacerbation Acute   2. SOB (shortness of breath) Acute   3. Productive cough Acute   4. Hypertension, unspecified type Acute   5. Tobacco abuse Acute   6. Tobacco abuse counseling Acute   7. Hypoxia Acute      Bereket DUPREE (Scribe), am scribing for, and in the presence of, Mainor Duarte.    Electronically signed by: Bereket Andrade (Belkys), 3/15/2024    IMainor personally performed the services described in this documentation, as scribed by Bereket Andrade in my presence, and it is both accurate and complete.    The note accurately reflects work and decisions made by me.  Mainor Duarte  3/15/2024  4:03 PM

## 2024-03-15 NOTE — FLOWSHEET NOTE
03/15/24 1559   Inhalation Therapy Treatment   Continuous Nebulizer Yes   $ Initial Continuous Nebulizer Set Up Yes

## 2024-03-15 NOTE — ED NOTES
Breathing tx completed  Ambulated pt to hallway with continuous spo2 , spo2  dropped 86% on room air with increase work of breathing and wheezing

## 2024-03-15 NOTE — ED TRIAGE NOTES
"Chief Complaint   Patient presents with    Shortness of Breath     Patient report starting on Tuesday increasing chest congestion, SOB, and productive cough. Home medication has not been effective. Seen at local urgent care and received breathing treatment which she states has not been effective.        Patient to triage ambulatory with a steady gait, AAOx4, Appropriate precautions in place. Mask applied.     Explained wait time and triage process. Placed back in hallway for lab draw. Told to notify ED tech or RN of any changes, verbalized understanding.    BP (!) 213/114   Pulse 87   Temp 36.3 °C (97.3 °F) (Temporal)   Resp 18   Ht 1.575 m (5' 2\")   Wt 121 kg (266 lb 1.5 oz)   LMP 04/17/2008   SpO2 94%   BMI 48.67 kg/m²    "

## 2024-03-15 NOTE — PROGRESS NOTES
Subjective:     Sharmin Cardozo is a 58 y.o. female who presents for Cough (Loss of voice x3 days)      Cough      Pt presents for evaluation of a new problem. Sharmin is a 58-year-old female presents to urgent care today with complaints of flulike symptoms, cough, congestion and severe shortness of breath x 3 days.  Her symptoms are severely worsening.  She states as though she feels there is an elephant sitting on her chest.  She is unable to take a full breath then.  She does suffer from asthma and is a current every day smoker.  She has been using her breathing treatments at home with no relief.  She denies any known ill contacts.    Review of Systems   Respiratory:  Positive for cough.        PMH:   Past Medical History:   Diagnosis Date    ASTHMA     Hypertension     Physiological ovarian cysts      ALLERGIES:   Allergies   Allergen Reactions    Bactrim Rash     Rxn - about 2016      Iodine Nausea    Sulfa Drugs     Tetanus Toxoid Rash and Swelling     Rash & swelling from tetanus vaccine      SURGHX:   Past Surgical History:   Procedure Laterality Date    PB TOTAL KNEE ARTHROPLASTY Left 10/20/2022    Procedure: LEFT TOTAL KNEE ARTHROPLASTY;  Surgeon: Sreedhar Young M.D.;  Location: [facility];  Service: Orthopedics    CYSTOSCOPY  3/24/2009    Performed [facility] ZORAN BRIGGS [facility]    VAGINAL HYSTERECTOMY SCOPE TOTAL  3/24/2009    Performed by ZORAN BRIGGS at SURGERY SAME DAY [facility] ORS    COMPL.ULNAR NERVE TRANSPOSITION W/ POSS AUTOGRAFT  2003    MASS EXCISION GENERAL Left 2003    left forearm - benign per patient    CHOLECYSTECTOMY      CHOLECYSTECTOMY      WA VAG DELIV ONLY,PREV C-SECTN      x 4    TUBAL LIGATION       SOCHX:   Social History     Socioeconomic History    Marital status:     Number of children: 4   Occupational History     Employer: [employer] [place] [id number]   Tobacco Use    Smoking status: Every Day     Current packs/day: 0.00      "Types: Cigarettes     Last attempt to quit: 2017     Years since quittin.1    Smokeless tobacco: Never    Tobacco comments:     3-4/day   Vaping Use    Vaping Use: Never used   Substance and Sexual Activity    Alcohol use: No     Alcohol/week: 0.0 oz     Comment: 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   Constitutional:       General: She is in acute distress.      Appearance: Normal appearance. She is ill-appearing.   HENT:      Head: Normocephalic and atraumatic.      Right Ear: Tympanic membrane, ear canal and external ear normal.      Left Ear: Tympanic membrane, ear canal and external ear normal.      Nose: No congestion or rhinorrhea.      Mouth/Throat:      Mouth: Mucous membranes are moist.      Pharynx: Posterior oropharyngeal erythema present. No oropharyngeal exudate.   Eyes:      Extraocular Movements: Extraocular movements intact.      Pupils: Pupils are equal, round, and reactive to light.   Cardiovascular:      Rate and Rhythm: Normal rate and regular rhythm.      Pulses: Normal pulses.      Heart sounds: Normal heart sounds.   Pulmonary:      Effort: Respiratory distress present.      Breath sounds: Examination of the right-upper field reveals wheezing and rhonchi. Examination of the left-upper field reveals wheezing and rhonchi. Examination of the right-middle field reveals wheezing and rhonchi. Examination of the left-middle field reveals wheezing and rhonchi. Examination of the right-lower field reveals wheezing and " rhonchi. Examination of the left-lower field reveals wheezing. Decreased breath sounds, wheezing and rhonchi present.      Comments: DuoNeb breathing treatment given in clinic today with no relief of shortness of breath.   Abdominal:      General: Abdomen is flat. Bowel sounds are normal.      Palpations: Abdomen is soft.      Tenderness: There is no abdominal tenderness. There is no right CVA tenderness or left CVA tenderness.   Musculoskeletal:         General: Normal range of motion.      Cervical back: Normal range of motion and neck supple.   Skin:     General: Skin is warm and dry.      Capillary Refill: Capillary refill takes less than 2 seconds.   Neurological:      General: No focal deficit present.      Mental Status: She is alert and oriented to person, place, and time. Mental status is at baseline.   Psychiatric:         Mood and Affect: Mood normal.         Behavior: Behavior normal.         Thought Content: Thought content normal.         Judgment: Judgment normal.       Results for orders placed or performed in visit on 03/15/24   POCT CoV-2, Flu A/B, RSV by PCR   Result Value Ref Range    SARS-CoV-2 by PCR Negative Negative, Invalid    Influenza virus A RNA Negative Negative, Invalid    Influenza virus B, PCR Negative Negative, Invalid    RSV, PCR Negative Negative, Invalid   POCT CEPHEID GROUP A STREP - PCR   Result Value Ref Range    POC Group A Strep, PCR Not Detected Not Detected, Invalid       Assessment/Plan:   Assessment    1. Shortness of breath  ipratropium-albuterol (DUONEB) nebulizer solution      2. Moderate persistent asthma with acute exacerbation  ipratropium-albuterol (DUONEB) nebulizer solution      3. Chest pain, unspecified type  ipratropium-albuterol (DUONEB) nebulizer solution      4. Flu-like symptoms  POCT CoV-2, Flu A/B, RSV by PCR    POCT CEPHEID GROUP A STREP - PCR      5. Borderline low O2 saturation          Due to patient's shortness of breath, low O2 saturation and  history of asthma she was referred to follow-up in emergency room for higher level of care.  I did encourage her to use designated  or EMS and she refused.  We also discussed steroid injection prior to her departure and patient would like to have this performed in emergency room.  Transfer center notified of patient's impending arrival.  AVS handout given and reviewed with patient. Pt educated on red flags and when to seek treatment back in ER or UC.

## 2024-03-16 VITALS
BODY MASS INDEX: 49.09 KG/M2 | HEART RATE: 86 BPM | SYSTOLIC BLOOD PRESSURE: 161 MMHG | HEIGHT: 62 IN | OXYGEN SATURATION: 92 % | WEIGHT: 266.76 LBS | DIASTOLIC BLOOD PRESSURE: 89 MMHG | RESPIRATION RATE: 17 BRPM | TEMPERATURE: 97.3 F

## 2024-03-16 LAB
ALBUMIN SERPL BCP-MCNC: 4.1 G/DL (ref 3.2–4.9)
APPEARANCE UR: CLEAR
BACTERIA #/AREA URNS HPF: NEGATIVE /HPF
BASOPHILS # BLD AUTO: 0.1 % (ref 0–1.8)
BASOPHILS # BLD: 0.01 K/UL (ref 0–0.12)
BILIRUB UR QL STRIP.AUTO: NEGATIVE
BUN SERPL-MCNC: 13 MG/DL (ref 8–22)
CALCIUM ALBUM COR SERPL-MCNC: 9.3 MG/DL (ref 8.5–10.5)
CALCIUM SERPL-MCNC: 9.4 MG/DL (ref 8.5–10.5)
CHLORIDE SERPL-SCNC: 101 MMOL/L (ref 96–112)
CO2 SERPL-SCNC: 18 MMOL/L (ref 20–33)
COLOR UR: YELLOW
CREAT SERPL-MCNC: 0.68 MG/DL (ref 0.5–1.4)
EOSINOPHIL # BLD AUTO: 0 K/UL (ref 0–0.51)
EOSINOPHIL NFR BLD: 0 % (ref 0–6.9)
EPI CELLS #/AREA URNS HPF: NEGATIVE /HPF
ERYTHROCYTE [DISTWIDTH] IN BLOOD BY AUTOMATED COUNT: 46.7 FL (ref 35.9–50)
GFR SERPLBLD CREATININE-BSD FMLA CKD-EPI: 102 ML/MIN/1.73 M 2
GLUCOSE SERPL-MCNC: 310 MG/DL (ref 65–99)
GLUCOSE UR STRIP.AUTO-MCNC: >=1000 MG/DL
HCT VFR BLD AUTO: 52.6 % (ref 37–47)
HGB BLD-MCNC: 17.8 G/DL (ref 12–16)
HYALINE CASTS #/AREA URNS LPF: NORMAL /LPF
IMM GRANULOCYTES # BLD AUTO: 0.02 K/UL (ref 0–0.11)
IMM GRANULOCYTES NFR BLD AUTO: 0.2 % (ref 0–0.9)
KETONES UR STRIP.AUTO-MCNC: ABNORMAL MG/DL
LEUKOCYTE ESTERASE UR QL STRIP.AUTO: NEGATIVE
LYMPHOCYTES # BLD AUTO: 1.6 K/UL (ref 1–4.8)
LYMPHOCYTES NFR BLD: 17.5 % (ref 22–41)
MAGNESIUM SERPL-MCNC: 2.5 MG/DL (ref 1.5–2.5)
MCH RBC QN AUTO: 31.8 PG (ref 27–33)
MCHC RBC AUTO-ENTMCNC: 33.8 G/DL (ref 32.2–35.5)
MCV RBC AUTO: 94.1 FL (ref 81.4–97.8)
MICRO URNS: ABNORMAL
MONOCYTES # BLD AUTO: 0.08 K/UL (ref 0–0.85)
MONOCYTES NFR BLD AUTO: 0.9 % (ref 0–13.4)
NEUTROPHILS # BLD AUTO: 7.43 K/UL (ref 1.82–7.42)
NEUTROPHILS NFR BLD: 81.3 % (ref 44–72)
NITRITE UR QL STRIP.AUTO: NEGATIVE
NRBC # BLD AUTO: 0 K/UL
NRBC BLD-RTO: 0 /100 WBC (ref 0–0.2)
NT-PROBNP SERPL IA-MCNC: 105 PG/ML (ref 0–125)
PH UR STRIP.AUTO: 5 [PH] (ref 5–8)
PHOSPHATE SERPL-MCNC: 2.2 MG/DL (ref 2.5–4.5)
PLATELET # BLD AUTO: 179 K/UL (ref 164–446)
PMV BLD AUTO: 10.1 FL (ref 9–12.9)
POTASSIUM SERPL-SCNC: 4.4 MMOL/L (ref 3.6–5.5)
PROT UR QL STRIP: 30 MG/DL
RBC # BLD AUTO: 5.59 M/UL (ref 4.2–5.4)
RBC # URNS HPF: NORMAL /HPF
RBC UR QL AUTO: NEGATIVE
SODIUM SERPL-SCNC: 136 MMOL/L (ref 135–145)
SP GR UR STRIP.AUTO: 1.04
UROBILINOGEN UR STRIP.AUTO-MCNC: 0.2 MG/DL
WBC # BLD AUTO: 9.1 K/UL (ref 4.8–10.8)
WBC #/AREA URNS HPF: NORMAL /HPF

## 2024-03-16 PROCEDURE — 81001 URINALYSIS AUTO W/SCOPE: CPT

## 2024-03-16 PROCEDURE — 94760 N-INVAS EAR/PLS OXIMETRY 1: CPT

## 2024-03-16 PROCEDURE — 94669 MECHANICAL CHEST WALL OSCILL: CPT

## 2024-03-16 PROCEDURE — 700101 HCHG RX REV CODE 250: Performed by: STUDENT IN AN ORGANIZED HEALTH CARE EDUCATION/TRAINING PROGRAM

## 2024-03-16 PROCEDURE — 94640 AIRWAY INHALATION TREATMENT: CPT

## 2024-03-16 PROCEDURE — 85025 COMPLETE CBC W/AUTO DIFF WBC: CPT

## 2024-03-16 PROCEDURE — 700111 HCHG RX REV CODE 636 W/ 250 OVERRIDE (IP): Mod: JZ | Performed by: STUDENT IN AN ORGANIZED HEALTH CARE EDUCATION/TRAINING PROGRAM

## 2024-03-16 PROCEDURE — 83880 ASSAY OF NATRIURETIC PEPTIDE: CPT

## 2024-03-16 PROCEDURE — A9270 NON-COVERED ITEM OR SERVICE: HCPCS | Performed by: STUDENT IN AN ORGANIZED HEALTH CARE EDUCATION/TRAINING PROGRAM

## 2024-03-16 PROCEDURE — 80069 RENAL FUNCTION PANEL: CPT

## 2024-03-16 PROCEDURE — 83735 ASSAY OF MAGNESIUM: CPT

## 2024-03-16 PROCEDURE — 700102 HCHG RX REV CODE 250 W/ 637 OVERRIDE(OP): Performed by: STUDENT IN AN ORGANIZED HEALTH CARE EDUCATION/TRAINING PROGRAM

## 2024-03-16 PROCEDURE — 36415 COLL VENOUS BLD VENIPUNCTURE: CPT

## 2024-03-16 PROCEDURE — 99239 HOSP IP/OBS DSCHRG MGMT >30: CPT | Performed by: STUDENT IN AN ORGANIZED HEALTH CARE EDUCATION/TRAINING PROGRAM

## 2024-03-16 RX ORDER — PREDNISONE 20 MG/1
40 TABLET ORAL DAILY
Qty: 6 TABLET | Refills: 0 | Status: SHIPPED | OUTPATIENT
Start: 2024-03-16 | End: 2024-03-19

## 2024-03-16 RX ORDER — GUAIFENESIN/DEXTROMETHORPHAN 100-10MG/5
10 SYRUP ORAL EVERY 6 HOURS PRN
Qty: 840 ML | Refills: 0 | Status: SHIPPED | OUTPATIENT
Start: 2024-03-16

## 2024-03-16 RX ORDER — ACETAMINOPHEN 500 MG
1000 TABLET ORAL 3 TIMES DAILY PRN
Status: DISCONTINUED | OUTPATIENT
Start: 2024-03-16 | End: 2024-03-16 | Stop reason: HOSPADM

## 2024-03-16 RX ORDER — BENZONATATE 100 MG/1
100 CAPSULE ORAL 3 TIMES DAILY PRN
Status: DISCONTINUED | OUTPATIENT
Start: 2024-03-16 | End: 2024-03-16 | Stop reason: HOSPADM

## 2024-03-16 RX ORDER — GUAIFENESIN/DEXTROMETHORPHAN 100-10MG/5
10 SYRUP ORAL EVERY 6 HOURS PRN
Status: DISCONTINUED | OUTPATIENT
Start: 2024-03-16 | End: 2024-03-16 | Stop reason: HOSPADM

## 2024-03-16 RX ORDER — BENZONATATE 100 MG/1
100 CAPSULE ORAL 3 TIMES DAILY PRN
Qty: 60 CAPSULE | Refills: 0 | Status: SHIPPED | OUTPATIENT
Start: 2024-03-16

## 2024-03-16 RX ORDER — LOSARTAN POTASSIUM 50 MG/1
50 TABLET ORAL DAILY
Qty: 30 TABLET | Refills: 0 | Status: SHIPPED | OUTPATIENT
Start: 2024-03-16

## 2024-03-16 RX ORDER — OMEPRAZOLE 20 MG/1
20 CAPSULE, DELAYED RELEASE ORAL 2 TIMES DAILY
Qty: 60 CAPSULE | Refills: 0 | Status: SHIPPED | OUTPATIENT
Start: 2024-03-16

## 2024-03-16 RX ORDER — GUAIFENESIN 600 MG/1
600 TABLET, EXTENDED RELEASE ORAL EVERY 6 HOURS PRN
Status: DISCONTINUED | OUTPATIENT
Start: 2024-03-16 | End: 2024-03-16

## 2024-03-16 RX ADMIN — IPRATROPIUM BROMIDE AND ALBUTEROL SULFATE 3 ML: 2.5; .5 SOLUTION RESPIRATORY (INHALATION) at 07:29

## 2024-03-16 RX ADMIN — MOMETASONE FUROATE AND FORMOTEROL FUMARATE DIHYDRATE 2 PUFF: 200; 5 AEROSOL RESPIRATORY (INHALATION) at 05:08

## 2024-03-16 RX ADMIN — HYDROCHLOROTHIAZIDE 12.5 MG: 25 TABLET ORAL at 05:06

## 2024-03-16 RX ADMIN — TIOTROPIUM BROMIDE INHALATION SPRAY 5 MCG: 3.12 SPRAY, METERED RESPIRATORY (INHALATION) at 02:05

## 2024-03-16 RX ADMIN — BENZONATATE 100 MG: 100 CAPSULE ORAL at 02:22

## 2024-03-16 RX ADMIN — METHYLPREDNISOLONE SODIUM SUCCINATE 40 MG: 40 INJECTION, POWDER, FOR SOLUTION INTRAMUSCULAR; INTRAVENOUS at 05:06

## 2024-03-16 RX ADMIN — METOPROLOL SUCCINATE 50 MG: 50 TABLET, EXTENDED RELEASE ORAL at 05:06

## 2024-03-16 RX ADMIN — IPRATROPIUM BROMIDE AND ALBUTEROL SULFATE 3 ML: 2.5; .5 SOLUTION RESPIRATORY (INHALATION) at 02:00

## 2024-03-16 RX ADMIN — TELMISARTAN 80 MG: 80 TABLET ORAL at 05:06

## 2024-03-16 ASSESSMENT — PAIN DESCRIPTION - PAIN TYPE: TYPE: ACUTE PAIN

## 2024-03-16 NOTE — ASSESSMENT & PLAN NOTE
Acute asthma/COPD exacerbation  Pulmonary hygiene, nebs, PEP  Dulera, Spiriva, Singulair  Wean off Solu-Medrol as tolerated  Smoking cessation advised

## 2024-03-16 NOTE — H&P
Hospital Medicine History & Physical Note    Date of Service  3/15/2024    Primary Care Physician  Jose Miguel Chaudhari P.A.-C.    Consultants  None    Code Status  Full Code    Chief Complaint  Chief Complaint   Patient presents with    Shortness of Breath     Patient report starting on Tuesday increasing chest congestion, SOB, and productive cough. Home medication has not been effective. Seen at local urgent care and received breathing treatment which she states has not been effective.        History of Presenting Illness  Sharmin Cardozo is a 58 y.o. female with history of hypertension, tobacco dependence, overlapping asthma/COPD who presented 3/15/2024 with evaluation for shortness of breath, cough, wheezing.  Patient reported not feeling well since Tuesday 3/12, reported persistent shortness of breath at rest and with exertion.  She has frequent dry cough.  Denies fever, chills.  In ER, no definitive focal consolidation noted on CXR.  She received multiple breathing treatments, as well as prednisone 60 mg in ER.  Reported to have been hypoxic with exertion or with minimal ambulation.  Due to persistent wheezing and concern for hypoxia, admission requested by ERP.  Admitted to medicine service for further evaluation and treatment.    I discussed the plan of care with patient, bedside RN, and pharmacy.    Review of Systems  Review of Systems   Constitutional:  Negative for chills and fever.   HENT:  Negative for hearing loss and tinnitus.    Eyes:  Negative for blurred vision and double vision.   Respiratory:  Positive for cough, shortness of breath and wheezing. Negative for hemoptysis and sputum production.    Gastrointestinal:  Positive for heartburn. Negative for abdominal pain, nausea and vomiting.   Genitourinary:  Negative for dysuria and urgency.   Musculoskeletal:  Negative for joint pain and myalgias.   Skin:  Negative for itching and rash.   Neurological:  Positive for weakness. Negative for dizziness  and headaches.   Endo/Heme/Allergies:  Negative for environmental allergies. Does not bruise/bleed easily.   Psychiatric/Behavioral:  Negative for depression and substance abuse.    All other systems reviewed and are negative.      Past Medical History   has a past medical history of ASTHMA, Hypertension, and Physiological ovarian cysts.    Surgical History   has a past surgical history that includes pr vag deliv only,prev c-sectn; tubal ligation; cystoscopy (3/24/2009); cholecystectomy; vaginal hysterectomy scope total (3/24/2009); compl.ulnar nerve transposition w/ poss autograft (2003); mass excision general (Left, 2003); cholecystectomy; and pr total knee arthroplasty (Left, 10/20/2022).     Family History  family history includes Heart Disease in her father.   Family history reviewed with patient. There is family history that is pertinent to the chief complaint.     Social History   reports that she has been smoking cigarettes. She has never used smokeless tobacco. She reports that she does not drink alcohol and does not use drugs.    Allergies  Allergies   Allergen Reactions    Tetanus Toxoid Rash and Swelling     Rash & swelling at injection site from tetanus vaccine     Bactrim [Sulfamethoxazole W-Trimethoprim] Vomiting and Nausea    Iodine Vomiting and Nausea    Sulfa Drugs Vomiting and Nausea       Medications  Prior to Admission Medications   Prescriptions Last Dose Informant Patient Reported? Taking?   Multiple Vitamin (DAILY VITAMIN PO)  Patient Yes No   Sig: Take 1 Tab by mouth every day.   albuterol 108 (90 Base) MCG/ACT Aero Soln inhalation aerosol  Patient No No   Sig: Inhale 2 Puffs by mouth every 6 hours as needed for Shortness of Breath.   Patient not taking: Reported on 3/15/2024   amLODIPine (NORVASC) 10 MG Tab   No No   Sig: Take 1 tablet by mouth once daily   Patient not taking: Reported on 3/15/2024   cyclobenzaprine (FLEXERIL) 10 mg Tab   Yes No   Sig: TAKE 1 TABLET BY MOUTH ONCE DAILY AT  BEDTIME AS NEEDED   Patient not taking: Reported on 3/15/2024   metoprolol SR (TOPROL XL) 25 MG TABLET SR 24 HR   No No   Sig: Take 1 tablet by mouth once daily   telmisartan-hydrochlorothiazide (MICARDIS HCT) 80-12.5 MG per tablet   No No   Sig: Take 1 tablet by mouth once daily      Facility-Administered Medications: None       Physical Exam  Temp:  [36.2 °C (97.1 °F)-36.3 °C (97.3 °F)] 36.3 °C (97.3 °F)  Pulse:  [] 97  Resp:  [16-75] 21  BP: (146-213)/() 154/88  SpO2:  [86 %-97 %] 94 %  Blood Pressure: (!) 154/88   Temperature: 36.3 °C (97.3 °F)   Pulse: 97   Respiration: (!) 21   Pulse Oximetry: 94 %       Physical Exam  Vitals and nursing note reviewed.   Constitutional:       General: She is not in acute distress.     Appearance: She is obese.   HENT:      Head: Normocephalic and atraumatic.      Nose: Nose normal.      Mouth/Throat:      Mouth: Mucous membranes are moist.      Pharynx: Oropharynx is clear.   Eyes:      General: No scleral icterus.     Extraocular Movements: Extraocular movements intact.   Cardiovascular:      Rate and Rhythm: Regular rhythm. Tachycardia present.      Pulses: Normal pulses.      Heart sounds:      No friction rub.   Pulmonary:      Effort: No respiratory distress.      Breath sounds: No stridor. Wheezing (end expiratory) and rales present.   Chest:      Chest wall: No tenderness.   Abdominal:      General: There is no distension.      Tenderness: There is no abdominal tenderness. There is no guarding or rebound.   Musculoskeletal:         General: Normal range of motion.      Cervical back: Neck supple. No tenderness.      Comments: Trace LE edema   Skin:     General: Skin is warm and dry.      Capillary Refill: Capillary refill takes less than 2 seconds.   Neurological:      General: No focal deficit present.      Mental Status: She is alert and oriented to person, place, and time.      Sensory: No sensory deficit.   Psychiatric:         Mood and Affect: Mood  "normal.         Laboratory:  Recent Labs     03/15/24  1715   WBC 9.3   RBC 5.86*   HEMOGLOBIN 18.1*   HEMATOCRIT 53.8*   MCV 91.8   MCH 30.9   MCHC 33.6   RDW 45.6   PLATELETCT 196   MPV 9.9     Recent Labs     03/15/24  1503   SODIUM 141   POTASSIUM 4.2   CHLORIDE 103   CO2 22   GLUCOSE 110*   BUN 11   CREATININE 0.60   CALCIUM 9.6     Recent Labs     03/15/24  1503   ALTSGPT 20   ASTSGOT 18   ALKPHOSPHAT 108*   TBILIRUBIN 0.3   GLUCOSE 110*         No results for input(s): \"NTPROBNP\" in the last 72 hours.      No results for input(s): \"TROPONINT\" in the last 72 hours.    Imaging:  DX-CHEST-PORTABLE (1 VIEW)   Final Result      No acute cardiac or pulmonary abnormalities are identified.      EC-ECHOCARDIOGRAM COMPLETE W/O CONT    (Results Pending)       X-Ray:  I have personally reviewed the images and compared with prior images.  EKG:  I have personally reviewed the images and compared with prior images.    Assessment/Plan:  Justification for Admission Status  I anticipate this patient will require at least 2 midnights hospitalization, therefore appropriate for inpatient status.        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  She expressed understanding. Offered nicotine patch, nicotine gum, Chantix as alternative -- she declined.  Provided patient " standard tobacco cessation information per protocol    Essential hypertension- (present on admission)  Assessment & Plan  Continue home telmisartan, hydrochlorothiazide, metoprolol    Class 3 severe obesity in adult (HCC)- (present on admission)  Assessment & Plan  Diet and lifestyle modification  Body mass index is 48.67 kg/m².          VTE prophylaxis: heparin ppx

## 2024-03-16 NOTE — CARE PLAN
The patient is Stable - Low risk of patient condition declining or worsening    Shift Goals  Clinical Goals: Pt will report a decrease in coughing frequency by end of shift  Patient Goals: comfort  Family Goals: FRANCOISE    Progress made toward(s) clinical / shift goals:  Pt has been coughing less throughout shift after being medicated per MAR and receiving breathing treatment    Patient is not progressing towards the following goals:

## 2024-03-16 NOTE — CARE PLAN
The patient is Stable - Low risk of patient condition declining or worsening    Shift Goals  Clinical Goals: Pt will report a decrease in coughing frequency by end of shift  Patient Goals: comfort  Family Goals: FRANCOISE    Progress made toward(s) clinical / shift goals:  met     Problem: Knowledge Deficit - Standard  Goal: Patient and family/care givers will demonstrate understanding of plan of care, disease process/condition, diagnostic tests and medications  Outcome: Met     Problem: Knowledge Deficit - COPD  Goal: Patient/significant other demonstrates understanding of disease process, utilization of the Action Plan, medications and discharge instruction  Outcome: Met     Problem: Risk for Infection - COPD  Goal: Patient will remain free from signs and symptoms of infection  Outcome: Met     Problem: Nutrition - Advanced  Goal: Patient will display progressive weight gain toward goal have adequate food and fluid intake  Outcome: Met     Problem: Ineffective Airway Clearance  Goal: Patient will maintain patent airway with clear/clearing breath sounds  Outcome: Met     Problem: Impaired Gas Exchange  Goal: Patient will demonstrate improved ventilation and adequate oxygenation and participate in treatment regimen within the level of ability/situation.  Outcome: Met     Problem: Risk for Aspiration  Goal: Patient's risk for aspiration will be absent or decrease  Outcome: Met     Problem: Self Care  Goal: Patient will have the ability to perform ADLs independently or with assistance (bathe, groom, dress, toilet and feed)  Outcome: Met     Problem: Pain - Standard  Goal: Alleviation of pain or a reduction in pain to the patient’s comfort goal  Outcome: Met       Patient is not progressing towards the following goals:

## 2024-03-16 NOTE — ASSESSMENT & PLAN NOTE
Admits to heavy smoking in the past, currently down to less than 5 cigarette smoking daily  Smoking cessation counseling provided: 4 minutes  We discussed tobacco cessation given her long history of COPD/asthma, as well as over cardiovascular health benefits.  She expressed understanding. Offered nicotine patch, nicotine gum, Chantix as alternative -- she declined.  Provided patient standard tobacco cessation information per protocol

## 2024-03-16 NOTE — ED NOTES
Med rec completed per patient at bedside.  Allergies reviewed with patient.  Patient's preferred pharmacy: [facility] on [address].    Outpatient antibiotics within the last 30 days: NONE.    ANTICOAGULATION: NONE.

## 2024-03-16 NOTE — CARE PLAN
Problem: Bronchoconstriction  Goal: Improve in air movement and diminished wheezing  Description: Target End Date:  2 to 3 days    1.  Implement inhaled treatments  2.  Evaluate and manage medication effects  Outcome: Progressing    Duoneb - Q4     Problem: Bronchopulmonary Hygiene  Goal: Increase mobilization of retained secretions  Description: Target End Date:  2 to 3 days    1.  Perform bronchopulmonary therapy as indicated by assessment  2.  Perform airway suctioning  3.  Perform actions to maintain patient airway  Outcome: Progressing     Jimier - QID

## 2024-03-16 NOTE — DISCHARGE SUMMARY
Discharge Summary    CHIEF COMPLAINT ON ADMISSION  Chief Complaint   Patient presents with    Shortness of Breath     Patient report starting on Tuesday increasing chest congestion, SOB, and productive cough. Home medication has not been effective. Seen at local urgent care and received breathing treatment which she states has not been effective.        Reason for Admission  Sent in by .     Admission Date  3/15/2024    CODE STATUS  Full Code    HPI & HOSPITAL COURSE  58 y.o. female with history of hypertension, tobacco dependence, overlapping asthma/COPD who presented 3/15/2024 with evaluation for shortness of breath, cough, wheezing.  Patient reported not feeling well since Tuesday 3/12, reported persistent shortness of breath at rest and with exertion.  She has frequent dry cough.  Denies fever, chills.  In ER, no definitive focal consolidation noted on CXR.  She received multiple breathing treatments, as well as prednisone 60 mg in ER.  Reported to have been hypoxic with exertion or with minimal ambulation.  Due to persistent wheezing and concern for hypoxia, admission requested by ERP.  Admitted to medicine service for further evaluation and treatment.     Patient was admitted to the medical floor and started on schedule DuoNebs, Spiriva, Singulair and IV steroids.  Patient had marked improvement with dyspnea shortly after admission.  She was transition to an oral steroid regimen.  A1c at 6.8 for which patient was diagnosed with type 2 diabetes and started on metformin in the outpatient setting.  Patient tolerating meals and ambulating without difficulties.  Stable patient with in chronic condition was discharged home on albuterol, Spiriva, Singulair and oral steroids.  Patient was instructed to follow-up with her primary care provider within a week to pursue pulmonary function testing and adjust asthma/COPD medical management.  All results and plan of action discussed with the patient for she voiced  understanding and agreement with the primary care team.  Patient was instructed to return to emergency department if symptoms were to worsen.    Therefore, she is discharged in good and stable condition to home with close outpatient follow-up.    The patient recovered much more quickly than anticipated on admission.    Discharge Date  3/16/2024    FOLLOW UP ITEMS POST DISCHARGE  Primary care provider follow posthospital discharge care  Primary care provider to consider pursuing pulmonary function testing    DISCHARGE DIAGNOSES  Principal Problem:    Acute asthma exacerbation (POA: Yes)  Active Problems:    Essential hypertension (POA: Yes)      Overview: Diagnosed when she was around 22. Previous hospital visit she was       discharged on amlodipine 10 mg which she's been taking daily, recent       hospital visit she was started on carvedilol 6.25 mg bid and daily       aspirin.  Home readings done but she does not recall specific readings.       There has been fluctuation in the readings.       Managed with amlodipine 10 mg daily, hasn't been taking carvedilol since       shortly after discharge. No recent monitoring.     COPD with asthma (POA: Yes)      Overview: Symbicort 1 puff bid, will check label at home. Duoneb twice last month.       No recent albuterol use.                Class 3 severe obesity in adult (HCC) (POA: Yes)    Tobacco abuse (POA: Unknown)    Cough (POA: Unknown)  Resolved Problems:    * No resolved hospital problems. *      FOLLOW UP  No future appointments.  No follow-up provider specified.    MEDICATIONS ON DISCHARGE     Medication List        START taking these medications        Instructions   benzonatate 100 MG Caps  Commonly known as: Tessalon   Take 1 Capsule by mouth 3 times a day as needed for Cough (3rd line for cough).  Dose: 100 mg     guaiFENesin dextromethorphan 100-10 MG/5ML Syrp syrup  Commonly known as: Robitussin DM   Take 10 mL by mouth every 6 hours as needed for Cough (2nd  line for cough).  Dose: 10 mL     losartan 50 MG Tabs  Commonly known as: Cozaar   Take 1 Tablet by mouth every day.  Dose: 50 mg     metFORMIN 500 MG Tabs  Commonly known as: Glucophage   Take 1 Tablet by mouth 2 times a day with meals.  Dose: 500 mg     mometasone-formoterol 200-5 MCG/ACT Aero  Commonly known as: Dulera   Inhale 2 Puffs 2 times a day.  Dose: 2 Puff     nicotine 21 MG/24HR Pt24  Commonly known as: Nicoderm   Place 1 Patch on the skin every 24 hours.  Dose: 1 Patch     omeprazole 20 MG delayed-release capsule  Commonly known as: PriLOSEC   Take 1 Capsule by mouth 2 times a day.  Dose: 20 mg     predniSONE 20 MG Tabs  Commonly known as: Deltasone   Take 2 Tablets by mouth every day for 3 days.  Dose: 40 mg     tiotropium 2.5 mcg/Act Aers  Start taking on: March 17, 2024  Commonly known as: Spiriva Respimat   Inhale 2 Inhalations every day.  Dose: 5 mcg            CONTINUE taking these medications        Instructions   albuterol 108 (90 Base) MCG/ACT Aers inhalation aerosol   Inhale 2 Puffs by mouth every 6 hours as needed for Shortness of Breath.  Dose: 2 Puff     aspirin 81 MG EC tablet   Take 81 mg by mouth every day.  Dose: 81 mg     DAILY VITAMIN PO   Take 1 Tab by mouth every day.  Dose: 1 Tablet     metoprolol SR 50 MG Tb24  Commonly known as: Toprol XL   Take 50 mg by mouth every day.  Dose: 50 mg     Mucinex Fast-Max Cold Flu 5--325 MG/10ML Liqd  Generic drug: Phenylephrine-DM-GG-APAP   Take 20 mL by mouth every four hours as needed (Cough, Mild Pain or Fever).  Dose: 20 mL     Mucinex Night Sev Cold/Flu Max 10-2.5- MG/20ML Soln  Generic drug: Phenyleph-Triprolidine-DM-APAP   Take 20 mL by mouth at bedtime as needed (Cough, Mild Pain or Fever).  Dose: 20 mL            STOP taking these medications      ibuprofen 800 MG Tabs  Commonly known as: Motrin     lisinopril 20 MG Tabs  Commonly known as: Prinivil              Allergies  Allergies   Allergen Reactions    Tetanus Toxoid Rash  and Swelling     Rash & swelling at injection site from tetanus vaccine     Bactrim [Sulfamethoxazole W-Trimethoprim] Vomiting and Nausea    Iodine Vomiting and Nausea    Sulfa Drugs Vomiting and Nausea       DIET  Orders Placed This Encounter   Procedures    Diet Order Diet: Regular     Standing Status:   Standing     Number of Occurrences:   1     Order Specific Question:   Diet:     Answer:   Regular [1]       ACTIVITY  As tolerated.  Weight bearing as tolerated    CONSULTATIONS  None    PROCEDURES  None    LABORATORY  Lab Results   Component Value Date    SODIUM 136 03/16/2024    POTASSIUM 4.4 03/16/2024    CHLORIDE 101 03/16/2024    CO2 18 (L) 03/16/2024    GLUCOSE 310 (H) 03/16/2024    BUN 13 03/16/2024    CREATININE 0.68 03/16/2024    CREATININE 0.9 03/19/2009        Lab Results   Component Value Date    WBC 9.1 03/16/2024    HEMOGLOBIN 17.8 (H) 03/16/2024    HEMATOCRIT 52.6 (H) 03/16/2024    PLATELETCT 179 03/16/2024        Total time of the discharge process exceeds 31 minutes.

## 2024-03-16 NOTE — PROGRESS NOTES
4 Eyes Skin Assessment Completed by NESTOR Manriquez and Shea BAEZ.    Head WDL  Ears WDL  Nose WDL  Mouth WDL  Neck WDL  Breast/Chest WDL  Shoulder Blades WDL  Spine WDL  (R) Arm/Elbow/Hand WDL  (L) Arm/Elbow/Hand WDL  Abdomen WDL  Groin WDL  Scrotum/Coccyx/Buttocks WDL  (R) Leg WDL  (L) Leg Scar from knee surgery   (R) Heel/Foot/Toe WDL  (L) Heel/Foot/Toe WDL          Devices In Places n/a      Interventions In Place Pillows    Possible Skin Injury No    Pictures Uploaded Into Epic N/A  Wound Consult Placed N/A  RN Wound Prevention Protocol Ordered No

## 2024-03-16 NOTE — PROGRESS NOTES
Assumed care of patient at 1930. Received report from day shift RN. Pt is A&O x 4, on RA, shortness of breath present on ambulation. Reporting a pain level of 4, pt requested ibuprofen, provider notified and medicated pt per MAR. Bed is in lowest position, bed alarm on, and call light is within reach. Fall precautions are in place.

## 2024-03-16 NOTE — PROGRESS NOTES
Received report from NESTOR Chambers. Transferred from ED 1730. A&o x4, VSS on RA, denies pain at this time. Bed in lowest position, call bell in reach. Fall education given, pt up independently. SOB with ambulation.

## 2024-03-27 ENCOUNTER — TELEPHONE (OUTPATIENT)
Dept: VASCULAR LAB | Facility: MEDICAL CENTER | Age: 59
End: 2024-03-27
Payer: COMMERCIAL

## 2024-03-27 NOTE — TELEPHONE ENCOUNTER
[facility]     Received smoking cessation referral from Dr. Kamara on 3/15/24.     Called patient to schedule an appt to establish care. No answer. LVM.     Insurance: [employer]  PCP: External  Locations to be seen: [facility]     If no response by 4/15/24 (1 month from referral date) OR 2 no shows/cancellations, will remove from referral list and send FYI to referring provider    [facility] at [phone], fax [phone]    Jose Russo, KamlaD, BCACP

## 2024-04-03 ENCOUNTER — TELEPHONE (OUTPATIENT)
Dept: VASCULAR LAB | Facility: MEDICAL CENTER | Age: 59
End: 2024-04-03
Payer: COMMERCIAL

## 2024-04-03 NOTE — TELEPHONE ENCOUNTER
[facility]     Received smoking cessation referral from Dr. Kamara on 3/15/24.     Called patient to schedule an appt to establish care. No answer. LVM.    2nd call     Insurance: [employer]  PCP: External  Locations to be seen: [facility]     If no response by 4/15/24 (1 month from referral date) OR 2 no shows/cancellations, will remove from referral list and send FYI to referring provider     [facility] at [phone], fax [phone]     Jose Russo, PharmD, BCACP

## 2024-04-10 ENCOUNTER — TELEPHONE (OUTPATIENT)
Dept: VASCULAR LAB | Facility: MEDICAL CENTER | Age: 59
End: 2024-04-10
Payer: COMMERCIAL

## 2024-04-10 NOTE — LETTER
[id number] [facility] [id number]        Dear Sharmin Cardozo ,    We have been unsuccessful in our attempts to contact you regarding your Clinical Pharmacist referral.  Please contact us if you would like to schedule an appointment for help managing your tobacco cessation.    Please contact our clinic so we may assist you.  We are open Monday-Friday 8 am until 5 pm.  You may reach our Service at [phone].        Sincerely,    Randall Guerra, KamlaD, [facility]  Clinic Supervisor  [facility]  Outpatient Anticoagulation Service

## 2024-04-10 NOTE — TELEPHONE ENCOUNTER
[facility]     Received smoking cessation referral from Dr. Kamara on 3/15/24.     Called patient to schedule an appt to establish care. No answer. LVM.     3rd call    Sent letter.     Insurance: [employer]  PCP: External  Locations to be seen: [facility]     If no response by 4/15/24 (1 month from referral date) OR 2 no shows/cancellations, will remove from referral list and send FYI to referring provider     [facility] at [phone], fax [phone]     Jose Russo, PharmD, BCACP

## 2024-04-11 ENCOUNTER — TELEPHONE (OUTPATIENT)
Dept: HEALTH INFORMATION MANAGEMENT | Facility: OTHER | Age: 59
End: 2024-04-11

## 2024-05-03 ENCOUNTER — HOSPITAL ENCOUNTER (EMERGENCY)
Facility: MEDICAL CENTER | Age: 59
End: 2024-05-03
Attending: EMERGENCY MEDICINE
Payer: COMMERCIAL

## 2024-05-03 ENCOUNTER — APPOINTMENT (OUTPATIENT)
Dept: RADIOLOGY | Facility: MEDICAL CENTER | Age: 59
End: 2024-05-03
Attending: EMERGENCY MEDICINE
Payer: COMMERCIAL

## 2024-05-03 VITALS
DIASTOLIC BLOOD PRESSURE: 102 MMHG | BODY MASS INDEX: 49.49 KG/M2 | TEMPERATURE: 96.8 F | HEIGHT: 62 IN | WEIGHT: 268.96 LBS | OXYGEN SATURATION: 96 % | RESPIRATION RATE: 16 BRPM | HEART RATE: 88 BPM | SYSTOLIC BLOOD PRESSURE: 145 MMHG

## 2024-05-03 DIAGNOSIS — M54.12 CERVICAL RADICULOPATHY: ICD-10-CM

## 2024-05-03 ASSESSMENT — FIBROSIS 4 INDEX: FIB4 SCORE: 1.26

## 2024-05-03 NOTE — ED TRIAGE NOTES
"Pt amb to triage.    Chief Complaint   Patient presents with    Numbness     Left arm x4d    Skin Lesion     \"I have a few lumps on the back of my neck\"     Pt hypertensive in triage. Reports she is compliant w/ her meds but does admit she has felt tired for a few days and \"just not myself.\"   "

## 2024-05-03 NOTE — ED PROVIDER NOTES
"ED Provider Note    CHIEF COMPLAINT  Chief Complaint   Patient presents with    Numbness     Left arm x4d    Skin Lesion     \"I have a few lumps on the back of my neck\"       EXTERNAL RECORDS REVIEWED  Inpatient Notes recent hospitalization 3/15/2014 for COPD exacerbation with hypoxia.  Patient received steroids and albuterol.  Symptoms improved and patient was discharged.    HPI/ROS      Sharmin Cardozo is a 58 y.o. female who presents with left arm numbness.  Patient reports she has had multiple lipomas in the past that have been excised.  She states that she has some send her upper back, encroaching on her neck over the last few weeks.  She will get some numbness of her hand and arm that come and go.  She reports that this has progressively worsened over the last few weeks.  She denies any fevers or chills.  She does report some general malaise.  Patient denies any history of IVDU.  Patient denies any recent trauma.  She denies any significant neck pain.  Patient denies any associated headache.  She denies any difficulty speaking, changes in vision, lower extremity weakness or numbness or other complaints.    PAST MEDICAL HISTORY   has a past medical history of ASTHMA, Hypertension, and Physiological ovarian cysts.    SURGICAL HISTORY   has a past surgical history that includes vag deliv only,prev c-sectn; tubal ligation; cystoscopy (3/24/2009); cholecystectomy; vaginal hysterectomy scope total (3/24/2009); compl.ulnar nerve transposition w/ poss autograft (); mass excision general (Left, ); cholecystectomy; and total knee arthroplasty (Left, 10/20/2022).    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 (Registered Nurse) on 05/03/24 at 1306  Med List Status: Partial     Medication Last Dose Status   albuterol 108 (90 Base) MCG/ACT Aero Soln inhalation aerosol  Active   aspirin 81 MG EC tablet  Active   benzonatate (TESSALON) 100 MG Cap  Active   guaiFENesin dextromethorphan (ROBITUSSIN DM) 100-10 MG/5ML Syrup syrup  Active   losartan (COZAAR) 50 MG Tab  Active   metFORMIN (GLUCOPHAGE) 500 MG Tab  Active   metoprolol SR (TOPROL XL) 50 MG TABLET SR 24 HR  Active   mometasone-formoterol (DULERA) 200-5 MCG/ACT Aerosol  Active   Multiple Vitamin (DAILY VITAMIN PO)  Active   nicotine (NICODERM) 21 MG/24HR PATCH 24 HR  Active   omeprazole (PRILOSEC) 20 MG delayed-release capsule  Active   Phenyleph-Triprolidine-DM-APAP (MUCINEX NIGHT SEV COLD/FLU MAX) 10-2.5- MG/20ML Solution  Active   Phenylephrine-DM-GG-APAP (MUCINEX FAST-MAX COLD FLU) 5--325 MG/10ML Liquid  Active   tiotropium (SPIRIVA RESPIMAT) 2.5 mcg/Act Aero Soln  Active                    ALLERGIES  Allergies   Allergen Reactions    Tetanus Toxoid Rash and Swelling     Rash & swelling at injection site from tetanus vaccine     Bactrim [Sulfamethoxazole W-Trimethoprim] Vomiting and Nausea    Iodine Vomiting and Nausea    Sulfa Drugs Vomiting and Nausea       PHYSICAL EXAM  VITAL SIGNS: BP (!) 182/108   Pulse 96   Temp 35.9 °C (96.6 °F) (Temporal)   Resp 16   Ht 1.575 m (5' 2\")   Wt 122 kg (268 lb 15.4 oz)   LMP 04/17/2008   SpO2 96%   BMI 49.19 kg/m²    Physical Exam  HENT:      Head: Normocephalic and atraumatic.   Pulmonary:      Effort: Pulmonary effort is normal.   Musculoskeletal:      Comments: Cervical spine is clear " of any tenderness palpation or bony abnormality.  There are multiple small dime size freely mobile nontender masses in the subcutaneous tissue without any overlying skin changes.  Patient also with a few of these similar lesions on her upper arm.  Sensation is intact to both painful soft stimuli and strength is 5 out of 5 in distal and proximal musculature.   Neurological:      General: No focal deficit present.      Mental Status: She is alert.      Cranial Nerves: No cranial nerve deficit.      Motor: No weakness.      Gait: Gait normal.   Psychiatric:         Mood and Affect: Mood normal.         Radiologist interpretation:  DX-CERVICAL SPINE-2 OR 3 VIEWS   Final Result      Negative cervical spine series.      MR-CERVICAL SPINE-WITH & W/O    (Results Pending)         COURSE & MEDICAL DECISION MAKING    ASSESSMENT, COURSE AND PLAN  Care Narrative: Well-appearing patient here with multiple lipomas on her neck.  She has neurologic symptoms that are coming and going.  She does not have any associated fever.  Lesions do not appear consistent with abscess.  They are all nontender.  Will check x-ray to evaluate for possible bony abnormality.  Will order outpatient cervical spine MRI with and without contrast to help facilitate outpatient workup.  Patient to follow-up with spine.  Imaging is unremarkable.  Discharged home with supportive care.          DISPOSITION AND DISCUSSIONS      Escalation of care considered, and ultimately not performed: Nursing labs were deferred, patient very well-appearing, suspicion of infection is very low, emergent MRI was deferred in this very well-appearing patient, no immediate red flags for acute cord compression, patient with reassuring neurologic exam.        FINAL DIAGNOSIS  1. Cervical radiculopathy

## 2024-05-03 NOTE — DISCHARGE INSTRUCTIONS
I would like you to follow-up closely with your primary care physician.  I have ordered a outpatient MRI for your cervical spine please call to schedule an appointment for this and follow-up with your primary care physician regarding these results.  Have also given you a referral for neurosurgery for further evaluation.

## 2024-07-11 ENCOUNTER — APPOINTMENT (OUTPATIENT)
Dept: RADIOLOGY | Facility: MEDICAL CENTER | Age: 59
End: 2024-07-11
Attending: EMERGENCY MEDICINE
Payer: COMMERCIAL

## 2024-07-11 ENCOUNTER — HOSPITAL ENCOUNTER (EMERGENCY)
Facility: MEDICAL CENTER | Age: 59
End: 2024-07-11
Attending: EMERGENCY MEDICINE
Payer: COMMERCIAL

## 2024-07-11 VITALS
HEIGHT: 62 IN | BODY MASS INDEX: 48.4 KG/M2 | WEIGHT: 263 LBS | SYSTOLIC BLOOD PRESSURE: 167 MMHG | DIASTOLIC BLOOD PRESSURE: 84 MMHG | HEART RATE: 78 BPM | TEMPERATURE: 98 F | OXYGEN SATURATION: 94 % | RESPIRATION RATE: 16 BRPM

## 2024-07-11 DIAGNOSIS — M54.9 PAIN, UPPER BACK: ICD-10-CM

## 2024-07-11 DIAGNOSIS — I10 PRIMARY HYPERTENSION: ICD-10-CM

## 2024-07-11 DIAGNOSIS — M54.50 ACUTE MIDLINE LOW BACK PAIN WITHOUT SCIATICA: ICD-10-CM

## 2024-07-11 DIAGNOSIS — R07.9 CHEST PAIN, UNSPECIFIED TYPE: ICD-10-CM

## 2024-07-11 LAB
ALBUMIN SERPL BCP-MCNC: 4 G/DL (ref 3.2–4.9)
ALBUMIN/GLOB SERPL: 1.5 G/DL
ALP SERPL-CCNC: 108 U/L (ref 30–99)
ALT SERPL-CCNC: 18 U/L (ref 2–50)
ANION GAP SERPL CALC-SCNC: 15 MMOL/L (ref 7–16)
APTT PPP: 27.7 SEC (ref 24.7–36)
AST SERPL-CCNC: 18 U/L (ref 12–45)
BASOPHILS # BLD AUTO: 0.3 % (ref 0–1.8)
BASOPHILS # BLD: 0.04 K/UL (ref 0–0.12)
BILIRUB SERPL-MCNC: 0.4 MG/DL (ref 0.1–1.5)
BUN SERPL-MCNC: 16 MG/DL (ref 8–22)
CALCIUM ALBUM COR SERPL-MCNC: 9.7 MG/DL (ref 8.5–10.5)
CALCIUM SERPL-MCNC: 9.7 MG/DL (ref 8.5–10.5)
CHLORIDE SERPL-SCNC: 102 MMOL/L (ref 96–112)
CO2 SERPL-SCNC: 22 MMOL/L (ref 20–33)
CREAT SERPL-MCNC: 0.72 MG/DL (ref 0.5–1.4)
EKG IMPRESSION: NORMAL
EOSINOPHIL # BLD AUTO: 0.08 K/UL (ref 0–0.51)
EOSINOPHIL NFR BLD: 0.5 % (ref 0–6.9)
ERYTHROCYTE [DISTWIDTH] IN BLOOD BY AUTOMATED COUNT: 46.5 FL (ref 35.9–50)
GFR SERPLBLD CREATININE-BSD FMLA CKD-EPI: 98 ML/MIN/1.73 M 2
GLOBULIN SER CALC-MCNC: 2.7 G/DL (ref 1.9–3.5)
GLUCOSE SERPL-MCNC: 179 MG/DL (ref 65–99)
HCT VFR BLD AUTO: 50.7 % (ref 37–47)
HGB BLD-MCNC: 17.1 G/DL (ref 12–16)
IMM GRANULOCYTES # BLD AUTO: 0.07 K/UL (ref 0–0.11)
IMM GRANULOCYTES NFR BLD AUTO: 0.4 % (ref 0–0.9)
INR PPP: 0.93 (ref 0.87–1.13)
LIPASE SERPL-CCNC: 26 U/L (ref 11–82)
LYMPHOCYTES # BLD AUTO: 4.22 K/UL (ref 1–4.8)
LYMPHOCYTES NFR BLD: 27 % (ref 22–41)
MCH RBC QN AUTO: 32.3 PG (ref 27–33)
MCHC RBC AUTO-ENTMCNC: 33.7 G/DL (ref 32.2–35.5)
MCV RBC AUTO: 95.8 FL (ref 81.4–97.8)
MONOCYTES # BLD AUTO: 0.72 K/UL (ref 0–0.85)
MONOCYTES NFR BLD AUTO: 4.6 % (ref 0–13.4)
NEUTROPHILS # BLD AUTO: 10.49 K/UL (ref 1.82–7.42)
NEUTROPHILS NFR BLD: 67.2 % (ref 44–72)
NRBC # BLD AUTO: 0 K/UL
NRBC BLD-RTO: 0 /100 WBC (ref 0–0.2)
PLATELET # BLD AUTO: 243 K/UL (ref 164–446)
PMV BLD AUTO: 9.8 FL (ref 9–12.9)
POTASSIUM SERPL-SCNC: 3.8 MMOL/L (ref 3.6–5.5)
PROT SERPL-MCNC: 6.7 G/DL (ref 6–8.2)
PROTHROMBIN TIME: 12.6 SEC (ref 12–14.6)
RBC # BLD AUTO: 5.29 M/UL (ref 4.2–5.4)
SODIUM SERPL-SCNC: 139 MMOL/L (ref 135–145)
TROPONIN T SERPL-MCNC: 8 NG/L (ref 6–19)
TROPONIN T SERPL-MCNC: 8 NG/L (ref 6–19)
WBC # BLD AUTO: 15.6 K/UL (ref 4.8–10.8)

## 2024-07-11 PROCEDURE — 85610 PROTHROMBIN TIME: CPT

## 2024-07-11 PROCEDURE — 700111 HCHG RX REV CODE 636 W/ 250 OVERRIDE (IP): Mod: JZ | Performed by: EMERGENCY MEDICINE

## 2024-07-11 PROCEDURE — 93005 ELECTROCARDIOGRAM TRACING: CPT | Performed by: EMERGENCY MEDICINE

## 2024-07-11 PROCEDURE — 96374 THER/PROPH/DIAG INJ IV PUSH: CPT

## 2024-07-11 PROCEDURE — 80053 COMPREHEN METABOLIC PANEL: CPT

## 2024-07-11 PROCEDURE — 93005 ELECTROCARDIOGRAM TRACING: CPT

## 2024-07-11 PROCEDURE — 99285 EMERGENCY DEPT VISIT HI MDM: CPT

## 2024-07-11 PROCEDURE — 36415 COLL VENOUS BLD VENIPUNCTURE: CPT

## 2024-07-11 PROCEDURE — 700117 HCHG RX CONTRAST REV CODE 255: Performed by: EMERGENCY MEDICINE

## 2024-07-11 PROCEDURE — 700102 HCHG RX REV CODE 250 W/ 637 OVERRIDE(OP): Performed by: EMERGENCY MEDICINE

## 2024-07-11 PROCEDURE — A9270 NON-COVERED ITEM OR SERVICE: HCPCS | Performed by: EMERGENCY MEDICINE

## 2024-07-11 PROCEDURE — 85730 THROMBOPLASTIN TIME PARTIAL: CPT

## 2024-07-11 PROCEDURE — 84484 ASSAY OF TROPONIN QUANT: CPT

## 2024-07-11 PROCEDURE — 83690 ASSAY OF LIPASE: CPT

## 2024-07-11 PROCEDURE — 85025 COMPLETE CBC W/AUTO DIFF WBC: CPT

## 2024-07-11 PROCEDURE — 74175 CTA ABDOMEN W/CONTRAST: CPT

## 2024-07-11 PROCEDURE — 71045 X-RAY EXAM CHEST 1 VIEW: CPT

## 2024-07-11 RX ORDER — HYDRALAZINE HYDROCHLORIDE 20 MG/ML
10 INJECTION INTRAMUSCULAR; INTRAVENOUS EVERY 6 HOURS PRN
Status: DISCONTINUED | OUTPATIENT
Start: 2024-07-11 | End: 2024-07-11 | Stop reason: HOSPADM

## 2024-07-11 RX ORDER — HYDROCODONE BITARTRATE AND ACETAMINOPHEN 5; 325 MG/1; MG/1
1 TABLET ORAL ONCE
Status: COMPLETED | OUTPATIENT
Start: 2024-07-11 | End: 2024-07-11

## 2024-07-11 RX ORDER — HYDRALAZINE HYDROCHLORIDE 20 MG/ML
10 INJECTION INTRAMUSCULAR; INTRAVENOUS ONCE
Status: DISCONTINUED | OUTPATIENT
Start: 2024-07-11 | End: 2024-07-11 | Stop reason: HOSPADM

## 2024-07-11 RX ADMIN — HYDRALAZINE HYDROCHLORIDE 10 MG: 20 INJECTION, SOLUTION INTRAMUSCULAR; INTRAVENOUS at 13:21

## 2024-07-11 RX ADMIN — HYDROCODONE BITARTRATE AND ACETAMINOPHEN 1 TABLET: 5; 325 TABLET ORAL at 15:42

## 2024-07-11 RX ADMIN — IOHEXOL 100 ML: 350 INJECTION, SOLUTION INTRAVENOUS at 13:51

## 2024-07-11 ASSESSMENT — HEART SCORE
RISK FACTORS: 1-2 RISK FACTORS
HEART SCORE: 2
TROPONIN: LESS THAN OR EQUAL TO NORMAL LIMIT
AGE: 45-64
ECG: NORMAL
HISTORY: SLIGHTLY SUSPICIOUS

## 2024-07-11 ASSESSMENT — FIBROSIS 4 INDEX: FIB4 SCORE: 1.26

## 2024-07-11 ASSESSMENT — PAIN DESCRIPTION - PAIN TYPE
TYPE: ACUTE PAIN
TYPE: ACUTE PAIN

## 2024-09-10 ENCOUNTER — HOSPITAL ENCOUNTER (EMERGENCY)
Facility: MEDICAL CENTER | Age: 59
End: 2024-09-10
Attending: EMERGENCY MEDICINE
Payer: COMMERCIAL

## 2024-09-10 ENCOUNTER — APPOINTMENT (OUTPATIENT)
Dept: RADIOLOGY | Facility: MEDICAL CENTER | Age: 59
End: 2024-09-10
Attending: EMERGENCY MEDICINE
Payer: COMMERCIAL

## 2024-09-10 VITALS
HEART RATE: 66 BPM | BODY MASS INDEX: 50.47 KG/M2 | OXYGEN SATURATION: 92 % | TEMPERATURE: 98.2 F | RESPIRATION RATE: 18 BRPM | DIASTOLIC BLOOD PRESSURE: 89 MMHG | HEIGHT: 62 IN | SYSTOLIC BLOOD PRESSURE: 145 MMHG | WEIGHT: 274.25 LBS

## 2024-09-10 DIAGNOSIS — M53.3 SACROILIAC PAIN: ICD-10-CM

## 2024-09-10 DIAGNOSIS — S69.92XA INJURY OF LEFT WRIST, INITIAL ENCOUNTER: ICD-10-CM

## 2024-09-10 DIAGNOSIS — S49.92XA INJURY OF LEFT SHOULDER, INITIAL ENCOUNTER: ICD-10-CM

## 2024-09-10 DIAGNOSIS — S39.92XA INJURY OF LOW BACK, INITIAL ENCOUNTER: ICD-10-CM

## 2024-09-10 DIAGNOSIS — G89.29 CHRONIC BILATERAL LOW BACK PAIN WITHOUT SCIATICA: ICD-10-CM
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
## 2024-09-10 DIAGNOSIS — M54.50 CHRONIC BILATERAL LOW BACK PAIN WITHOUT SCIATICA: ICD-10-CM

## 2024-09-10 PROCEDURE — 72170 X-RAY EXAM OF PELVIS: CPT

## 2024-09-10 PROCEDURE — 700102 HCHG RX REV CODE 250 W/ 637 OVERRIDE(OP): Performed by: EMERGENCY MEDICINE

## 2024-09-10 PROCEDURE — 73130 X-RAY EXAM OF HAND: CPT | Mod: LT

## 2024-09-10 PROCEDURE — 73030 X-RAY EXAM OF SHOULDER: CPT | Mod: LT

## 2024-09-10 PROCEDURE — 73110 X-RAY EXAM OF WRIST: CPT | Mod: LT

## 2024-09-10 PROCEDURE — 72070 X-RAY EXAM THORAC SPINE 2VWS: CPT

## 2024-09-10 PROCEDURE — 99284 EMERGENCY DEPT VISIT MOD MDM: CPT

## 2024-09-10 PROCEDURE — A9270 NON-COVERED ITEM OR SERVICE: HCPCS | Performed by: EMERGENCY MEDICINE

## 2024-09-10 PROCEDURE — 72100 X-RAY EXAM L-S SPINE 2/3 VWS: CPT

## 2024-09-10 RX ORDER — HYDROCODONE BITARTRATE AND ACETAMINOPHEN 5; 325 MG/1; MG/1
1 TABLET ORAL ONCE
Status: COMPLETED | OUTPATIENT
Start: 2024-09-10 | End: 2024-09-10

## 2024-09-10 RX ADMIN — HYDROCODONE BITARTRATE AND ACETAMINOPHEN 1 TABLET: 5; 325 TABLET ORAL at 21:16

## 2024-09-10 ASSESSMENT — FIBROSIS 4 INDEX: FIB4 SCORE: .995187321669955775

## 2024-09-10 ASSESSMENT — PAIN DESCRIPTION - PAIN TYPE: TYPE: ACUTE PAIN

## 2024-09-11 NOTE — ED TRIAGE NOTES
"59 y.o. female Sharmin Mckay [place]  Chief Complaint   Patient presents with    GLF     Friday 09/06/2024 6 pm        Patient presented to ED with complaints of left arm, hip and leg pain following MGLF on Friday evening, patient endorses she was missed one step and fall on her left on concreet ground, head strike positive, denies any blood thiner use, Patient AAO X4 , Respirations even and unlabored on room air , afebrile at this time. Bruise on left arm. Partial weight bearing on left leg. CMS intact.      Head strike/ Injury Positive    Blood thinner/ ASA  Negative    LOC Negative    Event Amnesia Negative    Nausea / vomiting  Negative      Left arm bruise      Note: Verbal consent obtained for Clinical picture     BP (!) 224/119   Pulse 79   Temp 36.7 °C (98 °F) (Oral)   Resp 20   Ht 1.585 m (5' 2.4\")   Wt 124 kg (274 lb 4 oz)   LMP 04/17/2008   SpO2 95%   BMI 49.52 kg/m²     Past Medical History:   Diagnosis Date    ASTHMA     Hypertension     Physiological ovarian cysts      Past Surgical History:   Procedure Laterality Date    PB TOTAL KNEE ARTHROPLASTY Left 10/20/2022    Procedure: LEFT TOTAL KNEE ARTHROPLASTY;  Surgeon: Sreedhar Young M.D.;  Location: [facility] [place];  Service: Orthopedics    CYSTOSCOPY  3/24/2009    Performed by ZORAN BRIGGS at SURGERY SAME DAY [facility]    VAGINAL HYSTERECTOMY SCOPE TOTAL  3/24/2009    Performed by ZORAN BRIGGS at SURGERY SAME DAY [facility]    COMPL.ULNAR NERVE TRANSPOSITION W/ POSS AUTOGRAFT  2003    MASS EXCISION GENERAL Left 2003    left forearm - benign per patient    CHOLECYSTECTOMY      CHOLECYSTECTOMY      TX VAG DELIV ONLY,PREV C-SECTN      x 4    TUBAL LIGATION        Pt made aware of triage process, placed back into lobby, educated pt to tell staff of any worsening of symptoms       "

## 2024-09-11 NOTE — DISCHARGE INSTRUCTIONS
1.  Please call the orthopedic clinic listed above to schedule a follow-up appointment to recheck your shoulder.    2.  You may follow-up at [place] pain urgent care if you have worsening back pain, or for your chronic back pain.    3.  Please follow-up with your primary care clinic within 24 hours for complete recheck.

## 2024-09-11 NOTE — ED PROVIDER NOTES
Emergency Physician Note    Chief Concern:  Chief Complaint   Patient presents with    GLF     Friday 09/06/2024 6 pm            External Records Reviewed:  Inpatient records reviewed: Hospital medicine physician discharge summary reviewed from 3/16/2024.  On review of home medications, she is currently on 81 mg of aspirin daily, no anticoagulation nor antiplatelet agents identified.    HPI/ROS     Outside Historians:   Family member at bedside provides additional history.     HPI:  Sharmin Cardozo is a 59 y.o. female who presents to the emergency department today for evaluation after a fall that occurred 4 days ago.  She was walking down a step, when she lost her balance and fell striking the left shoulder, left arm.  She reports that she did hit her head, however fell near cabinets, her head did not strike the ground with any significant force.  She has had no headache, no nausea, no vomiting since that time.  She is not currently anticoagulated, does take 81 mg of aspirin daily.  She has had no new or worsening headache since the time of the fall.  She reports no associated neck pain, states she did not strike her neck.  She does have some pain localized to the low back, she reports a history of pain to the lower lumbar spine, which has been worse since the fall.  She has not had any abdominal pain, no chest pain, no shortness of breath.  She has no nausea or vomiting.  She has been able to get up and ambulate, was able to work a full day today.  She reports no pain to the pelvis, no hip pain, no difficulty walking, is able to bear full weight on both legs.    PAST MEDICAL HISTORY  Past Medical History:   Diagnosis Date    ASTHMA     Hypertension     Physiological ovarian cysts        SURGICAL HISTORY  Past Surgical History:   Procedure Laterality Date    PB TOTAL KNEE ARTHROPLASTY Left 10/20/2022    Procedure: LEFT TOTAL KNEE ARTHROPLASTY;  Surgeon: Sreedhar Young M.D.;  Location: [facility]  [place];  Service: Orthopedics    CYSTOSCOPY  3/24/2009    Performed by ZORAN BRIGGS at SURGERY SAME DAY [facility]    VAGINAL HYSTERECTOMY SCOPE TOTAL  3/24/2009    Performed by ZORAN BRIGGS at SURGERY SAME DAY [facility]    COMPL.ULNAR NERVE TRANSPOSITION W/ POSS AUTOGRAFT  2003    MASS EXCISION GENERAL Left 2003    left forearm - benign per patient    CHOLECYSTECTOMY      CHOLECYSTECTOMY      MA VAG DELIV ONLY,PREV C-SECTN      x 4    TUBAL LIGATION         FAMILY HISTORY  Family History   Problem Relation Age of Onset    Heart Disease Father        SOCIAL HISTORY   reports that she has been smoking cigarettes. She has never used smokeless tobacco. She reports that she does not currently use alcohol. She reports that she does not use drugs.    CURRENT MEDICATIONS  Previous Medications    ALBUTEROL 108 (90 BASE) MCG/ACT AERO SOLN INHALATION AEROSOL    Inhale 2 Puffs by mouth every 6 hours as needed for Shortness of Breath.    ASPIRIN 81 MG EC TABLET    Take 81 mg by mouth every day.    BENZONATATE (TESSALON) 100 MG CAP    Take 1 Capsule by mouth 3 times a day as needed for Cough (3rd line for cough).    GUAIFENESIN DEXTROMETHORPHAN (ROBITUSSIN DM) 100-10 MG/5ML SYRUP SYRUP    Take 10 mL by mouth every 6 hours as needed for Cough (2nd line for cough).    LOSARTAN (COZAAR) 50 MG TAB    Take 1 Tablet by mouth every day.    METFORMIN (GLUCOPHAGE) 500 MG TAB    Take 1 Tablet by mouth 2 times a day with meals.    METOPROLOL SR (TOPROL XL) 50 MG TABLET SR 24 HR    Take 50 mg by mouth every day.    MOMETASONE-FORMOTEROL (DULERA) 200-5 MCG/ACT AEROSOL    Inhale 2 Puffs 2 times a day.    MULTIPLE VITAMIN (DAILY VITAMIN PO)    Take 1 Tab by mouth every day.    NICOTINE (NICODERM) 21 MG/24HR PATCH 24 HR    Place 1 Patch on the skin every 24 hours.    OMEPRAZOLE (PRILOSEC) 20 MG DELAYED-RELEASE CAPSULE    Take 1 Capsule by mouth 2 times a day.    PHENYLEPH-TRIPROLIDINE-DM-APAP (MUCINEX NIGHT SEV COLD/FLU MAX)  "10-2.5- MG/20ML SOLUTION    Take 20 mL by mouth at bedtime as needed (Cough, Mild Pain or Fever).    PHENYLEPHRINE-DM-GG-APAP (MUCINEX FAST-MAX COLD FLU) 5--325 MG/10ML LIQUID    Take 20 mL by mouth every four hours as needed (Cough, Mild Pain or Fever).    TIOTROPIUM (SPIRIVA RESPIMAT) 2.5 MCG/ACT AERO SOLN    Inhale 2 Inhalations every day.       ALLERGIES  Tetanus toxoid, Bactrim [sulfamethoxazole w-trimethoprim], Iodine, and Sulfa drugs    PHYSICAL EXAM  Vital Signs: BP (!) 145/89   Pulse 66   Temp 36.8 °C (98.2 °F) (Temporal)   Resp 18   Ht 1.585 m (5' 2.4\")   Wt 124 kg (274 lb 4 oz)   LMP 04/17/2008   SpO2 92%   BMI 49.52 kg/m²   Constitutional: Alert, no acute distress  HENT: Normocephalic, atraumatic.  Cardiovascular: No tachycardia, regular rate and rhythm  Pulmonary: No respiratory distress, normal work of breathing, breath sounds quite equal bilaterally  Abdomen: Soft, non tender, no peritoneal signs.  Skin: Warm, dry, bruise overlying the lateral aspect of the left elbow  Musculoskeletal: She does have some decreased range of motion of the left shoulder due to pain, no visible deformity.  She is able to fully range the left elbow.  She is also able to fully range the left wrist, but states she has some pain localized to the left hand.  No tenderness to palpation of the anatomic snuffbox.  She also has a small amount of edema to the left hand.  Bruising to the lateral aspect of the left elbow as documented above.  Tenderness present to the lumbosacral region, and sacroiliac region.  Neurologic: Sensory and motor function intact in the left upper extremity.  Normal gait, 5 out of 5 bilateral lower extremity strength.    Diagnostic Studies & Procedures    Labs:  All labs reviewed by me as noted below.    Radiology:  The attending Emergency Physician has independently interpreted the following imaging:  I independently interpreted the plain film of the lumbar spine, no pathologic lesion, " no acute fracture or dislocation.    DX-LUMBAR SPINE-2 OR 3 VIEWS   Final Result      Degenerative changes of lumbar spine without obvious fracture or malalignment.      DX-SHOULDER 2+ LEFT   Final Result      No radiographic evidence of acute traumatic injury.      DX-WRIST-COMPLETE 3+ LEFT   Final Result      No radiographic evidence of acute traumatic injury.      DX-HAND 3+ LEFT   Final Result      No radiographic evidence of acute traumatic injury.      DX-PELVIS-1 OR 2 VIEWS   Final Result      No acute osseous abnormality.      DX-THORACIC SPINE-2 VIEWS   Final Result      Degenerative changes of the thoracic spine without obvious fracture or malalignment.          Course and Medical Decision Making    Initial Assessment and Plan:  Ms. Cardozo presents to the emergency department today for evaluation after a fall from standing that occurred 4 days ago.  She reports no headache, no neck pain, cervical spine is cleared utilizing Nexus criteria.  Her primary concern is low back pain, as well as pain to the left upper extremity.  She does not report any incontinence, no difficulty with ambulation, no lower extremity weakness, no red flag symptoms concerning for cauda equina or spinal cord injury.      Plain film of the shoulder is negative for acute bony injury.  Plain film of the lumbar and thoracic spine are negative for acute fracture or malalignment.  Plain film of the left hand and wrist are negative for fracture or malalignment.    At this time, do not believe she requires any further emergent diagnostics nor treatment.  On further discussion, she is currently taking ibuprofen, as well as a muscle relaxer for chronic back pain without significant improvement.  Plan is for referral to orthopedics for further evaluation of left shoulder injury, in the event that this is a sprain, strain, or rotator cuff injury.  With regard to her acute exacerbation of chronic back pain, provided follow-up information for  [facility] urgent care.  Additionally, she will follow-up with her primary care clinic, she will call tomorrow to schedule an appointment within 24 to 48 hours. Return precautions were discussed with the patient, and provided in written form with the patient's discharge instructions.       Lilliam Cuenca M.D. spent 4 minutes with the patient explaining the importance of smoking cessation with regard to healing after an injury.  Smoking cessation resources were offered including nicotine replacement therapy, which was declined.    Additional Problems and Disposition    Escalation of care considered, and ultimately not performed:  1.  CT brain -no anticoagulation, no headache, patient does not report severe head strike mechanism, no neck pain, no nausea or vomiting, injury occurred 4 days ago.  Do not believe CT brain is indicated on an emergent basis at this time.    Decision tools utilized including but not limited to:  1.  Nexus criteria    Prescription medication considerations and management:  1.  Nonsteroidal anti-inflammatory and muscle relaxer prescriptions were considered, however patient is already on both of these medications chronically.    Disposition:  Discharged in stable condition    FINAL IMPRESSION   1. Fall, initial encounter    2. Injury of left shoulder, initial encounter    3. Injury of left wrist, initial encounter    4. Injury of low back, initial encounter    5. Chronic bilateral low back pain without sciatica    6. Sacroiliac pain        FOLLOW UP:  Jose Miguel Chaudhari P.A.-C.  [address]  [address]  [place] [address] [phone]    Call in 1 day      Sin Pitt M.D.  [address]  [place] [id number] [phone]    Schedule an appointment as soon as possible for a visit       [facility]  [address] [place] [address] A  [place] [address]  [phone]    Go to   As needed    [facility], Emergency Dept  [address] [facility]  [address]  [phone]  Go to   If symptoms worsen

## 2024-09-11 NOTE — ED NOTES
"Pt discharged home with , pt A&Ox4, on room air, steady gait. pt in possession of belongings. Pt provided discharge education and information pertaining to medications and follow up appointments. Pt received copy of discharge instructions and verbalized understanding. BP (!) 145/89   Pulse 66   Temp 36.8 °C (98.2 °F) (Temporal)   Resp 18   Ht 1.585 m (5' 2.4\")   Wt 124 kg (274 lb 4 oz)   LMP 04/17/2008   SpO2 92%   BMI 49.52 kg/m²     "

## 2024-11-04 NOTE — LETTER
March 17, 2018         Patient: Sharmin Cardozo   YOB: 1965   Date of Visit: 3/17/2018           To Whom it May Concern:    Sharmin Cardozo was seen in my clinic on 3/17/2018. She may return to work on 3/20/18.    If you have any questions or concerns, please don't hesitate to call.        Sincerely,           ALEAH Lemon.  Electronically Signed     
None known

## 2024-11-07 NOTE — ASSESSMENT & PLAN NOTE
Problem: At Risk for Falls  Goal: Patient does not fall  Outcome: Monitoring/Evaluating progress  Goal: Patient takes action to control fall-related risks  Outcome: Monitoring/Evaluating progress     Problem: Skin Integrity Alteration  Goal: Skin remains intact with no new/deterioration of wound or pressure injury  Outcome: Monitoring/Evaluating progress  Goal: Participates in wound care activities  Outcome: Monitoring/Evaluating progress     Problem: Breathing Pattern Ineffective  Goal: Air exchange is effective, demonstrated by Sp02 sat of greater then or = 92% (or as ordered)  Outcome: Monitoring/Evaluating progress  Goal: Respiratory pattern is quiet and regular without report of SOB  Outcome: Monitoring/Evaluating progress     Problem: Pneumonia  Goal: S/S of acute pneumonia are resolved  Description: If acute pneumonia is present, monitor for resolution of fever, cough, secretions and other test values based on presentation.  Outcome: Monitoring/Evaluating progress  Goal: Verbalizes understanding of pneumonia, treatment, and ongoing prevention  Description: Document on Patient Education Activity  Outcome: Monitoring/Evaluating progress     Problem: Fluid Volume Excess  Goal: Fluid Volume Excess Symptoms Resolved  Description: Treatment often consists of oxygen and respiratory support with diuretic therapy at doses that exceed usual dose (typically doubled).  Monitor patient response to treatment.    Acute dyspnea should resolve quickly if dose is adequate and kidney function is adequate. Dyspnea/SOB should only be observed with Activity after effective treatment. Patient should be able to lie down comfortably, without SOB.  Outcome: Monitoring/Evaluating progress  Goal: Oxygenation is maintained (SpO2 greater than or equal to 90% or as ordered)  Outcome: Monitoring/Evaluating progress      Continue home telmisartan, hydrochlorothiazide, metoprolol

## 2024-11-26 ENCOUNTER — HOSPITAL ENCOUNTER (EMERGENCY)
Facility: MEDICAL CENTER | Age: 59
End: 2024-11-26
Attending: EMERGENCY MEDICINE
Payer: COMMERCIAL

## 2024-11-26 ENCOUNTER — APPOINTMENT (OUTPATIENT)
Dept: RADIOLOGY | Facility: MEDICAL CENTER | Age: 59
End: 2024-11-26
Attending: EMERGENCY MEDICINE
Payer: COMMERCIAL

## 2024-11-26 VITALS
OXYGEN SATURATION: 94 % | WEIGHT: 273 LBS | DIASTOLIC BLOOD PRESSURE: 95 MMHG | TEMPERATURE: 98 F | SYSTOLIC BLOOD PRESSURE: 130 MMHG | HEART RATE: 97 BPM | HEIGHT: 62 IN | RESPIRATION RATE: 16 BRPM | BODY MASS INDEX: 50.24 KG/M2

## 2024-11-26 DIAGNOSIS — R07.9 ACUTE CHEST PAIN: ICD-10-CM

## 2024-11-26 LAB
ALBUMIN SERPL BCP-MCNC: 4.1 G/DL (ref 3.2–4.9)
ALBUMIN/GLOB SERPL: 1.5 G/DL
ALP SERPL-CCNC: 98 U/L (ref 30–99)
ALT SERPL-CCNC: 25 U/L (ref 2–50)
ANION GAP SERPL CALC-SCNC: 11 MMOL/L (ref 7–16)
AST SERPL-CCNC: 22 U/L (ref 12–45)
BASOPHILS # BLD AUTO: 0.3 % (ref 0–1.8)
BASOPHILS # BLD: 0.03 K/UL (ref 0–0.12)
BILIRUB SERPL-MCNC: 0.3 MG/DL (ref 0.1–1.5)
BUN SERPL-MCNC: 17 MG/DL (ref 8–22)
CALCIUM ALBUM COR SERPL-MCNC: 9.6 MG/DL (ref 8.5–10.5)
CALCIUM SERPL-MCNC: 9.7 MG/DL (ref 8.5–10.5)
CHLORIDE SERPL-SCNC: 101 MMOL/L (ref 96–112)
CO2 SERPL-SCNC: 27 MMOL/L (ref 20–33)
CREAT SERPL-MCNC: 0.77 MG/DL (ref 0.5–1.4)
D DIMER PPP IA.FEU-MCNC: 0.39 UG/ML (FEU) (ref 0–0.5)
EKG IMPRESSION: NORMAL
EOSINOPHIL # BLD AUTO: 0.11 K/UL (ref 0–0.51)
EOSINOPHIL NFR BLD: 1.1 % (ref 0–6.9)
ERYTHROCYTE [DISTWIDTH] IN BLOOD BY AUTOMATED COUNT: 46.2 FL (ref 35.9–50)
GFR SERPLBLD CREATININE-BSD FMLA CKD-EPI: 90 ML/MIN/1.73 M 2
GLOBULIN SER CALC-MCNC: 2.8 G/DL (ref 1.9–3.5)
GLUCOSE SERPL-MCNC: 188 MG/DL (ref 65–99)
HCT VFR BLD AUTO: 48.3 % (ref 37–47)
HGB BLD-MCNC: 16.7 G/DL (ref 12–16)
IMM GRANULOCYTES # BLD AUTO: 0.06 K/UL (ref 0–0.11)
IMM GRANULOCYTES NFR BLD AUTO: 0.6 % (ref 0–0.9)
LYMPHOCYTES # BLD AUTO: 3.28 K/UL (ref 1–4.8)
LYMPHOCYTES NFR BLD: 32.3 % (ref 22–41)
MCH RBC QN AUTO: 32.9 PG (ref 27–33)
MCHC RBC AUTO-ENTMCNC: 34.6 G/DL (ref 32.2–35.5)
MCV RBC AUTO: 95.3 FL (ref 81.4–97.8)
MONOCYTES # BLD AUTO: 0.54 K/UL (ref 0–0.85)
MONOCYTES NFR BLD AUTO: 5.3 % (ref 0–13.4)
NEUTROPHILS # BLD AUTO: 6.14 K/UL (ref 1.82–7.42)
NEUTROPHILS NFR BLD: 60.4 % (ref 44–72)
NRBC # BLD AUTO: 0 K/UL
NRBC BLD-RTO: 0 /100 WBC (ref 0–0.2)
NT-PROBNP SERPL IA-MCNC: 130 PG/ML (ref 0–125)
PLATELET # BLD AUTO: 234 K/UL (ref 164–446)
PMV BLD AUTO: 9.9 FL (ref 9–12.9)
POTASSIUM SERPL-SCNC: 3.8 MMOL/L (ref 3.6–5.5)
PROT SERPL-MCNC: 6.9 G/DL (ref 6–8.2)
RBC # BLD AUTO: 5.07 M/UL (ref 4.2–5.4)
SODIUM SERPL-SCNC: 139 MMOL/L (ref 135–145)
TROPONIN T SERPL-MCNC: 8 NG/L (ref 6–19)
TROPONIN T SERPL-MCNC: 9 NG/L (ref 6–19)
WBC # BLD AUTO: 10.2 K/UL (ref 4.8–10.8)

## 2024-11-26 PROCEDURE — 85025 COMPLETE CBC W/AUTO DIFF WBC: CPT

## 2024-11-26 PROCEDURE — 36415 COLL VENOUS BLD VENIPUNCTURE: CPT

## 2024-11-26 PROCEDURE — 99284 EMERGENCY DEPT VISIT MOD MDM: CPT

## 2024-11-26 PROCEDURE — 80053 COMPREHEN METABOLIC PANEL: CPT

## 2024-11-26 PROCEDURE — 93005 ELECTROCARDIOGRAM TRACING: CPT

## 2024-11-26 PROCEDURE — 83880 ASSAY OF NATRIURETIC PEPTIDE: CPT

## 2024-11-26 PROCEDURE — 71045 X-RAY EXAM CHEST 1 VIEW: CPT

## 2024-11-26 PROCEDURE — 84484 ASSAY OF TROPONIN QUANT: CPT

## 2024-11-26 PROCEDURE — 85379 FIBRIN DEGRADATION QUANT: CPT

## 2024-11-26 PROCEDURE — 93005 ELECTROCARDIOGRAM TRACING: CPT | Performed by: EMERGENCY MEDICINE

## 2024-11-26 ASSESSMENT — HEART SCORE
AGE: 45-64
HISTORY: SLIGHTLY SUSPICIOUS
TROPONIN: LESS THAN OR EQUAL TO NORMAL LIMIT
RISK FACTORS: 1-2 RISK FACTORS
HEART SCORE: 2
ECG: NORMAL

## 2024-11-26 ASSESSMENT — FIBROSIS 4 INDEX: FIB4 SCORE: .995187321669955775

## 2024-11-26 NOTE — ED PROVIDER NOTES
ED Provider Note    CHIEF COMPLAINT  Chief Complaint   Patient presents with    Chest Pain     X one hour ago mid chest    Shortness of Breath     X 2 weeks     N/V       EXTERNAL RECORDS REVIEWED  Reviewed CT angiogram of the chest and abdomen from 2024 demonstrating no aneurysm or dissection    HPI/ROS  LIMITATION TO HISTORY   None  OUTSIDE HISTORIAN(S):  None    Sharmin Cardozo is a 59 y.o. female who presents for evaluation of chest discomfort that started around an hour ago.  She reports anxiety and stabbing pain in her chest.  Patient does have a history of hypertension and type 2 diabetes.  No known history of coronary artery disease.  Of note the patient was evaluated for similar type pain back in July and underwent extensive evaluation including CT angiogram of the chest which was negative for any pulmonary emboli, aortic dissection aneurysm etc.  She does not endorse any leg swelling or hemoptysis.  No epigastric pain hematemesis hematochezia.  No high fevers or chills.  Patient denies any focal numbness weakness tingling to the arms legs or face.    PAST MEDICAL HISTORY   has a past medical history of ASTHMA, Hypertension, and Physiological ovarian cysts.    SURGICAL HISTORY   has a past surgical history that includes vag deliv only,prev c-sectn; tubal ligation; cystoscopy (3/24/2009); cholecystectomy; vaginal hysterectomy scope total (3/24/2009); compl.ulnar nerve transposition w/ poss autograft (); mass excision general (Left, ); cholecystectomy; and total knee arthroplasty (Left, 10/20/2022).    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    Current Outpatient Medications:     benzonatate (TESSALON) 100 MG Cap, Take 1 Capsule by mouth 3 times a day as needed for Cough (3rd line for cough)., Disp: 60 Capsule, Rfl: 0    guaiFENesin dextromethorphan (ROBITUSSIN DM) 100-10 MG/5ML Syrup syrup, Take 10 mL by mouth every 6 hours as needed for Cough (2nd line for cough)., Disp: 840 mL, Rfl: 0    metFORMIN (GLUCOPHAGE) 500 MG Tab, Take 1 Tablet by mouth 2 times a day with meals., Disp: 60 Tablet, Rfl: 0    mometasone-formoterol (DULERA) 200-5 MCG/ACT Aerosol, Inhale 2 Puffs 2 times a day., Disp: 13 g, Rfl: 0    omeprazole (PRILOSEC) 20 MG delayed-release capsule, Take 1 Capsule by mouth 2 times a day., Disp: 60 Capsule, Rfl: 0    tiotropium (SPIRIVA RESPIMAT) 2.5 mcg/Act Aero Soln, Inhale 2 Inhalations every day., Disp: 4 g, Rfl: 0    losartan (COZAAR) 50 MG Tab, Take 1 Tablet by mouth every day., Disp: 30 Tablet, Rfl: 0    nicotine (NICODERM) 21 MG/24HR PATCH 24 HR, Place 1 Patch on the skin every 24 hours., Disp: 30 Patch, Rfl: 0    metoprolol SR (TOPROL XL) 50 MG TABLET SR 24 HR, Take 50 mg by mouth every day., Disp: , Rfl:     aspirin 81 MG EC tablet, Take 81 mg by mouth every day., Disp: , Rfl:     Phenylephrine-DM-GG-APAP (MUCINEX FAST-MAX COLD FLU) 5--325 MG/10ML Liquid, Take 20 mL by mouth every four hours as needed (Cough, Mild Pain or Fever)., Disp: , Rfl:     Phenyleph-Triprolidine-DM-APAP (MUCINEX NIGHT SEV COLD/FLU MAX) 10-2.5- MG/20ML Solution, Take 20 mL by  "mouth at bedtime as needed (Cough, Mild Pain or Fever)., Disp: , Rfl:     albuterol 108 (90 Base) MCG/ACT Aero Soln inhalation aerosol, Inhale 2 Puffs by mouth every 6 hours as needed for Shortness of Breath., Disp: 8.5 g, Rfl: 1    Multiple Vitamin (DAILY VITAMIN PO), Take 1 Tab by mouth every day., Disp: , Rfl:       ALLERGIES  Allergies   Allergen Reactions    Tetanus Toxoid Rash and Swelling     Rash & swelling at injection site from tetanus vaccine     Bactrim [Sulfamethoxazole W-Trimethoprim] Vomiting and Nausea    Iodine Vomiting and Nausea    Sulfa Drugs Vomiting and Nausea       PHYSICAL EXAM  VITAL SIGNS: BP (!) 170/89   Pulse 96   Resp 16   Ht 1.575 m (5' 2\")   Wt 124 kg (273 lb)   LMP 04/17/2008   SpO2 94%   BMI 49.93 kg/m²    Pulse ox interpretation: I interpret this pulse ox as normal.  Constitutional: Alert and oriented x 3, severe distress  HEENT: Atraumatic normocephalic, pupils are equal round reactive to light extraocular movements are intact. The nares is clear, external ears are normal, mouth shows moist mucous membranes normal dentition for age  Neck: Supple, no JVD no tracheal deviation  Cardiovascular: Regular rate and rhythm no murmur rub or gallop 2+ pulses peripherally x4  Thorax & Lungs: No respiratory distress, no wheezes rales or rhonchi, No chest tenderness.   GI: Soft nontender nondistended positive bowel sounds, no peritoneal signs  Skin: Warm dry no acute rash or lesion  Musculoskeletal: Moving all extremities with full range and 5 of 5 strength no acute  deformity  Neurologic: Cranial nerves III through XII are grossly intact no sensory deficit no cerebellar dysfunction   Psychiatric: Anxious        EKG/LABS  Results for orders placed or performed during the hospital encounter of 11/26/24   EKG    Collection Time: 11/26/24  2:50 PM   Result Value Ref Range    Report       [facility] Emergency Dept.    Test Date:  2024-11-26  Pt Name:    ERENDIRA MARRERO        "           Department: ER  MRN:        [id number]                      Room:  Gender:     Female                       Technician: [id number]  :        [id number]                   Requested By:ER TRIAGE PROTOCOL  Order #:    [id number]                    Reading MD:    Measurements  Intervals                                Axis  Rate:       91                           P:          71  OK:         152                          QRS:        269  QRSD:       91                           T:          36  QT:         360  QTc:        443    Interpretive Statements  Sinus rhythm  Consider right atrial enlargement  Left anterior fascicular block  Abnormal R-wave progression, late transition  Compared to ECG 2024 12:52:12  Left anterior fascicular block now present  Left-axis deviation no longer present  ST (T wave) deviation no longer present  T-wave abnormality no longer present  Possible is chemia no longer present     CBC with Differential    Collection Time: 24  3:26 PM   Result Value Ref Range    WBC 10.2 4.8 - 10.8 K/uL    RBC 5.07 4.20 - 5.40 M/uL    Hemoglobin 16.7 (H) 12.0 - 16.0 g/dL    Hematocrit 48.3 (H) 37.0 - 47.0 %    MCV 95.3 81.4 - 97.8 fL    MCH 32.9 27.0 - 33.0 pg    MCHC 34.6 32.2 - 35.5 g/dL    RDW 46.2 35.9 - 50.0 fL    Platelet Count 234 164 - 446 K/uL    MPV 9.9 9.0 - 12.9 fL    Neutrophils-Polys 60.40 44.00 - 72.00 %    Lymphocytes 32.30 22.00 - 41.00 %    Monocytes 5.30 0.00 - 13.40 %    Eosinophils 1.10 0.00 - 6.90 %    Basophils 0.30 0.00 - 1.80 %    Immature Granulocytes 0.60 0.00 - 0.90 %    Nucleated RBC 0.00 0.00 - 0.20 /100 WBC    Neutrophils (Absolute) 6.14 1.82 - 7.42 K/uL    Lymphs (Absolute) 3.28 1.00 - 4.80 K/uL    Monos (Absolute) 0.54 0.00 - 0.85 K/uL    Eos (Absolute) 0.11 0.00 - 0.51 K/uL    Baso (Absolute) 0.03 0.00 - 0.12 K/uL    Immature Granulocytes (abs) 0.06 0.00 - 0.11 K/uL    NRBC (Absolute) 0.00 K/uL   Complete Metabolic Panel (CMP)    Collection Time: 24  3:26 PM    Result Value Ref Range    Sodium 139 135 - 145 mmol/L    Potassium 3.8 3.6 - 5.5 mmol/L    Chloride 101 96 - 112 mmol/L    Co2 27 20 - 33 mmol/L    Anion Gap 11.0 7.0 - 16.0    Glucose 188 (H) 65 - 99 mg/dL    Bun 17 8 - 22 mg/dL    Creatinine 0.77 0.50 - 1.40 mg/dL    Calcium 9.7 8.5 - 10.5 mg/dL    Correct Calcium 9.6 8.5 - 10.5 mg/dL    AST(SGOT) 22 12 - 45 U/L    ALT(SGPT) 25 2 - 50 U/L    Alkaline Phosphatase 98 30 - 99 U/L    Total Bilirubin 0.3 0.1 - 1.5 mg/dL    Albumin 4.1 3.2 - 4.9 g/dL    Total Protein 6.9 6.0 - 8.2 g/dL    Globulin 2.8 1.9 - 3.5 g/dL    A-G Ratio 1.5 g/dL   proBrain Natriuretic Peptide, NT (BNP)    Collection Time: 11/26/24  3:26 PM   Result Value Ref Range    NT-proBNP 130 (H) 0 - 125 pg/mL   Troponins NOW    Collection Time: 11/26/24  3:26 PM   Result Value Ref Range    Troponin T 9 6 - 19 ng/L   D-DIMER    Collection Time: 11/26/24  3:26 PM   Result Value Ref Range    D-Dimer 0.39 0.00 - 0.50 ug/mL (FEU)   ESTIMATED GFR    Collection Time: 11/26/24  3:26 PM   Result Value Ref Range    GFR (CKD-EPI) 90 >60 mL/min/1.73 m 2   Troponins in two (2) hours    Collection Time: 11/26/24  5:39 PM   Result Value Ref Range    Troponin T 8 6 - 19 ng/L     *Note: Due to a large number of results and/or encounters for the requested time period, some results have not been displayed. A complete set of results can be found in Results Review.       I have independently interpreted this EKG  12-lead EKG, rate 91.  Sinus rhythm.  No acute ST segment elevation or pathological T wave version possibly poor R wave progression no signs of heart block  RADIOLOGY/PROCEDURES   I have independently interpreted the diagnostic imaging associated with this visit and am waiting the final reading from the radiologist.   My preliminary interpretation is as follows: No acute cardiopulmonary process    Radiologist interpretation:  DX-CHEST-PORTABLE (1 VIEW)   Final Result      No acute cardiac or pulmonary abnormalities  are identified.          COURSE & MEDICAL DECISION MAKING    NM-CARDIAC STRESS TEST  Order: [id number]  Impression      1. Negative pharmacological stress test for ischemia or infarction.  2. Hyperdynamic left ventricular systolic function with a calculated ejection fraction of more than 70 % with normal wall motion.  3. No chest pain and no ischemic EKG changes seen with pharmacologic stress.  Narrative    INDICATION: Chest painASSESSMENT, COURSE AND PLAN  Care Narrative:    This is a very pleasant 59-year-old female presents here with substernal chest discomfort and subjective shortness of breath.  On arrival her blood pressure was slightly elevated but she had no tachycardia hypoxia or high fever or increased work of breathing.  Chest pain protocol was immediately initiated.  EKG does not demonstrate any evidence of obvious ischemia or arrhythmia.  Here her white blood cell count is normal hemoglobin is normal metabolic panel is unremarkable.  D-dimer is negative BNP is only marginally elevated at 130.  Chest x-ray did not demonstrate any acute cardiopulmonary process.  Serial 2-hour delta troponins are 9 and 8 respectively suggesting against acute ischemia.  She was observed for several hours.  I performed an extensive chart review and the patient was seen here just a few months ago and had a CT angiogram of the chest demonstrating no pulmonary embolism no aortic aneurysm or dissection and she also had a recent nuclear medicine stress test recently.  I had a long discussion with the patient.  I reassured her that I feel that we have ruled out all major cardiopulmonary emergencies.  Etiology of pain could be diaphragmatic spasm, bronchitis or acid reflux.  She has had a recent viral URI and I suspect some of her chest discomfort is costochondral.  I counseled her to take NSAIDs and to return as needed for new or worsening symptoms          ADDITIONAL PROBLEMS MANAGED      DISPOSITION AND DISCUSSIONS  I have  discussed management of the patient with the following physicians and DEEDEE's: None    Discussion of management with other Q or appropriate source(s): None    Escalation of care considered, and ultimately not performed: Considered admission    Barriers to care at this time, including but not limited to: None.     Decision tools and prescription drugs considered including, but not limited to: Heart score was utilized and is low at 2.    FINAL DIAGNOSIS  1. Acute chest pain               Electronically signed by: Javier Gonzalez M.D., 11/26/2024 3:26 PM

## 2024-11-26 NOTE — ED TRIAGE NOTES
Chief Complaint   Patient presents with    Chest Pain     X one hour ago mid chest    Shortness of Breath     X 2 weeks     N/V     Patient took 81 mg ASA at home PTA of EMS. Patient given 324 mh ASA by EMS, 4 mg Zofran and 0.4 nitro by EMS PTA to ED.

## 2024-11-27 NOTE — ED NOTES
Pt discharged to lobby with steady gait with . GCS 15. IV discontinued and gauze placed, pt in possession of belongings. Pt provided discharge education and information pertaining to medications and follow up appointments. Pt received copy of discharge instructions and verbalized understanding.     Vitals:    11/26/24 1900   BP: (!) 130/95   Pulse: 97   Resp: 16   Temp: 36.7 °C (98 °F)   SpO2: 94%

## 2024-11-27 NOTE — ED NOTES
Patient continues to rest comfortably on gurney, respiration noted, pt on 2L via NC, monitor place.

## 2025-01-29 NOTE — ED TRIAGE NOTES
"Pt arrives to ED from work where she experienced an episode of lightheadness and near syncope. Pt reports she was outside and became very ligthheaded but did not actually pass out or fal down. Pt now c/o pan to left chest wall and \"not feeling right\"  " Home

## 2025-05-26 ENCOUNTER — APPOINTMENT (OUTPATIENT)
Dept: RADIOLOGY | Facility: MEDICAL CENTER | Age: 60
End: 2025-05-26
Attending: STUDENT IN AN ORGANIZED HEALTH CARE EDUCATION/TRAINING PROGRAM
Payer: COMMERCIAL

## 2025-05-26 ENCOUNTER — HOSPITAL ENCOUNTER (EMERGENCY)
Facility: MEDICAL CENTER | Age: 60
End: 2025-05-26
Attending: STUDENT IN AN ORGANIZED HEALTH CARE EDUCATION/TRAINING PROGRAM
Payer: COMMERCIAL

## 2025-05-26 VITALS
HEIGHT: 62 IN | DIASTOLIC BLOOD PRESSURE: 81 MMHG | WEIGHT: 262 LBS | BODY MASS INDEX: 48.21 KG/M2 | SYSTOLIC BLOOD PRESSURE: 159 MMHG | HEART RATE: 83 BPM | TEMPERATURE: 97.5 F | OXYGEN SATURATION: 97 % | RESPIRATION RATE: 17 BRPM

## 2025-05-26 DIAGNOSIS — R55 NEAR SYNCOPE: ICD-10-CM

## 2025-05-26 DIAGNOSIS — E86.0 DEHYDRATION: Primary | ICD-10-CM

## 2025-05-26 LAB
ALBUMIN SERPL BCP-MCNC: 4 G/DL (ref 3.2–4.9)
ALBUMIN/GLOB SERPL: 1.2 G/DL
ALP SERPL-CCNC: 108 U/L (ref 30–99)
ALT SERPL-CCNC: 25 U/L (ref 2–50)
ANION GAP SERPL CALC-SCNC: 15 MMOL/L (ref 7–16)
AST SERPL-CCNC: 23 U/L (ref 12–45)
BASOPHILS # BLD AUTO: 0.4 % (ref 0–1.8)
BASOPHILS # BLD: 0.05 K/UL (ref 0–0.12)
BILIRUB SERPL-MCNC: 0.5 MG/DL (ref 0.1–1.5)
BUN SERPL-MCNC: 18 MG/DL (ref 8–22)
CALCIUM ALBUM COR SERPL-MCNC: 9.8 MG/DL (ref 8.5–10.5)
CALCIUM SERPL-MCNC: 9.8 MG/DL (ref 8.5–10.5)
CHLORIDE SERPL-SCNC: 100 MMOL/L (ref 96–112)
CO2 SERPL-SCNC: 25 MMOL/L (ref 20–33)
CREAT SERPL-MCNC: 0.89 MG/DL (ref 0.5–1.4)
EKG IMPRESSION: NORMAL
EOSINOPHIL # BLD AUTO: 0.07 K/UL (ref 0–0.51)
EOSINOPHIL NFR BLD: 0.5 % (ref 0–6.9)
ERYTHROCYTE [DISTWIDTH] IN BLOOD BY AUTOMATED COUNT: 45.8 FL (ref 35.9–50)
GFR SERPLBLD CREATININE-BSD FMLA CKD-EPI: 75 ML/MIN/1.73 M 2
GLOBULIN SER CALC-MCNC: 3.3 G/DL (ref 1.9–3.5)
GLUCOSE BLD STRIP.AUTO-MCNC: 140 MG/DL (ref 65–99)
GLUCOSE SERPL-MCNC: 138 MG/DL (ref 65–99)
HCT VFR BLD AUTO: 52.1 % (ref 37–47)
HGB BLD-MCNC: 17.3 G/DL (ref 12–16)
IMM GRANULOCYTES # BLD AUTO: 0.07 K/UL (ref 0–0.11)
IMM GRANULOCYTES NFR BLD AUTO: 0.5 % (ref 0–0.9)
LYMPHOCYTES # BLD AUTO: 4.21 K/UL (ref 1–4.8)
LYMPHOCYTES NFR BLD: 32.1 % (ref 22–41)
MCH RBC QN AUTO: 32.2 PG (ref 27–33)
MCHC RBC AUTO-ENTMCNC: 33.2 G/DL (ref 32.2–35.5)
MCV RBC AUTO: 96.8 FL (ref 81.4–97.8)
MONOCYTES # BLD AUTO: 0.63 K/UL (ref 0–0.85)
MONOCYTES NFR BLD AUTO: 4.8 % (ref 0–13.4)
NEUTROPHILS # BLD AUTO: 8.07 K/UL (ref 1.82–7.42)
NEUTROPHILS NFR BLD: 61.7 % (ref 44–72)
NRBC # BLD AUTO: 0 K/UL
NRBC BLD-RTO: 0 /100 WBC (ref 0–0.2)
NT-PROBNP SERPL IA-MCNC: 72 PG/ML (ref 0–125)
PLATELET # BLD AUTO: 249 K/UL (ref 164–446)
PMV BLD AUTO: 10 FL (ref 9–12.9)
POTASSIUM SERPL-SCNC: 3.7 MMOL/L (ref 3.6–5.5)
PROT SERPL-MCNC: 7.3 G/DL (ref 6–8.2)
RBC # BLD AUTO: 5.38 M/UL (ref 4.2–5.4)
SODIUM SERPL-SCNC: 140 MMOL/L (ref 135–145)
TROPONIN T SERPL-MCNC: 8 NG/L (ref 6–19)
WBC # BLD AUTO: 13.1 K/UL (ref 4.8–10.8)

## 2025-05-26 PROCEDURE — 84484 ASSAY OF TROPONIN QUANT: CPT

## 2025-05-26 PROCEDURE — 99284 EMERGENCY DEPT VISIT MOD MDM: CPT

## 2025-05-26 PROCEDURE — 36415 COLL VENOUS BLD VENIPUNCTURE: CPT

## 2025-05-26 PROCEDURE — 82962 GLUCOSE BLOOD TEST: CPT

## 2025-05-26 PROCEDURE — 93005 ELECTROCARDIOGRAM TRACING: CPT | Mod: TC | Performed by: STUDENT IN AN ORGANIZED HEALTH CARE EDUCATION/TRAINING PROGRAM

## 2025-05-26 PROCEDURE — 80053 COMPREHEN METABOLIC PANEL: CPT

## 2025-05-26 PROCEDURE — 83880 ASSAY OF NATRIURETIC PEPTIDE: CPT

## 2025-05-26 PROCEDURE — 85025 COMPLETE CBC W/AUTO DIFF WBC: CPT

## 2025-05-26 PROCEDURE — 71045 X-RAY EXAM CHEST 1 VIEW: CPT

## 2025-05-26 PROCEDURE — 93005 ELECTROCARDIOGRAM TRACING: CPT | Mod: TC

## 2025-05-26 RX ORDER — SODIUM CHLORIDE, SODIUM LACTATE, POTASSIUM CHLORIDE, AND CALCIUM CHLORIDE .6; .31; .03; .02 G/100ML; G/100ML; G/100ML; G/100ML
1000 INJECTION, SOLUTION INTRAVENOUS ONCE
Status: DISCONTINUED | OUTPATIENT
Start: 2025-05-26 | End: 2025-05-26 | Stop reason: HOSPADM

## 2025-05-26 ASSESSMENT — FIBROSIS 4 INDEX: FIB4 SCORE: 1.07

## 2025-05-26 ASSESSMENT — PAIN DESCRIPTION - PAIN TYPE: TYPE: ACUTE PAIN

## 2025-05-26 NOTE — Clinical Note
Sharmin Cardozo was seen and treated in our emergency department on 5/26/2025.  She may return to work on 05/27/2025.       If you have any questions or concerns, please don't hesitate to call.      Sean Sawant M.D.

## 2025-05-27 NOTE — DISCHARGE INSTRUCTIONS
As we discussed ring plenty of water, rest and when you stand up after sitting or laying down to do so very slowly.  Follow-up with your primary care doctor for recheck in 3 to 5 days if your symptoms persist.

## 2025-05-27 NOTE — ED NOTES
Pt given DC instructions and verbalized understanding. Pt is A&O and ambulatory. Taken to lobby via WC and left ER with spouse

## 2025-05-27 NOTE — ED NOTES
Bedside report received from off going RN/tech: Nancy  , assumed care of patient.  POC discussed with patient. Call light within reach, all needs addressed at this time.       Fall risk interventions in place: Patient's personal possessions are with in their safe reach, Keep floor surfaces clean and dry, and Accompanied to restroom (all applicable per [place] Fall risk assessment)   Continuous monitoring: Cardiac Leads, Pulse Ox, or Blood Pressure  IVF/IV medications: Not Applicable   Oxygen: Room Air  Bedside sitter: Not Applicable   Isolation: Not Applicable

## 2025-05-27 NOTE — ED TRIAGE NOTES
"Chief Complaint   Patient presents with    Syncope     Pt reports she woke up \"not feeling herself\" this morning but still went into work. Pt had two episodes at work where she was dizzy and had syncopal event. +LOC. -head strike.        Pt wheeled  to triage for above complaint.  Charge RN made aware of pt and pt roomed to blue 21  Pt is AO x 4, follows commands, and responds appropriately to questions. Patient's breathing is unlabored and pain is currently 4/10 on the 0-10 pain scale.  Pt placed in lobby. Patient educated on triage process and encouraged to alert staff for any changes.      BP (!) 155/101   Pulse (!) 107   Temp 36.4 °C (97.5 °F) (Temporal)   Resp (!) 22   Ht 1.575 m (5' 2\")   Wt 119 kg (262 lb)   SpO2 96%     "

## 2025-05-27 NOTE — ED PROVIDER NOTES
"ED Provider Note    CHIEF COMPLAINT  Chief Complaint   Patient presents with    Syncope     Pt reports she woke up \"not feeling herself\" this morning but still went into work. Pt had two episodes at work where she was dizzy and had syncopal event. +LOC. -head strike.        EXTERNAL RECORDS REVIEWED  Outpatient Notes patient was seen in the ER with chest pain shortness of breath nausea and vomiting in November of last year at which time she had lab work performed, EKG, chest x-ray that was unremarkable.  D-dimer was negative at that time.  A few months prior to that she had a CT angiogram of her chest demonstrating no PE or aortic aneurysm or dissection and also had a recent nuclear medicine stress test that was unremarkable    HPI/ROS  LIMITATION TO HISTORY   Select: : None      Sharmin Cardozo is a 59 y.o. female who presents to the emergency department for evaluation of lightheadedness and presyncopal episodes.  The patient states that she was feeling like her normal self today until she went to work.  She states that she began to feel lightheaded associated with tunnel vision to the point where she had to stop walking, leaned against a countertop and then she believes that she may have lost consciousness versus gotten very close.  She states that this happened 2 times.  She denies striking her head or falling to the ground.  She denies any chest pain or pressure, palpitations or shortness of breath associated with this but states she was having some centralized chest pain last night.  She states that the episodes today were associated with some total body heaviness and paresthesias as well as feeling very hot.  She states she currently feels quite a bit better though still has some about heaviness in her arms and legs.    PAST MEDICAL HISTORY   has a past medical history of ASTHMA, Hypertension, and Physiological ovarian cysts.    SURGICAL HISTORY   has a past surgical history that includes vag deliv " only,prev c-sectn; tubal ligation; cystoscopy (3/24/2009); cholecystectomy; vaginal hysterectomy scope total (3/24/2009); compl.ulnar nerve transposition w/ poss autograft (); mass excision general (Left, ); cholecystectomy; and total knee arthroplasty (Left, 10/20/2022).    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 (Registered Nurse) on 25 at 1710  Med List Status: Partial     Medication Last Dose Status   albuterol 108 (90 Base) MCG/ACT Aero Soln inhalation aerosol  Active   aspirin 81 MG EC tablet  Active   benzonatate (TESSALON) 100 MG Cap  Active   guaiFENesin dextromethorphan (ROBITUSSIN DM) 100-10 MG/5ML Syrup syrup  Active   losartan (COZAAR) 50 MG Tab  Active   metFORMIN (GLUCOPHAGE) 500 MG Tab  Active   metoprolol SR (TOPROL XL) 50 MG TABLET SR 24 HR  Active   mometasone-formoterol (DULERA) 200-5 MCG/ACT Aerosol  Active   Multiple Vitamin (DAILY VITAMIN PO)  Active   nicotine (NICODERM) 21 MG/24HR PATCH 24 HR  Active   omeprazole (PRILOSEC) 20 MG delayed-release capsule  Active   Phenyleph-Triprolidine-DM-APAP (MUCINEX NIGHT SEV COLD/FLU MAX) 10-2.5- MG/20ML Solution  Active   Phenylephrine-DM-GG-APAP (MUCINEX FAST-MAX COLD FLU) 5--325 MG/10ML Liquid  Active   tiotropium (SPIRIVA RESPIMAT) 2.5 mcg/Act Aero Soln  Active                    ALLERGIES  Allergies[1]    PHYSICAL  "EXAM  VITAL SIGNS: BP (!) 159/81   Pulse 83   Temp 36.4 °C (97.5 °F) (Temporal)   Resp 17   Ht 1.575 m (5' 2\")   Wt 119 kg (262 lb)   LMP 04/17/2008   SpO2 97%   BMI 47.92 kg/m²    Constitutional: No acute distress  HEENT: Atraumatic, normocephalic, pupils are equal round reactive to light, nose normal, mouth shows moist mucous membranes  Neck: Supple, no JVD, no tracheal deviation  Cardiovascular: Regular rate and rhythm, no murmur, rub or gallop, 2+ pulses peripherally x4  Thorax & Lungs: No respiratory distress, no wheezes, rales or rhonchi, no chest wall tenderness.  GI: Soft, non-distended, non-tender, no rebound  Skin: Warm, dry, no acute rash or lesion  Musculoskeletal: Moving all extremities, no acute deformity, no edema, no tenderness  Neurologic: A&Ox3, at baseline mentation, cranial nerves II through XII are grossly intact, no sensory deficit, no ataxia  Psychiatric: Appropriate affect for situation at this time        EKG/LABS  Labs Reviewed   CBC WITH DIFFERENTIAL - Abnormal; Notable for the following components:       Result Value    WBC 13.1 (*)     Hemoglobin 17.3 (*)     Hematocrit 52.1 (*)     Neutrophils (Absolute) 8.07 (*)     All other components within normal limits   COMP METABOLIC PANEL - Abnormal; Notable for the following components:    Glucose 138 (*)     Alkaline Phosphatase 108 (*)     All other components within normal limits   POCT GLUCOSE DEVICE RESULTS - Abnormal; Notable for the following components:    POC Glucose, Blood 140 (*)     All other components within normal limits   PROBRAIN NATRIURETIC PEPTIDE, NT   TROPONIN   ESTIMATED GFR     Results for orders placed or performed during the hospital encounter of 05/26/25   EKG   Result Value Ref Range    Report       [facility] Emergency Dept.    Test Date:  2025-05-26  Pt Name:    ERENDIRA MARRERO                  Department: ER  MRN:        [id number]                      Room:  Gender:     Female                 "       Technician: [id number]  :        [id number]                   Requested By:ER TRIAGE PROTOCOL  Order #:    [id number]                    Reading MD: Sean Sawant    Measurements  Intervals                                Axis  Rate:       102                          P:          73  HI:         151                          QRS:        259  QRSD:       86                           T:          39  QT:         363  QTc:        473    Interpretive Statements  Sinus tachycardia, left anterior fascicular block, QT prolongation.  Otherwise no STEMI or acute ischemic change from prior EKG  Electronically Signed On 2025 19:19:26 PDT by Sean Sawant       *Note: Due to a large number of results and/or encounters for the requested time period, some results have not been displayed. A complete set of results can be found in Results Review.       I have independently interpreted this EKG    RADIOLOGY/PROCEDURES   I have independently interpreted the diagnostic imaging associated with this visit and am waiting the final reading from the radiologist.   My preliminary interpretation is as follows: Chest x-ray demonstrates no pulmonary edema, pneumonia    Radiologist interpretation:  DX-CHEST-PORTABLE (1 VIEW)   Final Result         1. No acute cardiopulmonary abnormalities are identified.          COURSE & MEDICAL DECISION MAKING    ASSESSMENT, COURSE AND PLAN  Care Narrative:     Patient presents to the emergency department for evaluation of 2 what sounds like presyncopal episodes at work associated with significant prodromal of lightheadedness, tunnel vision type symptoms, feeling hot.  On arrival she was mildly tachycardic but her vital signs normalized without intervention.  She endorses some nonspecific diffuse weakness but otherwise is relatively asymptomatic at the time of my assessment as well.  EKG was obtained remarkable for QT prolongation but no evidence of acute ischemia nor arrhythmia or change from  prior.  Labs were obtained demonstrating findings suggestive of dehydration including hemoconcentration of hemoglobin and hematocrit.  No evidence of anemia, electrolyte abnormality, acidosis, kidney injury or hepatic dysfunction.  Screening cardiac evaluation undertaken given vague report of atypical chest pain last night though none currently.  Troponin negative and BNP not elevated.  Clinically no evidence of new onset heart failure.  Doubt cardiogenic syncope.  No clinical features suggestive of PE given no pleuritic chest pain, shortness of breath, hypoxia, evidence of DVT. Patient was provided with oral fluids after which she was feeling significantly better though still endorsed some symptoms of lightheadedness when standing up quickly.  I suspect orthostasis likely in the setting of dehydration.  I recommended continued good oral hydration, rest, slow position changes, wearing compression stockings at work.  I do not feel admission for any further workup necessary at this point.  I recommended she follow-up with her primary care doctor for reassessment.  Return precautions discussed and all questions answered she was discharged in stable condition.      ADDITIONAL PROBLEMS MANAGED  None    DISPOSITION AND DISCUSSIONS  I have discussed management of the patient with the following physicians and DEEDEE's: None    Discussion of management with other Q or appropriate source(s): None     Escalation of care considered, and ultimately not performed:IV fluids    FINAL DIAGNOSIS  1. Dehydration    2. Near syncope         Electronically signed by: Sean Sawant M.D., 5/26/2025 5:31 PM           [1]   Allergies  Allergen Reactions    Tetanus Toxoid Rash and Swelling     Rash & swelling at injection site from tetanus vaccine     Bactrim [Sulfamethoxazole W-Trimethoprim] Vomiting and Nausea    Iodine Vomiting and Nausea    Sulfa Drugs Vomiting and Nausea